# Patient Record
Sex: MALE | Race: WHITE | Employment: FULL TIME | URBAN - METROPOLITAN AREA
[De-identification: names, ages, dates, MRNs, and addresses within clinical notes are randomized per-mention and may not be internally consistent; named-entity substitution may affect disease eponyms.]

---

## 2017-03-10 ENCOUNTER — HOSPITAL ENCOUNTER (OUTPATIENT)
Age: 52
Setting detail: SPECIMEN
Discharge: HOME | End: 2017-03-10
Payer: COMMERCIAL

## 2017-03-10 LAB
ALBUMIN SERPL-MCNC: 4.5 G/DL (ref 3.5–5.2)
ALBUMIN/GLOBULIN RATIO: 2 (ref 1–2.5)
ALP BLD-CCNC: 33 U/L (ref 40–129)
ALT SERPL-CCNC: 15 U/L (ref 5–41)
ANION GAP SERPL CALCULATED.3IONS-SCNC: 17 MMOL/L (ref 9–17)
AST SERPL-CCNC: 18 U/L
BILIRUB SERPL-MCNC: 0.23 MG/DL (ref 0.3–1.2)
BILIRUBIN DIRECT: 0.09 MG/DL
BILIRUBIN, INDIRECT: 0.14 MG/DL (ref 0–1)
BUN BLDV-MCNC: 16 MG/DL (ref 6–20)
CALCIUM SERPL-MCNC: 9.7 MG/DL (ref 8.6–10.4)
CHLORIDE BLD-SCNC: 103 MMOL/L (ref 98–107)
CHOLESTEROL/HDL RATIO: 5.8
CHOLESTEROL: 210 MG/DL
CO2: 22 MMOL/L (ref 20–31)
CREAT SERPL-MCNC: 0.61 MG/DL (ref 0.7–1.2)
GFR AFRICAN AMERICAN: >60 ML/MIN
GFR NON-AFRICAN AMERICAN: >60 ML/MIN
GFR SERPL CREATININE-BSD FRML MDRD: ABNORMAL ML/MIN/{1.73_M2}
GFR SERPL CREATININE-BSD FRML MDRD: ABNORMAL ML/MIN/{1.73_M2}
GLOBULIN: ABNORMAL G/DL (ref 1.5–3.8)
GLUCOSE BLD-MCNC: 99 MG/DL (ref 70–99)
HCT VFR BLD CALC: 42 % (ref 41–53)
HDLC SERPL-MCNC: 36 MG/DL
HEMOGLOBIN: 14.1 G/DL (ref 13.5–17.5)
IRON: 73 UG/DL (ref 59–158)
LDL CHOLESTEROL: 140 MG/DL (ref 0–130)
MAGNESIUM: 1.9 MG/DL (ref 1.6–2.6)
MCH RBC QN AUTO: 29.2 PG (ref 26–34)
MCHC RBC AUTO-ENTMCNC: 33.6 G/DL (ref 31–37)
MCV RBC AUTO: 86.9 FL (ref 80–100)
PDW BLD-RTO: 13.2 % (ref 12.5–15.4)
PLATELET # BLD: 258 K/UL (ref 140–450)
PMV BLD AUTO: 7.8 FL (ref 6–12)
POTASSIUM SERPL-SCNC: 4.3 MMOL/L (ref 3.7–5.3)
RBC # BLD: 4.83 M/UL (ref 4.5–5.9)
SODIUM BLD-SCNC: 142 MMOL/L (ref 135–144)
TOTAL PROTEIN: 6.8 G/DL (ref 6.4–8.3)
TRIGL SERPL-MCNC: 169 MG/DL
URIC ACID: 4.7 MG/DL (ref 3.4–7)
VITAMIN B-12: 305 PG/ML (ref 211–946)
VLDLC SERPL CALC-MCNC: ABNORMAL MG/DL (ref 1–30)
WBC # BLD: 5.9 K/UL (ref 3.5–11)

## 2017-03-12 LAB
ESTIMATED AVERAGE GLUCOSE: 108 MG/DL
HBA1C MFR BLD: 5.4 % (ref 4–6)

## 2017-06-02 ENCOUNTER — HOSPITAL ENCOUNTER (OUTPATIENT)
Age: 52
Setting detail: SPECIMEN
Discharge: HOME | End: 2017-06-02
Payer: COMMERCIAL

## 2017-06-02 LAB
ALBUMIN SERPL-MCNC: 4.5 G/DL (ref 3.5–5.2)
ALBUMIN/GLOBULIN RATIO: 1.8 (ref 1–2.5)
ALP BLD-CCNC: 43 U/L (ref 40–129)
ALT SERPL-CCNC: 23 U/L (ref 5–41)
ANION GAP SERPL CALCULATED.3IONS-SCNC: 16 MMOL/L (ref 9–17)
AST SERPL-CCNC: 19 U/L
BILIRUB SERPL-MCNC: 0.55 MG/DL (ref 0.3–1.2)
BILIRUBIN DIRECT: 0.13 MG/DL
BILIRUBIN, INDIRECT: 0.42 MG/DL (ref 0–1)
BUN BLDV-MCNC: 13 MG/DL (ref 6–20)
CALCIUM SERPL-MCNC: 10 MG/DL (ref 8.6–10.4)
CHLORIDE BLD-SCNC: 101 MMOL/L (ref 98–107)
CHOLESTEROL/HDL RATIO: 5.2
CHOLESTEROL: 202 MG/DL
CO2: 24 MMOL/L (ref 20–31)
CREAT SERPL-MCNC: 0.5 MG/DL (ref 0.7–1.2)
ESTIMATED AVERAGE GLUCOSE: 103 MG/DL
FOLATE: >20 NG/ML
GFR AFRICAN AMERICAN: >60 ML/MIN
GFR NON-AFRICAN AMERICAN: >60 ML/MIN
GFR SERPL CREATININE-BSD FRML MDRD: ABNORMAL ML/MIN/{1.73_M2}
GFR SERPL CREATININE-BSD FRML MDRD: ABNORMAL ML/MIN/{1.73_M2}
GLOBULIN: NORMAL G/DL (ref 1.5–3.8)
GLUCOSE BLD-MCNC: 104 MG/DL (ref 70–99)
HBA1C MFR BLD: 5.2 % (ref 4–6)
HCT VFR BLD CALC: 46.3 % (ref 41–53)
HDLC SERPL-MCNC: 39 MG/DL
HEMOGLOBIN: 15.6 G/DL (ref 13.5–17.5)
IRON: 121 UG/DL (ref 59–158)
LDL CHOLESTEROL: 116 MG/DL (ref 0–130)
MCH RBC QN AUTO: 29.2 PG (ref 26–34)
MCHC RBC AUTO-ENTMCNC: 33.6 G/DL (ref 31–37)
MCV RBC AUTO: 86.9 FL (ref 80–100)
PDW BLD-RTO: 14 % (ref 12.5–15.4)
PLATELET # BLD: 233 K/UL (ref 140–450)
PMV BLD AUTO: 8.3 FL (ref 6–12)
POTASSIUM SERPL-SCNC: 4.8 MMOL/L (ref 3.7–5.3)
PROSTATE SPECIFIC ANTIGEN: 0.87 UG/L
RBC # BLD: 5.32 M/UL (ref 4.5–5.9)
SODIUM BLD-SCNC: 141 MMOL/L (ref 135–144)
TOTAL PROTEIN: 7 G/DL (ref 6.4–8.3)
TRIGL SERPL-MCNC: 236 MG/DL
URIC ACID: 5.2 MG/DL (ref 3.4–7)
VITAMIN B-12: 381 PG/ML (ref 211–946)
VLDLC SERPL CALC-MCNC: ABNORMAL MG/DL (ref 1–30)
WBC # BLD: 7 K/UL (ref 3.5–11)

## 2017-06-05 LAB — VITAMIN B1 WHOLE BLOOD: 171 NMOL/L (ref 70–180)

## 2017-06-07 ENCOUNTER — HOSPITAL ENCOUNTER (OUTPATIENT)
Age: 52
Setting detail: OUTPATIENT SURGERY
Discharge: HOME | End: 2017-06-07
Attending: UROLOGY | Admitting: UROLOGY
Payer: COMMERCIAL

## 2017-06-07 VITALS
BODY MASS INDEX: 35.7 KG/M2 | DIASTOLIC BLOOD PRESSURE: 86 MMHG | TEMPERATURE: 98.1 F | SYSTOLIC BLOOD PRESSURE: 148 MMHG | HEIGHT: 69 IN | WEIGHT: 241 LBS | RESPIRATION RATE: 14 BRPM | HEART RATE: 64 BPM | OXYGEN SATURATION: 98 %

## 2017-06-07 PROBLEM — L72.3 SCROTAL SEBACEOUS CYST: Status: ACTIVE | Noted: 2017-06-07

## 2017-06-07 PROCEDURE — 7100000011 HC PHASE II RECOVERY - ADDTL 15 MIN: Performed by: UROLOGY

## 2017-06-07 PROCEDURE — 2500000003 HC RX 250 WO HCPCS: Performed by: UROLOGY

## 2017-06-07 PROCEDURE — 3600000012 HC SURGERY LEVEL 2 ADDTL 15MIN: Performed by: UROLOGY

## 2017-06-07 PROCEDURE — 7100000010 HC PHASE II RECOVERY - FIRST 15 MIN: Performed by: UROLOGY

## 2017-06-07 PROCEDURE — 88305 TISSUE EXAM BY PATHOLOGIST: CPT

## 2017-06-07 PROCEDURE — 6370000000 HC RX 637 (ALT 250 FOR IP)

## 2017-06-07 PROCEDURE — 3600000002 HC SURGERY LEVEL 2 BASE: Performed by: UROLOGY

## 2017-06-07 RX ORDER — SODIUM CHLORIDE, SODIUM LACTATE, POTASSIUM CHLORIDE, CALCIUM CHLORIDE 600; 310; 30; 20 MG/100ML; MG/100ML; MG/100ML; MG/100ML
INJECTION, SOLUTION INTRAVENOUS CONTINUOUS
Status: DISCONTINUED | OUTPATIENT
Start: 2017-06-08 | End: 2017-06-07

## 2017-06-07 RX ORDER — SODIUM CHLORIDE 9 MG/ML
INJECTION, SOLUTION INTRAVENOUS CONTINUOUS
Status: DISCONTINUED | OUTPATIENT
Start: 2017-06-08 | End: 2017-06-07

## 2017-06-07 RX ORDER — DIAPER,BRIEF,INFANT-TODD,DISP
EACH MISCELLANEOUS PRN
Status: DISCONTINUED | OUTPATIENT
Start: 2017-06-07 | End: 2017-06-07 | Stop reason: HOSPADM

## 2017-06-07 RX ORDER — SODIUM CHLORIDE 0.9 % (FLUSH) 0.9 %
10 SYRINGE (ML) INJECTION PRN
Status: DISCONTINUED | OUTPATIENT
Start: 2017-06-07 | End: 2017-06-07

## 2017-06-07 RX ORDER — SODIUM CHLORIDE 0.9 % (FLUSH) 0.9 %
10 SYRINGE (ML) INJECTION EVERY 12 HOURS SCHEDULED
Status: DISCONTINUED | OUTPATIENT
Start: 2017-06-07 | End: 2017-06-07

## 2017-06-07 RX ORDER — BUPIVACAINE HYDROCHLORIDE 5 MG/ML
INJECTION, SOLUTION EPIDURAL; INTRACAUDAL PRN
Status: DISCONTINUED | OUTPATIENT
Start: 2017-06-07 | End: 2017-06-07 | Stop reason: HOSPADM

## 2017-06-07 RX ORDER — LIDOCAINE HYDROCHLORIDE 10 MG/ML
1 INJECTION, SOLUTION EPIDURAL; INFILTRATION; INTRACAUDAL; PERINEURAL
Status: DISCONTINUED | OUTPATIENT
Start: 2017-06-07 | End: 2017-06-07

## 2017-06-07 ASSESSMENT — PAIN - FUNCTIONAL ASSESSMENT: PAIN_FUNCTIONAL_ASSESSMENT: 0-10

## 2017-06-08 LAB — SURGICAL PATHOLOGY REPORT: NORMAL

## 2017-08-25 ENCOUNTER — HOSPITAL ENCOUNTER (OUTPATIENT)
Age: 52
Setting detail: SPECIMEN
Discharge: HOME | End: 2017-08-25
Payer: COMMERCIAL

## 2017-08-25 LAB
ALBUMIN SERPL-MCNC: 4.5 G/DL (ref 3.5–5.2)
ALBUMIN/GLOBULIN RATIO: 1.8 (ref 1–2.5)
ALP BLD-CCNC: 46 U/L (ref 40–129)
ALT SERPL-CCNC: 41 U/L (ref 5–41)
ANION GAP SERPL CALCULATED.3IONS-SCNC: 14 MMOL/L (ref 9–17)
AST SERPL-CCNC: 43 U/L
BILIRUB SERPL-MCNC: 0.49 MG/DL (ref 0.3–1.2)
BILIRUBIN DIRECT: 0.14 MG/DL
BILIRUBIN, INDIRECT: 0.35 MG/DL (ref 0–1)
BUN BLDV-MCNC: 12 MG/DL (ref 6–20)
CHLORIDE BLD-SCNC: 105 MMOL/L (ref 98–107)
CHOLESTEROL/HDL RATIO: 5
CHOLESTEROL: 214 MG/DL
CO2: 23 MMOL/L (ref 20–31)
CREAT SERPL-MCNC: 0.42 MG/DL (ref 0.7–1.2)
ESTIMATED AVERAGE GLUCOSE: 97 MG/DL
FOLATE: >20 NG/ML
GFR AFRICAN AMERICAN: >60 ML/MIN
GFR NON-AFRICAN AMERICAN: >60 ML/MIN
GFR SERPL CREATININE-BSD FRML MDRD: ABNORMAL ML/MIN/{1.73_M2}
GFR SERPL CREATININE-BSD FRML MDRD: ABNORMAL ML/MIN/{1.73_M2}
GLOBULIN: ABNORMAL G/DL (ref 1.5–3.8)
GLUCOSE BLD-MCNC: 106 MG/DL (ref 70–99)
HBA1C MFR BLD: 5 % (ref 4–6)
HCT VFR BLD CALC: 42 % (ref 41–53)
HDLC SERPL-MCNC: 43 MG/DL
HEMOGLOBIN: 14.4 G/DL (ref 13.5–17.5)
LDL CHOLESTEROL: 96 MG/DL (ref 0–130)
MCH RBC QN AUTO: 31.1 PG (ref 26–34)
MCHC RBC AUTO-ENTMCNC: 34.2 G/DL (ref 31–37)
MCV RBC AUTO: 90.9 FL (ref 80–100)
PDW BLD-RTO: 15.9 % (ref 12.5–15.4)
PLATELET # BLD: 223 K/UL (ref 140–450)
PMV BLD AUTO: 7.8 FL (ref 6–12)
POTASSIUM SERPL-SCNC: 4.3 MMOL/L (ref 3.7–5.3)
RBC # BLD: 4.62 M/UL (ref 4.5–5.9)
SODIUM BLD-SCNC: 142 MMOL/L (ref 135–144)
TOTAL PROTEIN: 7 G/DL (ref 6.4–8.3)
TRIGL SERPL-MCNC: 374 MG/DL
URIC ACID: 4.7 MG/DL (ref 3.4–7)
VLDLC SERPL CALC-MCNC: ABNORMAL MG/DL (ref 1–30)
WBC # BLD: 6.9 K/UL (ref 3.5–11)

## 2017-08-29 LAB — VITAMIN B1 WHOLE BLOOD: 163 NMOL/L (ref 70–180)

## 2017-11-17 ENCOUNTER — HOSPITAL ENCOUNTER (OUTPATIENT)
Age: 52
Discharge: HOME | End: 2017-11-17
Payer: COMMERCIAL

## 2017-11-17 ENCOUNTER — HOSPITAL ENCOUNTER (OUTPATIENT)
Dept: GENERAL RADIOLOGY | Age: 52
Discharge: HOME | End: 2017-11-17
Payer: COMMERCIAL

## 2017-11-17 DIAGNOSIS — M25.561 RIGHT KNEE PAIN, UNSPECIFIED CHRONICITY: ICD-10-CM

## 2017-11-17 PROCEDURE — 73562 X-RAY EXAM OF KNEE 3: CPT

## 2017-12-01 ENCOUNTER — HOSPITAL ENCOUNTER (OUTPATIENT)
Age: 52
Setting detail: SPECIMEN
Discharge: HOME | End: 2017-12-01
Payer: COMMERCIAL

## 2017-12-01 LAB
ALBUMIN SERPL-MCNC: 4 G/DL (ref 3.5–5.2)
ALBUMIN/GLOBULIN RATIO: 1.5 (ref 1–2.5)
ALP BLD-CCNC: 54 U/L (ref 40–129)
ALT SERPL-CCNC: 61 U/L (ref 5–41)
ANION GAP SERPL CALCULATED.3IONS-SCNC: 15 MMOL/L (ref 9–17)
AST SERPL-CCNC: 83 U/L
BILIRUB SERPL-MCNC: 0.53 MG/DL (ref 0.3–1.2)
BILIRUBIN DIRECT: 0.18 MG/DL
BILIRUBIN, INDIRECT: 0.35 MG/DL (ref 0–1)
BUN BLDV-MCNC: 11 MG/DL (ref 6–20)
CHLORIDE BLD-SCNC: 102 MMOL/L (ref 98–107)
CHOLESTEROL/HDL RATIO: 6.8
CHOLESTEROL: 252 MG/DL
CO2: 22 MMOL/L (ref 20–31)
CREAT SERPL-MCNC: 0.38 MG/DL (ref 0.7–1.2)
ESTIMATED AVERAGE GLUCOSE: 100 MG/DL
GFR AFRICAN AMERICAN: >60 ML/MIN
GFR NON-AFRICAN AMERICAN: >60 ML/MIN
GFR SERPL CREATININE-BSD FRML MDRD: ABNORMAL ML/MIN/{1.73_M2}
GFR SERPL CREATININE-BSD FRML MDRD: ABNORMAL ML/MIN/{1.73_M2}
GLOBULIN: ABNORMAL G/DL (ref 1.5–3.8)
GLUCOSE BLD-MCNC: 112 MG/DL (ref 70–99)
HBA1C MFR BLD: 5.1 % (ref 4–6)
HCT VFR BLD CALC: 41.6 % (ref 40.7–50.3)
HDLC SERPL-MCNC: 37 MG/DL
HEMOGLOBIN: 14.1 G/DL (ref 13–17)
LDL CHOLESTEROL DIRECT: 109 MG/DL
LDL CHOLESTEROL: ABNORMAL MG/DL (ref 0–130)
MCH RBC QN AUTO: 31.4 PG (ref 25.2–33.5)
MCHC RBC AUTO-ENTMCNC: 33.9 G/DL (ref 28.4–34.8)
MCV RBC AUTO: 92.7 FL (ref 82.6–102.9)
PDW BLD-RTO: 12.8 % (ref 11.8–14.4)
PLATELET # BLD: 191 K/UL (ref 138–453)
PMV BLD AUTO: 10.1 FL (ref 8.1–13.5)
POTASSIUM SERPL-SCNC: 4 MMOL/L (ref 3.7–5.3)
RBC # BLD: 4.49 M/UL (ref 4.21–5.77)
SODIUM BLD-SCNC: 139 MMOL/L (ref 135–144)
TOTAL PROTEIN: 6.6 G/DL (ref 6.4–8.3)
TRIGL SERPL-MCNC: 759 MG/DL
URIC ACID: 5.2 MG/DL (ref 3.4–7)
VLDLC SERPL CALC-MCNC: ABNORMAL MG/DL (ref 1–30)
WBC # BLD: 5.6 K/UL (ref 3.5–11.3)

## 2017-12-05 LAB — VITAMIN B1 WHOLE BLOOD: 162 NMOL/L (ref 70–180)

## 2018-02-09 ENCOUNTER — HOSPITAL ENCOUNTER (OUTPATIENT)
Age: 53
Discharge: HOME | End: 2018-02-09
Payer: COMMERCIAL

## 2018-02-09 LAB
ALBUMIN SERPL-MCNC: 4.7 G/DL (ref 3.5–5.2)
ALBUMIN/GLOBULIN RATIO: 2.6 (ref 1–2.5)
ALP BLD-CCNC: 30 U/L (ref 40–129)
ALT SERPL-CCNC: 23 U/L (ref 5–41)
ANION GAP SERPL CALCULATED.3IONS-SCNC: 13 MMOL/L (ref 9–17)
AST SERPL-CCNC: 23 U/L
BILIRUB SERPL-MCNC: 0.41 MG/DL (ref 0.3–1.2)
BILIRUBIN DIRECT: 0.13 MG/DL
BILIRUBIN, INDIRECT: 0.28 MG/DL (ref 0–1)
BUN BLDV-MCNC: 17 MG/DL (ref 6–20)
CHLORIDE BLD-SCNC: 102 MMOL/L (ref 98–107)
CHOLESTEROL, FASTING: 209 MG/DL
CHOLESTEROL/HDL RATIO: 5.8
CO2: 26 MMOL/L (ref 20–31)
CREAT SERPL-MCNC: 0.59 MG/DL (ref 0.7–1.2)
ESTIMATED AVERAGE GLUCOSE: 94 MG/DL
FOLATE: >20 NG/ML
GFR AFRICAN AMERICAN: >60 ML/MIN
GFR NON-AFRICAN AMERICAN: >60 ML/MIN
GFR SERPL CREATININE-BSD FRML MDRD: ABNORMAL ML/MIN/{1.73_M2}
GFR SERPL CREATININE-BSD FRML MDRD: ABNORMAL ML/MIN/{1.73_M2}
GLOBULIN: ABNORMAL G/DL (ref 1.5–3.8)
GLUCOSE BLD-MCNC: 110 MG/DL (ref 70–99)
HBA1C MFR BLD: 4.9 % (ref 4–6)
HCT VFR BLD CALC: 44.9 % (ref 40.7–50.3)
HDLC SERPL-MCNC: 36 MG/DL
HEMOGLOBIN: 15.1 G/DL (ref 13–17)
LDL CHOLESTEROL: 125 MG/DL (ref 0–130)
MCH RBC QN AUTO: 31.1 PG (ref 25.2–33.5)
MCHC RBC AUTO-ENTMCNC: 33.6 G/DL (ref 28.4–34.8)
MCV RBC AUTO: 92.4 FL (ref 82.6–102.9)
NRBC AUTOMATED: 0 PER 100 WBC
PDW BLD-RTO: 11.6 % (ref 11.8–14.4)
PLATELET # BLD: 245 K/UL (ref 138–453)
PMV BLD AUTO: 9.7 FL (ref 8.1–13.5)
POTASSIUM SERPL-SCNC: 4.5 MMOL/L (ref 3.7–5.3)
RBC # BLD: 4.86 M/UL (ref 4.21–5.77)
SODIUM BLD-SCNC: 141 MMOL/L (ref 135–144)
TOTAL PROTEIN: 6.5 G/DL (ref 6.4–8.3)
TRIGLYCERIDE, FASTING: 238 MG/DL
URIC ACID: 5 MG/DL (ref 3.4–7)
VLDLC SERPL CALC-MCNC: ABNORMAL MG/DL (ref 1–30)
WBC # BLD: 6.8 K/UL (ref 3.5–11.3)

## 2018-02-09 PROCEDURE — 80061 LIPID PANEL: CPT

## 2018-02-09 PROCEDURE — 83036 HEMOGLOBIN GLYCOSYLATED A1C: CPT

## 2018-02-09 PROCEDURE — 82746 ASSAY OF FOLIC ACID SERUM: CPT

## 2018-02-09 PROCEDURE — 82947 ASSAY GLUCOSE BLOOD QUANT: CPT

## 2018-02-09 PROCEDURE — 82565 ASSAY OF CREATININE: CPT

## 2018-02-09 PROCEDURE — 36415 COLL VENOUS BLD VENIPUNCTURE: CPT

## 2018-02-09 PROCEDURE — 84425 ASSAY OF VITAMIN B-1: CPT

## 2018-02-09 PROCEDURE — 80076 HEPATIC FUNCTION PANEL: CPT

## 2018-02-09 PROCEDURE — 84520 ASSAY OF UREA NITROGEN: CPT

## 2018-02-09 PROCEDURE — 80051 ELECTROLYTE PANEL: CPT

## 2018-02-09 PROCEDURE — 84550 ASSAY OF BLOOD/URIC ACID: CPT

## 2018-02-09 PROCEDURE — 85027 COMPLETE CBC AUTOMATED: CPT

## 2018-02-14 LAB — VITAMIN B1 WHOLE BLOOD: 159 NMOL/L (ref 70–180)

## 2018-04-23 ENCOUNTER — HOSPITAL ENCOUNTER (OUTPATIENT)
Dept: CT IMAGING | Age: 53
Discharge: HOME | End: 2018-04-25
Payer: COMMERCIAL

## 2018-04-23 DIAGNOSIS — M79.672 LEFT FOOT PAIN: ICD-10-CM

## 2018-04-23 PROCEDURE — 73700 CT LOWER EXTREMITY W/O DYE: CPT

## 2018-05-17 ENCOUNTER — HOSPITAL ENCOUNTER (OUTPATIENT)
Age: 53
Setting detail: SPECIMEN
Discharge: HOME | End: 2018-05-17
Payer: COMMERCIAL

## 2018-05-17 LAB
ALBUMIN SERPL-MCNC: 4.2 G/DL (ref 3.5–5.2)
ALBUMIN/GLOBULIN RATIO: 1.7 (ref 1–2.5)
ALP BLD-CCNC: 30 U/L (ref 40–129)
ALT SERPL-CCNC: 21 U/L (ref 5–41)
ANION GAP SERPL CALCULATED.3IONS-SCNC: 13 MMOL/L (ref 9–17)
AST SERPL-CCNC: 20 U/L
BILIRUB SERPL-MCNC: 0.32 MG/DL (ref 0.3–1.2)
BILIRUBIN DIRECT: 0.11 MG/DL
BILIRUBIN, INDIRECT: 0.21 MG/DL (ref 0–1)
BUN BLDV-MCNC: 17 MG/DL (ref 6–20)
CHLORIDE BLD-SCNC: 102 MMOL/L (ref 98–107)
CHOLESTEROL/HDL RATIO: 5
CHOLESTEROL: 176 MG/DL
CO2: 25 MMOL/L (ref 20–31)
CREAT SERPL-MCNC: 0.63 MG/DL (ref 0.7–1.2)
ESTIMATED AVERAGE GLUCOSE: 105 MG/DL
FOLATE: >20 NG/ML
GFR AFRICAN AMERICAN: >60 ML/MIN
GFR NON-AFRICAN AMERICAN: >60 ML/MIN
GFR SERPL CREATININE-BSD FRML MDRD: ABNORMAL ML/MIN/{1.73_M2}
GFR SERPL CREATININE-BSD FRML MDRD: ABNORMAL ML/MIN/{1.73_M2}
GLOBULIN: ABNORMAL G/DL (ref 1.5–3.8)
GLUCOSE BLD-MCNC: 103 MG/DL (ref 70–99)
HBA1C MFR BLD: 5.3 % (ref 4–6)
HCT VFR BLD CALC: 44.2 % (ref 40.7–50.3)
HDLC SERPL-MCNC: 35 MG/DL
HEMOGLOBIN: 14.7 G/DL (ref 13–17)
IRON: 70 UG/DL (ref 59–158)
LDL CHOLESTEROL: 73 MG/DL (ref 0–130)
MCH RBC QN AUTO: 29.3 PG (ref 25.2–33.5)
MCHC RBC AUTO-ENTMCNC: 33.3 G/DL (ref 28.4–34.8)
MCV RBC AUTO: 88.2 FL (ref 82.6–102.9)
NRBC AUTOMATED: 0 PER 100 WBC
PDW BLD-RTO: 12.6 % (ref 11.8–14.4)
PLATELET # BLD: 271 K/UL (ref 138–453)
PMV BLD AUTO: 9.3 FL (ref 8.1–13.5)
POTASSIUM SERPL-SCNC: 4.1 MMOL/L (ref 3.7–5.3)
RBC # BLD: 5.01 M/UL (ref 4.21–5.77)
SODIUM BLD-SCNC: 140 MMOL/L (ref 135–144)
TOTAL PROTEIN: 6.7 G/DL (ref 6.4–8.3)
TRIGL SERPL-MCNC: 341 MG/DL
URIC ACID: 5.7 MG/DL (ref 3.4–7)
VITAMIN B-12: 361 PG/ML (ref 232–1245)
VLDLC SERPL CALC-MCNC: ABNORMAL MG/DL (ref 1–30)
WBC # BLD: 7.2 K/UL (ref 3.5–11.3)

## 2018-05-21 LAB — VITAMIN B1 WHOLE BLOOD: 134 NMOL/L (ref 70–180)

## 2018-08-24 ENCOUNTER — HOSPITAL ENCOUNTER (OUTPATIENT)
Age: 53
Setting detail: SPECIMEN
Discharge: HOME | End: 2018-08-24
Payer: COMMERCIAL

## 2018-08-24 LAB
ALBUMIN SERPL-MCNC: 4.5 G/DL (ref 3.5–5.2)
ALBUMIN/GLOBULIN RATIO: 1.7 (ref 1–2.5)
ALP BLD-CCNC: 36 U/L (ref 40–129)
ALT SERPL-CCNC: 25 U/L (ref 5–41)
ANION GAP SERPL CALCULATED.3IONS-SCNC: 16 MMOL/L (ref 9–17)
AST SERPL-CCNC: 33 U/L
BILIRUB SERPL-MCNC: 0.41 MG/DL (ref 0.3–1.2)
BILIRUBIN DIRECT: 0.14 MG/DL
BILIRUBIN, INDIRECT: 0.27 MG/DL (ref 0–1)
BUN BLDV-MCNC: 15 MG/DL (ref 6–20)
CHLORIDE BLD-SCNC: 99 MMOL/L (ref 98–107)
CHOLESTEROL/HDL RATIO: 4.7
CHOLESTEROL: 215 MG/DL
CO2: 22 MMOL/L (ref 20–31)
CREAT SERPL-MCNC: 0.51 MG/DL (ref 0.7–1.2)
ESTIMATED AVERAGE GLUCOSE: 105 MG/DL
GFR AFRICAN AMERICAN: >60 ML/MIN
GFR NON-AFRICAN AMERICAN: >60 ML/MIN
GFR SERPL CREATININE-BSD FRML MDRD: ABNORMAL ML/MIN/{1.73_M2}
GFR SERPL CREATININE-BSD FRML MDRD: ABNORMAL ML/MIN/{1.73_M2}
GLOBULIN: ABNORMAL G/DL (ref 1.5–3.8)
GLUCOSE BLD-MCNC: 110 MG/DL (ref 70–99)
HBA1C MFR BLD: 5.3 % (ref 4–6)
HCT VFR BLD CALC: 44.5 % (ref 40.7–50.3)
HDLC SERPL-MCNC: 46 MG/DL
HEMOGLOBIN: 15.1 G/DL (ref 13–17)
IRON: 72 UG/DL (ref 59–158)
LDL CHOLESTEROL: 107 MG/DL (ref 0–130)
MCH RBC QN AUTO: 31.4 PG (ref 25.2–33.5)
MCHC RBC AUTO-ENTMCNC: 33.9 G/DL (ref 28.4–34.8)
MCV RBC AUTO: 92.5 FL (ref 82.6–102.9)
NRBC AUTOMATED: 0 PER 100 WBC
PDW BLD-RTO: 13.6 % (ref 11.8–14.4)
PLATELET # BLD: 235 K/UL (ref 138–453)
PMV BLD AUTO: 9.6 FL (ref 8.1–13.5)
POTASSIUM SERPL-SCNC: 4.2 MMOL/L (ref 3.7–5.3)
PROSTATE SPECIFIC ANTIGEN: 0.54 UG/L
PROSTATE SPECIFIC ANTIGEN: 0.54 UG/L
RBC # BLD: 4.81 M/UL (ref 4.21–5.77)
SODIUM BLD-SCNC: 137 MMOL/L (ref 135–144)
TOTAL PROTEIN: 7.1 G/DL (ref 6.4–8.3)
TRIGL SERPL-MCNC: 311 MG/DL
URIC ACID: 5 MG/DL (ref 3.4–7)
VITAMIN B-12: 412 PG/ML (ref 232–1245)
VLDLC SERPL CALC-MCNC: ABNORMAL MG/DL (ref 1–30)
WBC # BLD: 7.4 K/UL (ref 3.5–11.3)

## 2018-08-29 ENCOUNTER — HOSPITAL ENCOUNTER (OUTPATIENT)
Dept: GENERAL RADIOLOGY | Age: 53
Discharge: HOME | End: 2018-08-31
Payer: COMMERCIAL

## 2018-08-29 ENCOUNTER — HOSPITAL ENCOUNTER (OUTPATIENT)
Age: 53
Discharge: HOME | End: 2018-08-31
Payer: COMMERCIAL

## 2018-08-29 DIAGNOSIS — M25.561 ACUTE PAIN OF RIGHT KNEE: ICD-10-CM

## 2018-08-29 DIAGNOSIS — M79.671 RIGHT FOOT PAIN: ICD-10-CM

## 2018-08-29 LAB — VITAMIN B1 WHOLE BLOOD: 187 NMOL/L (ref 70–180)

## 2018-08-29 PROCEDURE — 73562 X-RAY EXAM OF KNEE 3: CPT

## 2018-08-29 PROCEDURE — 73630 X-RAY EXAM OF FOOT: CPT

## 2018-11-02 ENCOUNTER — HOSPITAL ENCOUNTER (OUTPATIENT)
Age: 53
Setting detail: SPECIMEN
Discharge: HOME | End: 2018-11-02
Payer: COMMERCIAL

## 2018-11-02 LAB
ALBUMIN SERPL-MCNC: 4.2 G/DL (ref 3.5–5.2)
ALBUMIN/GLOBULIN RATIO: 1.8 (ref 1–2.5)
ALP BLD-CCNC: 32 U/L (ref 40–129)
ALT SERPL-CCNC: 23 U/L (ref 5–41)
ANION GAP SERPL CALCULATED.3IONS-SCNC: 18 MMOL/L (ref 9–17)
AST SERPL-CCNC: 26 U/L
BILIRUB SERPL-MCNC: 0.43 MG/DL (ref 0.3–1.2)
BILIRUBIN DIRECT: 0.15 MG/DL
BILIRUBIN, INDIRECT: 0.28 MG/DL (ref 0–1)
BUN BLDV-MCNC: 13 MG/DL (ref 6–20)
CHLORIDE BLD-SCNC: 106 MMOL/L (ref 98–107)
CHOLESTEROL/HDL RATIO: 4.3
CHOLESTEROL: 194 MG/DL
CO2: 22 MMOL/L (ref 20–31)
CREAT SERPL-MCNC: 0.51 MG/DL (ref 0.7–1.2)
ESTIMATED AVERAGE GLUCOSE: 94 MG/DL
GFR AFRICAN AMERICAN: >60 ML/MIN
GFR NON-AFRICAN AMERICAN: >60 ML/MIN
GFR SERPL CREATININE-BSD FRML MDRD: ABNORMAL ML/MIN/{1.73_M2}
GFR SERPL CREATININE-BSD FRML MDRD: ABNORMAL ML/MIN/{1.73_M2}
GLOBULIN: ABNORMAL G/DL (ref 1.5–3.8)
GLUCOSE BLD-MCNC: 102 MG/DL (ref 70–99)
HBA1C MFR BLD: 4.9 % (ref 4–6)
HCT VFR BLD CALC: 40.1 % (ref 40.7–50.3)
HDLC SERPL-MCNC: 45 MG/DL
HEMOGLOBIN: 13.6 G/DL (ref 13–17)
IRON: 98 UG/DL (ref 59–158)
LDL CHOLESTEROL: 99 MG/DL (ref 0–130)
MCH RBC QN AUTO: 32.8 PG (ref 25.2–33.5)
MCHC RBC AUTO-ENTMCNC: 33.9 G/DL (ref 28.4–34.8)
MCV RBC AUTO: 96.6 FL (ref 82.6–102.9)
NRBC AUTOMATED: 0 PER 100 WBC
PDW BLD-RTO: 13 % (ref 11.8–14.4)
PLATELET # BLD: 229 K/UL (ref 138–453)
PMV BLD AUTO: 9.7 FL (ref 8.1–13.5)
POTASSIUM SERPL-SCNC: 4 MMOL/L (ref 3.7–5.3)
RBC # BLD: 4.15 M/UL (ref 4.21–5.77)
SODIUM BLD-SCNC: 146 MMOL/L (ref 135–144)
TOTAL PROTEIN: 6.6 G/DL (ref 6.4–8.3)
TRIGL SERPL-MCNC: 252 MG/DL
URIC ACID: 5.8 MG/DL (ref 3.4–7)
VITAMIN B-12: 270 PG/ML (ref 232–1245)
VLDLC SERPL CALC-MCNC: ABNORMAL MG/DL (ref 1–30)
WBC # BLD: 6.7 K/UL (ref 3.5–11.3)

## 2018-11-07 LAB — VITAMIN B1 WHOLE BLOOD: 136 NMOL/L (ref 70–180)

## 2018-11-13 ENCOUNTER — HOSPITAL ENCOUNTER (EMERGENCY)
Age: 53
Discharge: HOME | End: 2018-11-13
Attending: EMERGENCY MEDICINE
Payer: COMMERCIAL

## 2018-11-13 VITALS
OXYGEN SATURATION: 98 % | SYSTOLIC BLOOD PRESSURE: 166 MMHG | HEART RATE: 88 BPM | TEMPERATURE: 98.5 F | RESPIRATION RATE: 16 BRPM | DIASTOLIC BLOOD PRESSURE: 7 MMHG

## 2018-11-13 DIAGNOSIS — R26.81 UNSTEADY GAIT: Primary | ICD-10-CM

## 2018-11-13 LAB
ABSOLUTE EOS #: 0.05 K/UL (ref 0–0.44)
ABSOLUTE IMMATURE GRANULOCYTE: 0.05 K/UL (ref 0–0.3)
ABSOLUTE LYMPH #: 1.88 K/UL (ref 1.1–3.7)
ABSOLUTE MONO #: 0.74 K/UL (ref 0.1–1.2)
ACETAMINOPHEN LEVEL: <5 UG/ML (ref 10–30)
ALBUMIN SERPL-MCNC: 3.5 G/DL (ref 3.5–5.2)
ALBUMIN/GLOBULIN RATIO: 1.6 (ref 1–2.5)
ALP BLD-CCNC: 41 U/L (ref 40–129)
ALT SERPL-CCNC: 316 U/L (ref 5–41)
ANION GAP SERPL CALCULATED.3IONS-SCNC: 20 MMOL/L (ref 9–17)
AST SERPL-CCNC: 837 U/L
BASOPHILS # BLD: 1 % (ref 0–2)
BASOPHILS ABSOLUTE: 0.04 K/UL (ref 0–0.2)
BILIRUB SERPL-MCNC: 0.98 MG/DL (ref 0.3–1.2)
BUN BLDV-MCNC: 23 MG/DL (ref 6–20)
BUN/CREAT BLD: ABNORMAL (ref 9–20)
CALCIUM SERPL-MCNC: 8.1 MG/DL (ref 8.6–10.4)
CHLORIDE BLD-SCNC: 105 MMOL/L (ref 98–107)
CO2: 17 MMOL/L (ref 20–31)
CREAT SERPL-MCNC: 0.48 MG/DL (ref 0.7–1.2)
DIFFERENTIAL TYPE: ABNORMAL
EOSINOPHILS RELATIVE PERCENT: 1 % (ref 1–4)
ETHANOL PERCENT: 0.14 %
ETHANOL: 143 MG/DL
GFR AFRICAN AMERICAN: >60 ML/MIN
GFR NON-AFRICAN AMERICAN: >60 ML/MIN
GFR SERPL CREATININE-BSD FRML MDRD: ABNORMAL ML/MIN/{1.73_M2}
GFR SERPL CREATININE-BSD FRML MDRD: ABNORMAL ML/MIN/{1.73_M2}
GLUCOSE BLD-MCNC: 116 MG/DL (ref 70–99)
HCT VFR BLD CALC: 42.5 % (ref 40.7–50.3)
HEMOGLOBIN: 14.7 G/DL (ref 13–17)
IMMATURE GRANULOCYTES: 1 %
LYMPHOCYTES # BLD: 22 % (ref 24–43)
MCH RBC QN AUTO: 32.2 PG (ref 25.2–33.5)
MCHC RBC AUTO-ENTMCNC: 34.6 G/DL (ref 28.4–34.8)
MCV RBC AUTO: 93.2 FL (ref 82.6–102.9)
MONOCYTES # BLD: 9 % (ref 3–12)
NRBC AUTOMATED: 0 PER 100 WBC
PDW BLD-RTO: 12.9 % (ref 11.8–14.4)
PLATELET # BLD: 224 K/UL (ref 138–453)
PLATELET ESTIMATE: ABNORMAL
PMV BLD AUTO: 9.1 FL (ref 8.1–13.5)
POTASSIUM SERPL-SCNC: 3.9 MMOL/L (ref 3.7–5.3)
RBC # BLD: 4.56 M/UL (ref 4.21–5.77)
RBC # BLD: ABNORMAL 10*6/UL
SALICYLATE LEVEL: <1 MG/DL (ref 3–10)
SEG NEUTROPHILS: 66 % (ref 36–65)
SEGMENTED NEUTROPHILS ABSOLUTE COUNT: 5.99 K/UL (ref 1.5–8.1)
SODIUM BLD-SCNC: 142 MMOL/L (ref 135–144)
TOTAL PROTEIN: 5.7 G/DL (ref 6.4–8.3)
TOXIC TRICYCLIC SC,BLOOD: NEGATIVE
TSH SERPL DL<=0.05 MIU/L-ACNC: 0.79 MIU/L (ref 0.3–5)
WBC # BLD: 8.8 K/UL (ref 3.5–11.3)
WBC # BLD: ABNORMAL 10*3/UL

## 2018-11-13 PROCEDURE — 84443 ASSAY THYROID STIM HORMONE: CPT

## 2018-11-13 PROCEDURE — 85025 COMPLETE CBC W/AUTO DIFF WBC: CPT

## 2018-11-13 PROCEDURE — 80053 COMPREHEN METABOLIC PANEL: CPT

## 2018-11-13 PROCEDURE — 80307 DRUG TEST PRSMV CHEM ANLYZR: CPT

## 2018-11-13 PROCEDURE — G0480 DRUG TEST DEF 1-7 CLASSES: HCPCS

## 2018-11-13 PROCEDURE — G0383 LEV 4 HOSP TYPE B ED VISIT: HCPCS

## 2018-11-13 ASSESSMENT — ENCOUNTER SYMPTOMS
ALLERGIC/IMMUNOLOGIC NEGATIVE: 1
GASTROINTESTINAL NEGATIVE: 1
RESPIRATORY NEGATIVE: 1
EYES NEGATIVE: 1

## 2018-11-13 NOTE — ED PROVIDER NOTES
[facility]     Emergency Department     Faculty Note/ Attestation      Pt Name: Andre Harris                                       MRN: [id number]  Eveliogffortunato 1965  Date of evaluation: 11/13/2018    Patients PCP:    Rufina Perry PA-C      Attestation  I performed a history and physical examination of the patient and discussed management with the resident. I reviewed the residents note and agree with the documented findings and plan of care. Any areas of disagreement are noted on the chart. I was personally present for the key portions of any procedures. I have documented in the chart those procedures where I was not present during the key portions. I have reviewed the emergency nurses triage note. I agree with the chief complaint, past medical history, past surgical history, allergies, medications, social and family history as documented unless otherwise noted below. For Physician Assistant/ Nurse Practitioner cases/documentation I have personally evaluated this patient and have completed at least one if not all key elements of the E/M (history, physical exam, and MDM). Additional findings are as noted. Initial Screens:             Vitals:    Vitals:    11/13/18 0743   BP: (!) 166/7   Pulse: 88   Resp: 16   Temp: 98.5 °F (36.9 °C)   SpO2: 98%       CHIEF COMPLAINT       Chief Complaint   Patient presents with    Dizziness     for 2 days, feels \"wobbley\"              DIAGNOSTIC RESULTS             RADIOLOGY:   No orders to display         LABS:  Labs Reviewed - No data to display      EMERGENCY DEPARTMENT COURSE:     -------------------------  BP: (!) 166/7, Temp: 98.5 °F (36.9 °C), Pulse: 88, Resp: 16      Comments            Jenkins MD, F.A.C.E.P.   Attending Emergency Physician         Elva Rojas MD  11/13/18 0840

## 2018-12-01 ENCOUNTER — HOSPITAL ENCOUNTER (EMERGENCY)
Age: 53
Discharge: HOME | End: 2018-12-01
Attending: EMERGENCY MEDICINE
Payer: COMMERCIAL

## 2018-12-01 VITALS
BODY MASS INDEX: 37.03 KG/M2 | HEART RATE: 85 BPM | DIASTOLIC BLOOD PRESSURE: 113 MMHG | RESPIRATION RATE: 16 BRPM | WEIGHT: 250 LBS | TEMPERATURE: 98.1 F | OXYGEN SATURATION: 100 % | HEIGHT: 69 IN | SYSTOLIC BLOOD PRESSURE: 189 MMHG

## 2018-12-01 DIAGNOSIS — R74.8 ELEVATED LIVER ENZYMES: ICD-10-CM

## 2018-12-01 DIAGNOSIS — F10.20 ALCOHOLISM (HCC): ICD-10-CM

## 2018-12-01 DIAGNOSIS — R53.83 FATIGUE, UNSPECIFIED TYPE: Primary | ICD-10-CM

## 2018-12-01 LAB
-: ABNORMAL
ABSOLUTE EOS #: 0 K/UL (ref 0–0.4)
ABSOLUTE IMMATURE GRANULOCYTE: ABNORMAL K/UL (ref 0–0.3)
ABSOLUTE LYMPH #: 1.1 K/UL (ref 1–4.8)
ABSOLUTE MONO #: 0.4 K/UL (ref 0.1–1.2)
ACETAMINOPHEN LEVEL: <5 UG/ML (ref 10–30)
ALBUMIN SERPL-MCNC: 3.9 G/DL (ref 3.5–5.2)
ALBUMIN/GLOBULIN RATIO: 1.6 (ref 1–2.5)
ALP BLD-CCNC: 56 U/L (ref 40–129)
ALT SERPL-CCNC: 462 U/L (ref 5–41)
AMORPHOUS: ABNORMAL
AMPHETAMINE SCREEN URINE: NEGATIVE
AMYLASE: 22 U/L (ref 28–100)
ANION GAP SERPL CALCULATED.3IONS-SCNC: 17 MMOL/L (ref 9–17)
AST SERPL-CCNC: 1090 U/L
BACTERIA: ABNORMAL
BARBITURATE SCREEN URINE: NEGATIVE
BASOPHILS # BLD: 1 % (ref 0–2)
BASOPHILS ABSOLUTE: 0 K/UL (ref 0–0.2)
BENZODIAZEPINE SCREEN, URINE: NEGATIVE
BILIRUB SERPL-MCNC: 1.87 MG/DL (ref 0.3–1.2)
BILIRUBIN DIRECT: 1.15 MG/DL
BILIRUBIN URINE: ABNORMAL
BILIRUBIN, INDIRECT: 0.72 MG/DL (ref 0–1)
BUN BLDV-MCNC: 15 MG/DL (ref 6–20)
BUN/CREAT BLD: ABNORMAL (ref 9–20)
BUPRENORPHINE URINE: NORMAL
CALCIUM SERPL-MCNC: 8.6 MG/DL (ref 8.6–10.4)
CANNABINOID SCREEN URINE: NEGATIVE
CASTS UA: ABNORMAL /LPF
CHLORIDE BLD-SCNC: 102 MMOL/L (ref 98–107)
CO2: 17 MMOL/L (ref 20–31)
COCAINE METABOLITE, URINE: NEGATIVE
COLOR: ABNORMAL
COMMENT UA: ABNORMAL
CREAT SERPL-MCNC: 0.67 MG/DL (ref 0.7–1.2)
CRYSTALS, UA: ABNORMAL /HPF
DIFFERENTIAL TYPE: ABNORMAL
EOSINOPHILS RELATIVE PERCENT: 1 % (ref 1–4)
EPITHELIAL CELLS UA: ABNORMAL /HPF (ref 0–5)
ETHANOL PERCENT: <0.01 %
ETHANOL: <10 MG/DL
GFR AFRICAN AMERICAN: >60 ML/MIN
GFR NON-AFRICAN AMERICAN: >60 ML/MIN
GFR SERPL CREATININE-BSD FRML MDRD: ABNORMAL ML/MIN/{1.73_M2}
GFR SERPL CREATININE-BSD FRML MDRD: ABNORMAL ML/MIN/{1.73_M2}
GLOBULIN: ABNORMAL G/DL (ref 1.5–3.8)
GLUCOSE BLD-MCNC: 172 MG/DL (ref 70–99)
GLUCOSE URINE: NEGATIVE
HCT VFR BLD CALC: 45.3 % (ref 41–53)
HEMOGLOBIN: 15.4 G/DL (ref 13.5–17.5)
IMMATURE GRANULOCYTES: ABNORMAL %
KETONES, URINE: NEGATIVE
LEUKOCYTE ESTERASE, URINE: NEGATIVE
LIPASE: 37 U/L (ref 13–60)
LYMPHOCYTES # BLD: 16 % (ref 24–44)
MCH RBC QN AUTO: 32.7 PG (ref 26–34)
MCHC RBC AUTO-ENTMCNC: 34.1 G/DL (ref 31–37)
MCV RBC AUTO: 95.8 FL (ref 80–100)
MDMA URINE: NORMAL
METHADONE SCREEN, URINE: NEGATIVE
METHAMPHETAMINE, URINE: NORMAL
MONOCYTES # BLD: 6 % (ref 2–11)
MUCUS: ABNORMAL
NITRITE, URINE: NEGATIVE
NRBC AUTOMATED: ABNORMAL PER 100 WBC
OPIATES, URINE: NEGATIVE
OTHER OBSERVATIONS UA: ABNORMAL
OXYCODONE SCREEN URINE: NEGATIVE
PDW BLD-RTO: 13.1 % (ref 12.5–15.4)
PH UA: 5.5 (ref 5–8)
PHENCYCLIDINE, URINE: NEGATIVE
PLATELET # BLD: 237 K/UL (ref 140–450)
PLATELET ESTIMATE: ABNORMAL
PMV BLD AUTO: 7.2 FL (ref 6–12)
POTASSIUM SERPL-SCNC: 3.9 MMOL/L (ref 3.7–5.3)
PROPOXYPHENE, URINE: NORMAL
PROTEIN UA: NEGATIVE
RBC # BLD: 4.72 M/UL (ref 4.5–5.9)
RBC # BLD: ABNORMAL 10*6/UL
RBC UA: ABNORMAL /HPF (ref 0–2)
RENAL EPITHELIAL, UA: ABNORMAL /HPF
SALICYLATE LEVEL: <1 MG/DL (ref 3–10)
SEG NEUTROPHILS: 76 % (ref 36–66)
SEGMENTED NEUTROPHILS ABSOLUTE COUNT: 5.6 K/UL (ref 1.8–7.7)
SODIUM BLD-SCNC: 136 MMOL/L (ref 135–144)
SPECIFIC GRAVITY UA: 1.03 (ref 1–1.03)
TEST INFORMATION: NORMAL
TOTAL PROTEIN: 6.3 G/DL (ref 6.4–8.3)
TOXIC TRICYCLIC SC,BLOOD: NEGATIVE
TRICHOMONAS: ABNORMAL
TRICYCLIC ANTIDEPRESSANTS, UR: NORMAL
TURBIDITY: CLEAR
URINE HGB: NEGATIVE
UROBILINOGEN, URINE: NORMAL
WBC # BLD: 7.2 K/UL (ref 3.5–11)
WBC # BLD: ABNORMAL 10*3/UL
WBC UA: ABNORMAL /HPF (ref 0–5)
YEAST: ABNORMAL

## 2018-12-01 PROCEDURE — 80076 HEPATIC FUNCTION PANEL: CPT

## 2018-12-01 PROCEDURE — 83690 ASSAY OF LIPASE: CPT

## 2018-12-01 PROCEDURE — 99284 EMERGENCY DEPT VISIT MOD MDM: CPT

## 2018-12-01 PROCEDURE — G0480 DRUG TEST DEF 1-7 CLASSES: HCPCS

## 2018-12-01 PROCEDURE — 85025 COMPLETE CBC W/AUTO DIFF WBC: CPT

## 2018-12-01 PROCEDURE — 80048 BASIC METABOLIC PNL TOTAL CA: CPT

## 2018-12-01 PROCEDURE — 36415 COLL VENOUS BLD VENIPUNCTURE: CPT

## 2018-12-01 PROCEDURE — 80307 DRUG TEST PRSMV CHEM ANLYZR: CPT

## 2018-12-01 PROCEDURE — 82150 ASSAY OF AMYLASE: CPT

## 2018-12-01 PROCEDURE — 81001 URINALYSIS AUTO W/SCOPE: CPT

## 2018-12-01 RX ORDER — LORAZEPAM 1 MG/1
1 TABLET ORAL EVERY 6 HOURS PRN
COMMUNITY
End: 2019-07-27

## 2018-12-01 NOTE — ED NOTES
Patient to the ER with c/c of general malaise today when waking up. Patient is very non-specific of any type of symptoms. Patient presents to the ER with his elderly parents. Patient is a/o x 3, patent airway, speaking clearly in complete sentences. Patient denies any CP or dyspnea. Lungs are clear and equal bilaterally to auscultation, HT are S/R, A=R. Abdomen is soft, non-tender. No NVD in last 24 hours. Normal Bowel and Bladder habits. Patient has strong PMS x 4. No peripheral edema is appreciated. Patient was ambulatory into the ER today without distress. Patient has a hx of alcoholism and has not had a drink for a week. Denies any shaking or tremors.      Holly Castro RN  12/01/18 101

## 2018-12-05 ENCOUNTER — HOSPITAL ENCOUNTER (OUTPATIENT)
Dept: ULTRASOUND IMAGING | Age: 53
Discharge: HOME | End: 2018-12-07
Payer: COMMERCIAL

## 2018-12-05 ENCOUNTER — HOSPITAL ENCOUNTER (OUTPATIENT)
Age: 53
Discharge: HOME | End: 2018-12-05
Payer: COMMERCIAL

## 2018-12-05 DIAGNOSIS — R94.5 NONSPECIFIC ABNORMAL RESULTS OF LIVER FUNCTION STUDY: ICD-10-CM

## 2018-12-05 LAB
ALBUMIN SERPL-MCNC: 4.1 G/DL (ref 3.5–5.2)
ALBUMIN/GLOBULIN RATIO: 1.2 (ref 1–2.5)
ALP BLD-CCNC: 50 U/L (ref 40–129)
ALT SERPL-CCNC: 117 U/L (ref 5–41)
AST SERPL-CCNC: 64 U/L
BILIRUB SERPL-MCNC: 0.62 MG/DL (ref 0.3–1.2)
BILIRUBIN DIRECT: 0.29 MG/DL
BILIRUBIN, INDIRECT: 0.33 MG/DL (ref 0–1)
GLOBULIN: ABNORMAL G/DL (ref 1.5–3.8)
HBV SURFACE AB TITR SER: <3.5 MIU/ML
HEPATITIS C ANTIBODY: NONREACTIVE
HIV AG/AB: NONREACTIVE
TOTAL PROTEIN: 7.4 G/DL (ref 6.4–8.3)

## 2018-12-05 PROCEDURE — 86803 HEPATITIS C AB TEST: CPT

## 2018-12-05 PROCEDURE — 86317 IMMUNOASSAY INFECTIOUS AGENT: CPT

## 2018-12-05 PROCEDURE — 76705 ECHO EXAM OF ABDOMEN: CPT

## 2018-12-05 PROCEDURE — 87340 HEPATITIS B SURFACE AG IA: CPT

## 2018-12-05 PROCEDURE — 80076 HEPATIC FUNCTION PANEL: CPT

## 2018-12-05 PROCEDURE — 87389 HIV-1 AG W/HIV-1&-2 AB AG IA: CPT

## 2018-12-05 PROCEDURE — 36415 COLL VENOUS BLD VENIPUNCTURE: CPT

## 2018-12-07 LAB — HEPATITIS B SURFACE ANTIGEN: NONREACTIVE

## 2018-12-26 ENCOUNTER — HOSPITAL ENCOUNTER (OUTPATIENT)
Age: 53
Setting detail: SPECIMEN
Discharge: HOME | End: 2018-12-26
Payer: COMMERCIAL

## 2018-12-26 LAB
ALBUMIN SERPL-MCNC: 4.6 G/DL (ref 3.5–5.2)
ALBUMIN/GLOBULIN RATIO: 1.7 (ref 1–2.5)
ALP BLD-CCNC: 47 U/L (ref 40–129)
ALT SERPL-CCNC: 32 U/L (ref 5–41)
AST SERPL-CCNC: 25 U/L
BILIRUB SERPL-MCNC: 0.47 MG/DL (ref 0.3–1.2)
BILIRUBIN DIRECT: 0.13 MG/DL
BILIRUBIN, INDIRECT: 0.34 MG/DL (ref 0–1)
GLOBULIN: NORMAL G/DL (ref 1.5–3.8)
TOTAL PROTEIN: 7.3 G/DL (ref 6.4–8.3)

## 2019-02-08 ENCOUNTER — HOSPITAL ENCOUNTER (OUTPATIENT)
Age: 54
Setting detail: SPECIMEN
Discharge: HOME | End: 2019-02-08
Payer: COMMERCIAL

## 2019-02-08 LAB
ALBUMIN SERPL-MCNC: 4.5 G/DL (ref 3.5–5.2)
ALBUMIN/GLOBULIN RATIO: 1.8 (ref 1–2.5)
ALP BLD-CCNC: 46 U/L (ref 40–129)
ALT SERPL-CCNC: 22 U/L (ref 5–41)
ANION GAP SERPL CALCULATED.3IONS-SCNC: 11 MMOL/L (ref 9–17)
AST SERPL-CCNC: 18 U/L
BILIRUB SERPL-MCNC: 0.38 MG/DL (ref 0.3–1.2)
BILIRUBIN DIRECT: 0.11 MG/DL
BILIRUBIN, INDIRECT: 0.27 MG/DL (ref 0–1)
BUN BLDV-MCNC: 15 MG/DL (ref 6–20)
CHLORIDE BLD-SCNC: 103 MMOL/L (ref 98–107)
CHOLESTEROL/HDL RATIO: 5.4
CHOLESTEROL: 195 MG/DL
CO2: 26 MMOL/L (ref 20–31)
CREAT SERPL-MCNC: 0.48 MG/DL (ref 0.7–1.2)
ESTIMATED AVERAGE GLUCOSE: 100 MG/DL
GFR AFRICAN AMERICAN: >60 ML/MIN
GFR NON-AFRICAN AMERICAN: >60 ML/MIN
GFR SERPL CREATININE-BSD FRML MDRD: ABNORMAL ML/MIN/{1.73_M2}
GFR SERPL CREATININE-BSD FRML MDRD: ABNORMAL ML/MIN/{1.73_M2}
GLOBULIN: NORMAL G/DL (ref 1.5–3.8)
GLUCOSE BLD-MCNC: 102 MG/DL (ref 70–99)
HBA1C MFR BLD: 5.1 % (ref 4–6)
HCT VFR BLD CALC: 46.4 % (ref 40.7–50.3)
HDLC SERPL-MCNC: 36 MG/DL
HEMOGLOBIN: 15.5 G/DL (ref 13–17)
IRON: 77 UG/DL (ref 59–158)
LDL CHOLESTEROL: 123 MG/DL (ref 0–130)
MCH RBC QN AUTO: 30.4 PG (ref 25.2–33.5)
MCHC RBC AUTO-ENTMCNC: 33.4 G/DL (ref 28.4–34.8)
MCV RBC AUTO: 91 FL (ref 82.6–102.9)
NRBC AUTOMATED: 0 PER 100 WBC
PDW BLD-RTO: 12.1 % (ref 11.8–14.4)
PLATELET # BLD: 248 K/UL (ref 138–453)
PMV BLD AUTO: 9.7 FL (ref 8.1–13.5)
POTASSIUM SERPL-SCNC: 4.5 MMOL/L (ref 3.7–5.3)
RBC # BLD: 5.1 M/UL (ref 4.21–5.77)
SODIUM BLD-SCNC: 140 MMOL/L (ref 135–144)
TOTAL PROTEIN: 7 G/DL (ref 6.4–8.3)
TRIGL SERPL-MCNC: 180 MG/DL
URIC ACID: 6.5 MG/DL (ref 3.4–7)
VITAMIN B-12: 389 PG/ML (ref 232–1245)
VLDLC SERPL CALC-MCNC: ABNORMAL MG/DL (ref 1–30)
WBC # BLD: 6.1 K/UL (ref 3.5–11.3)

## 2019-02-13 LAB — VITAMIN B1 WHOLE BLOOD: 105 NMOL/L (ref 70–180)

## 2019-05-03 ENCOUNTER — HOSPITAL ENCOUNTER (OUTPATIENT)
Age: 54
Discharge: HOME | End: 2019-05-03
Payer: COMMERCIAL

## 2019-05-03 LAB
ALBUMIN SERPL-MCNC: 4.4 G/DL (ref 3.5–5.2)
ALBUMIN/GLOBULIN RATIO: 1.8 (ref 1–2.5)
ALP BLD-CCNC: 45 U/L (ref 40–129)
ALT SERPL-CCNC: 19 U/L (ref 5–41)
ANION GAP SERPL CALCULATED.3IONS-SCNC: 14 MMOL/L (ref 9–17)
AST SERPL-CCNC: 16 U/L
BILIRUB SERPL-MCNC: 0.36 MG/DL (ref 0.3–1.2)
BILIRUBIN DIRECT: 0.1 MG/DL
BILIRUBIN, INDIRECT: 0.26 MG/DL (ref 0–1)
BUN BLDV-MCNC: 15 MG/DL (ref 6–20)
CALCIUM SERPL-MCNC: 9.5 MG/DL (ref 8.6–10.4)
CHLORIDE BLD-SCNC: 108 MMOL/L (ref 98–107)
CHOLESTEROL, FASTING: 173 MG/DL
CHOLESTEROL/HDL RATIO: 5.1
CO2: 23 MMOL/L (ref 20–31)
CREAT SERPL-MCNC: 0.48 MG/DL (ref 0.7–1.2)
FOLATE: >20 NG/ML
GFR AFRICAN AMERICAN: >60 ML/MIN
GFR NON-AFRICAN AMERICAN: >60 ML/MIN
GFR SERPL CREATININE-BSD FRML MDRD: ABNORMAL ML/MIN/{1.73_M2}
GFR SERPL CREATININE-BSD FRML MDRD: ABNORMAL ML/MIN/{1.73_M2}
GLOBULIN: NORMAL G/DL (ref 1.5–3.8)
GLUCOSE BLD-MCNC: 102 MG/DL (ref 70–99)
HCT VFR BLD CALC: 46.5 % (ref 40.7–50.3)
HDLC SERPL-MCNC: 34 MG/DL
HEMOGLOBIN: 14.9 G/DL (ref 13–17)
IRON: 69 UG/DL (ref 59–158)
LDL CHOLESTEROL: 109 MG/DL (ref 0–130)
MCH RBC QN AUTO: 28 PG (ref 25.2–33.5)
MCHC RBC AUTO-ENTMCNC: 32 G/DL (ref 28.4–34.8)
MCV RBC AUTO: 87.4 FL (ref 82.6–102.9)
NRBC AUTOMATED: 0 PER 100 WBC
PDW BLD-RTO: 13.5 % (ref 11.8–14.4)
PLATELET # BLD: 222 K/UL (ref 138–453)
PMV BLD AUTO: 10.2 FL (ref 8.1–13.5)
POTASSIUM SERPL-SCNC: 4.9 MMOL/L (ref 3.7–5.3)
RBC # BLD: 5.32 M/UL (ref 4.21–5.77)
SODIUM BLD-SCNC: 145 MMOL/L (ref 135–144)
TOTAL PROTEIN: 6.8 G/DL (ref 6.4–8.3)
TRIGLYCERIDE, FASTING: 148 MG/DL
URIC ACID: 6.2 MG/DL (ref 3.4–7)
VITAMIN B-12: 330 PG/ML (ref 232–1245)
VLDLC SERPL CALC-MCNC: ABNORMAL MG/DL (ref 1–30)
WBC # BLD: 6.9 K/UL (ref 3.5–11.3)

## 2019-05-03 PROCEDURE — 80051 ELECTROLYTE PANEL: CPT

## 2019-05-03 PROCEDURE — 83036 HEMOGLOBIN GLYCOSYLATED A1C: CPT

## 2019-05-03 PROCEDURE — 84550 ASSAY OF BLOOD/URIC ACID: CPT

## 2019-05-03 PROCEDURE — 82746 ASSAY OF FOLIC ACID SERUM: CPT

## 2019-05-03 PROCEDURE — 83540 ASSAY OF IRON: CPT

## 2019-05-03 PROCEDURE — 82310 ASSAY OF CALCIUM: CPT

## 2019-05-03 PROCEDURE — 80061 LIPID PANEL: CPT

## 2019-05-03 PROCEDURE — 80076 HEPATIC FUNCTION PANEL: CPT

## 2019-05-03 PROCEDURE — 84425 ASSAY OF VITAMIN B-1: CPT

## 2019-05-03 PROCEDURE — 84520 ASSAY OF UREA NITROGEN: CPT

## 2019-05-03 PROCEDURE — 36415 COLL VENOUS BLD VENIPUNCTURE: CPT

## 2019-05-03 PROCEDURE — 85027 COMPLETE CBC AUTOMATED: CPT

## 2019-05-03 PROCEDURE — 82607 VITAMIN B-12: CPT

## 2019-05-03 PROCEDURE — 82947 ASSAY GLUCOSE BLOOD QUANT: CPT

## 2019-05-03 PROCEDURE — 82565 ASSAY OF CREATININE: CPT

## 2019-05-04 LAB
ESTIMATED AVERAGE GLUCOSE: 114 MG/DL
HBA1C MFR BLD: 5.6 % (ref 4–6)

## 2019-05-08 LAB — VITAMIN B1 WHOLE BLOOD: 126 NMOL/L (ref 70–180)

## 2019-07-27 ENCOUNTER — HOSPITAL ENCOUNTER (EMERGENCY)
Age: 54
Discharge: HOME | End: 2019-07-27
Attending: EMERGENCY MEDICINE
Payer: COMMERCIAL

## 2019-07-27 ENCOUNTER — APPOINTMENT (OUTPATIENT)
Dept: GENERAL RADIOLOGY | Age: 54
End: 2019-07-27
Payer: COMMERCIAL

## 2019-07-27 VITALS
TEMPERATURE: 97.6 F | DIASTOLIC BLOOD PRESSURE: 89 MMHG | RESPIRATION RATE: 16 BRPM | OXYGEN SATURATION: 98 % | HEIGHT: 69 IN | BODY MASS INDEX: 37.77 KG/M2 | HEART RATE: 63 BPM | SYSTOLIC BLOOD PRESSURE: 125 MMHG | WEIGHT: 255 LBS

## 2019-07-27 DIAGNOSIS — M70.62 TROCHANTERIC BURSITIS OF LEFT HIP: Primary | ICD-10-CM

## 2019-07-27 PROCEDURE — 6370000000 HC RX 637 (ALT 250 FOR IP): Performed by: EMERGENCY MEDICINE

## 2019-07-27 PROCEDURE — 6360000002 HC RX W HCPCS: Performed by: EMERGENCY MEDICINE

## 2019-07-27 PROCEDURE — 99283 EMERGENCY DEPT VISIT LOW MDM: CPT

## 2019-07-27 PROCEDURE — 73502 X-RAY EXAM HIP UNI 2-3 VIEWS: CPT

## 2019-07-27 PROCEDURE — 96372 THER/PROPH/DIAG INJ SC/IM: CPT

## 2019-07-27 RX ORDER — NAPROXEN 500 MG/1
500 TABLET ORAL 2 TIMES DAILY
Qty: 10 TABLET | Refills: 0 | Status: SHIPPED | OUTPATIENT
Start: 2019-07-27 | End: 2021-12-18 | Stop reason: ALTCHOICE

## 2019-07-27 RX ORDER — PREDNISONE 20 MG/1
40 TABLET ORAL ONCE
Status: COMPLETED | OUTPATIENT
Start: 2019-07-27 | End: 2019-07-27

## 2019-07-27 RX ORDER — KETOROLAC TROMETHAMINE 15 MG/ML
15 INJECTION, SOLUTION INTRAMUSCULAR; INTRAVENOUS ONCE
Status: COMPLETED | OUTPATIENT
Start: 2019-07-27 | End: 2019-07-27

## 2019-07-27 RX ORDER — PREDNISONE 50 MG/1
50 TABLET ORAL DAILY
Qty: 4 TABLET | Refills: 0 | Status: SHIPPED | OUTPATIENT
Start: 2019-07-27 | End: 2019-07-31

## 2019-07-27 RX ADMIN — PREDNISONE 40 MG: 20 TABLET ORAL at 06:48

## 2019-07-27 RX ADMIN — KETOROLAC TROMETHAMINE 15 MG: 15 INJECTION, SOLUTION INTRAMUSCULAR; INTRAVENOUS at 06:48

## 2019-07-27 RX ADMIN — KETOROLAC TROMETHAMINE 15 MG: 15 INJECTION, SOLUTION INTRAMUSCULAR; INTRAVENOUS at 06:47

## 2019-07-27 ASSESSMENT — PAIN DESCRIPTION - ORIENTATION: ORIENTATION: LEFT

## 2019-07-27 ASSESSMENT — PAIN DESCRIPTION - LOCATION
LOCATION: HIP
LOCATION: HIP

## 2019-07-27 ASSESSMENT — ENCOUNTER SYMPTOMS: COLOR CHANGE: 0

## 2019-07-27 ASSESSMENT — PAIN SCALES - GENERAL
PAINLEVEL_OUTOF10: 8
PAINLEVEL_OUTOF10: 8
PAINLEVEL_OUTOF10: 7

## 2019-07-27 ASSESSMENT — PAIN DESCRIPTION - DESCRIPTORS: DESCRIPTORS: ACHING

## 2019-07-27 ASSESSMENT — PAIN DESCRIPTION - PAIN TYPE: TYPE: ACUTE PAIN

## 2019-08-23 ENCOUNTER — HOSPITAL ENCOUNTER (OUTPATIENT)
Age: 54
Setting detail: SPECIMEN
Discharge: HOME | End: 2019-08-23
Payer: COMMERCIAL

## 2019-08-23 LAB
ALBUMIN SERPL-MCNC: 4.4 G/DL (ref 3.5–5.2)
ALBUMIN/GLOBULIN RATIO: 1.6 (ref 1–2.5)
ALP BLD-CCNC: 45 U/L (ref 40–129)
ALT SERPL-CCNC: 21 U/L (ref 5–41)
ANION GAP SERPL CALCULATED.3IONS-SCNC: 15 MMOL/L (ref 9–17)
AST SERPL-CCNC: 21 U/L
BILIRUB SERPL-MCNC: 0.46 MG/DL (ref 0.3–1.2)
BILIRUBIN DIRECT: 0.09 MG/DL
BILIRUBIN, INDIRECT: 0.37 MG/DL (ref 0–1)
BUN BLDV-MCNC: 14 MG/DL (ref 6–20)
CALCIUM SERPL-MCNC: 9.4 MG/DL (ref 8.6–10.4)
CHLORIDE BLD-SCNC: 105 MMOL/L (ref 98–107)
CHOLESTEROL/HDL RATIO: 5.7
CHOLESTEROL: 211 MG/DL
CO2: 22 MMOL/L (ref 20–31)
CREAT SERPL-MCNC: 0.51 MG/DL (ref 0.7–1.2)
ESTIMATED AVERAGE GLUCOSE: 108 MG/DL
GFR AFRICAN AMERICAN: >60 ML/MIN
GFR NON-AFRICAN AMERICAN: >60 ML/MIN
GFR SERPL CREATININE-BSD FRML MDRD: ABNORMAL ML/MIN/{1.73_M2}
GFR SERPL CREATININE-BSD FRML MDRD: ABNORMAL ML/MIN/{1.73_M2}
GLOBULIN: NORMAL G/DL (ref 1.5–3.8)
GLUCOSE BLD-MCNC: 99 MG/DL (ref 70–99)
HBA1C MFR BLD: 5.4 % (ref 4–6)
HCT VFR BLD CALC: 46.4 % (ref 40.7–50.3)
HDLC SERPL-MCNC: 37 MG/DL
HEMOGLOBIN: 14.9 G/DL (ref 13–17)
IRON: 99 UG/DL (ref 59–158)
LDL CHOLESTEROL: 128 MG/DL (ref 0–130)
MCH RBC QN AUTO: 29 PG (ref 25.2–33.5)
MCHC RBC AUTO-ENTMCNC: 32.1 G/DL (ref 28.4–34.8)
MCV RBC AUTO: 90.4 FL (ref 82.6–102.9)
NRBC AUTOMATED: 0 PER 100 WBC
PDW BLD-RTO: 13.5 % (ref 11.8–14.4)
PLATELET # BLD: 248 K/UL (ref 138–453)
PMV BLD AUTO: 9.7 FL (ref 8.1–13.5)
POTASSIUM SERPL-SCNC: 4.5 MMOL/L (ref 3.7–5.3)
PROSTATE SPECIFIC ANTIGEN: 0.66 UG/L
PROSTATE SPECIFIC ANTIGEN: 0.66 UG/L
RBC # BLD: 5.13 M/UL (ref 4.21–5.77)
SODIUM BLD-SCNC: 142 MMOL/L (ref 135–144)
TOTAL PROTEIN: 7.2 G/DL (ref 6.4–8.3)
TRIGL SERPL-MCNC: 229 MG/DL
URIC ACID: 6.3 MG/DL (ref 3.4–7)
VITAMIN B-12: 383 PG/ML (ref 232–1245)
VLDLC SERPL CALC-MCNC: ABNORMAL MG/DL (ref 1–30)
WBC # BLD: 6.6 K/UL (ref 3.5–11.3)

## 2019-08-23 PROCEDURE — 84425 ASSAY OF VITAMIN B-1: CPT

## 2019-08-23 PROCEDURE — 80061 LIPID PANEL: CPT

## 2019-08-23 PROCEDURE — 84520 ASSAY OF UREA NITROGEN: CPT

## 2019-08-23 PROCEDURE — 36415 COLL VENOUS BLD VENIPUNCTURE: CPT

## 2019-08-23 PROCEDURE — G0103 PSA SCREENING: HCPCS

## 2019-08-23 PROCEDURE — 85027 COMPLETE CBC AUTOMATED: CPT

## 2019-08-23 PROCEDURE — 83036 HEMOGLOBIN GLYCOSYLATED A1C: CPT

## 2019-08-23 PROCEDURE — 82310 ASSAY OF CALCIUM: CPT

## 2019-08-23 PROCEDURE — 80076 HEPATIC FUNCTION PANEL: CPT

## 2019-08-23 PROCEDURE — 82607 VITAMIN B-12: CPT

## 2019-08-23 PROCEDURE — 84550 ASSAY OF BLOOD/URIC ACID: CPT

## 2019-08-23 PROCEDURE — 80051 ELECTROLYTE PANEL: CPT

## 2019-08-23 PROCEDURE — 83540 ASSAY OF IRON: CPT

## 2019-08-23 PROCEDURE — 82565 ASSAY OF CREATININE: CPT

## 2019-08-23 PROCEDURE — 82947 ASSAY GLUCOSE BLOOD QUANT: CPT

## 2019-08-27 LAB — VITAMIN B1 WHOLE BLOOD: 143 NMOL/L (ref 70–180)

## 2019-09-22 ENCOUNTER — HOSPITAL ENCOUNTER (OUTPATIENT)
Dept: GENERAL RADIOLOGY | Age: 54
Discharge: HOME | End: 2019-09-24
Payer: COMMERCIAL

## 2019-09-22 ENCOUNTER — HOSPITAL ENCOUNTER (OUTPATIENT)
Age: 54
Discharge: HOME | End: 2019-09-24
Payer: COMMERCIAL

## 2019-09-22 DIAGNOSIS — M25.561 RIGHT KNEE PAIN, UNSPECIFIED CHRONICITY: ICD-10-CM

## 2019-09-22 PROCEDURE — 73562 X-RAY EXAM OF KNEE 3: CPT

## 2019-10-07 DIAGNOSIS — M25.561 ACUTE PAIN OF RIGHT KNEE: Primary | ICD-10-CM

## 2019-10-09 ENCOUNTER — OFFICE VISIT (OUTPATIENT)
Dept: ORTHOPEDIC SURGERY | Age: 54
End: 2019-10-09
Payer: COMMERCIAL

## 2019-10-09 VITALS — WEIGHT: 255.07 LBS | HEIGHT: 69 IN | BODY MASS INDEX: 37.78 KG/M2

## 2019-10-09 DIAGNOSIS — M17.11 PRIMARY OSTEOARTHRITIS OF RIGHT KNEE: Primary | ICD-10-CM

## 2019-10-09 DIAGNOSIS — M95.8 OSTEOCHONDRAL DEFECT: ICD-10-CM

## 2019-10-09 PROCEDURE — 99243 OFF/OP CNSLTJ NEW/EST LOW 30: CPT | Performed by: ORTHOPAEDIC SURGERY

## 2019-10-09 ASSESSMENT — ENCOUNTER SYMPTOMS
DIARRHEA: 0
NAUSEA: 0
COUGH: 0
CONSTIPATION: 0

## 2019-11-15 ENCOUNTER — HOSPITAL ENCOUNTER (OUTPATIENT)
Age: 54
Discharge: HOME | End: 2019-11-15
Payer: COMMERCIAL

## 2019-11-15 LAB
ALBUMIN SERPL-MCNC: 4.4 G/DL (ref 3.5–5.2)
ALBUMIN/GLOBULIN RATIO: 1.6 (ref 1–2.5)
ALP BLD-CCNC: 45 U/L (ref 40–129)
ALT SERPL-CCNC: 19 U/L (ref 5–41)
ANION GAP SERPL CALCULATED.3IONS-SCNC: 14 MMOL/L (ref 9–17)
AST SERPL-CCNC: 16 U/L
BILIRUB SERPL-MCNC: 0.45 MG/DL (ref 0.3–1.2)
BILIRUBIN DIRECT: 0.11 MG/DL
BILIRUBIN, INDIRECT: 0.34 MG/DL (ref 0–1)
BUN BLDV-MCNC: 16 MG/DL (ref 6–20)
CHLORIDE BLD-SCNC: 107 MMOL/L (ref 98–107)
CHOLESTEROL/HDL RATIO: 5.8
CHOLESTEROL: 209 MG/DL
CO2: 23 MMOL/L (ref 20–31)
CREAT SERPL-MCNC: 0.47 MG/DL (ref 0.7–1.2)
ESTIMATED AVERAGE GLUCOSE: 114 MG/DL
GFR AFRICAN AMERICAN: >60 ML/MIN
GFR NON-AFRICAN AMERICAN: >60 ML/MIN
GFR SERPL CREATININE-BSD FRML MDRD: ABNORMAL ML/MIN/{1.73_M2}
GFR SERPL CREATININE-BSD FRML MDRD: ABNORMAL ML/MIN/{1.73_M2}
GLOBULIN: NORMAL G/DL (ref 1.5–3.8)
GLUCOSE BLD-MCNC: 99 MG/DL (ref 70–99)
HBA1C MFR BLD: 5.6 % (ref 4–6)
HCT VFR BLD CALC: 45.5 % (ref 40.7–50.3)
HDLC SERPL-MCNC: 36 MG/DL
HEMOGLOBIN: 15.2 G/DL (ref 13–17)
IRON: 93 UG/DL (ref 59–158)
LDL CHOLESTEROL: 130 MG/DL (ref 0–130)
MCH RBC QN AUTO: 29.8 PG (ref 25.2–33.5)
MCHC RBC AUTO-ENTMCNC: 33.4 G/DL (ref 28.4–34.8)
MCV RBC AUTO: 89.2 FL (ref 82.6–102.9)
NRBC AUTOMATED: 0 PER 100 WBC
PDW BLD-RTO: 13.2 % (ref 11.8–14.4)
PLATELET # BLD: 238 K/UL (ref 138–453)
PMV BLD AUTO: 9.8 FL (ref 8.1–13.5)
POTASSIUM SERPL-SCNC: 4.8 MMOL/L (ref 3.7–5.3)
RBC # BLD: 5.1 M/UL (ref 4.21–5.77)
SODIUM BLD-SCNC: 144 MMOL/L (ref 135–144)
TOTAL PROTEIN: 7.2 G/DL (ref 6.4–8.3)
TRIGL SERPL-MCNC: 213 MG/DL
URIC ACID: 6 MG/DL (ref 3.4–7)
VITAMIN B-12: 480 PG/ML (ref 232–1245)
VLDLC SERPL CALC-MCNC: ABNORMAL MG/DL (ref 1–30)
WBC # BLD: 7.2 K/UL (ref 3.5–11.3)

## 2019-11-15 PROCEDURE — 85027 COMPLETE CBC AUTOMATED: CPT

## 2019-11-15 PROCEDURE — 36415 COLL VENOUS BLD VENIPUNCTURE: CPT

## 2019-11-15 PROCEDURE — 82947 ASSAY GLUCOSE BLOOD QUANT: CPT

## 2019-11-15 PROCEDURE — 82607 VITAMIN B-12: CPT

## 2019-11-15 PROCEDURE — 84425 ASSAY OF VITAMIN B-1: CPT

## 2019-11-15 PROCEDURE — 83540 ASSAY OF IRON: CPT

## 2019-11-15 PROCEDURE — 84520 ASSAY OF UREA NITROGEN: CPT

## 2019-11-15 PROCEDURE — 80061 LIPID PANEL: CPT

## 2019-11-15 PROCEDURE — 82565 ASSAY OF CREATININE: CPT

## 2019-11-15 PROCEDURE — 84550 ASSAY OF BLOOD/URIC ACID: CPT

## 2019-11-15 PROCEDURE — 83036 HEMOGLOBIN GLYCOSYLATED A1C: CPT

## 2019-11-15 PROCEDURE — 80051 ELECTROLYTE PANEL: CPT

## 2019-11-15 PROCEDURE — 80076 HEPATIC FUNCTION PANEL: CPT

## 2019-11-18 LAB — VITAMIN B1 WHOLE BLOOD: 160 NMOL/L (ref 70–180)

## 2020-02-21 ENCOUNTER — HOSPITAL ENCOUNTER (OUTPATIENT)
Age: 55
Setting detail: SPECIMEN
Discharge: HOME | End: 2020-02-21
Payer: COMMERCIAL

## 2020-02-21 LAB
ALBUMIN SERPL-MCNC: 4.2 G/DL (ref 3.5–5.2)
ALBUMIN/GLOBULIN RATIO: 1.6 (ref 1–2.5)
ALP BLD-CCNC: 47 U/L (ref 40–129)
ALT SERPL-CCNC: 22 U/L (ref 5–41)
ANION GAP SERPL CALCULATED.3IONS-SCNC: 13 MMOL/L (ref 9–17)
AST SERPL-CCNC: 18 U/L
BILIRUB SERPL-MCNC: 0.37 MG/DL (ref 0.3–1.2)
BILIRUBIN DIRECT: 0.1 MG/DL
BILIRUBIN, INDIRECT: 0.27 MG/DL (ref 0–1)
BUN BLDV-MCNC: 15 MG/DL (ref 6–20)
CHLORIDE BLD-SCNC: 108 MMOL/L (ref 98–107)
CHOLESTEROL/HDL RATIO: 5.7
CHOLESTEROL: 199 MG/DL
CO2: 21 MMOL/L (ref 20–31)
CREAT SERPL-MCNC: 0.47 MG/DL (ref 0.7–1.2)
ESTIMATED AVERAGE GLUCOSE: 111 MG/DL
GFR AFRICAN AMERICAN: >60 ML/MIN
GFR NON-AFRICAN AMERICAN: >60 ML/MIN
GFR SERPL CREATININE-BSD FRML MDRD: ABNORMAL ML/MIN/{1.73_M2}
GFR SERPL CREATININE-BSD FRML MDRD: ABNORMAL ML/MIN/{1.73_M2}
GLOBULIN: NORMAL G/DL (ref 1.5–3.8)
GLUCOSE BLD-MCNC: 94 MG/DL (ref 70–99)
HBA1C MFR BLD: 5.5 % (ref 4–6)
HCT VFR BLD CALC: 45.9 % (ref 40.7–50.3)
HDLC SERPL-MCNC: 35 MG/DL
HEMOGLOBIN: 15.2 G/DL (ref 13–17)
IRON: 85 UG/DL (ref 59–158)
LDL CHOLESTEROL: 119 MG/DL (ref 0–130)
MCH RBC QN AUTO: 29.9 PG (ref 25.2–33.5)
MCHC RBC AUTO-ENTMCNC: 33.1 G/DL (ref 28.4–34.8)
MCV RBC AUTO: 90.2 FL (ref 82.6–102.9)
NRBC AUTOMATED: 0 PER 100 WBC
PDW BLD-RTO: 13.2 % (ref 11.8–14.4)
PLATELET # BLD: 251 K/UL (ref 138–453)
PMV BLD AUTO: 9.5 FL (ref 8.1–13.5)
POTASSIUM SERPL-SCNC: 4.6 MMOL/L (ref 3.7–5.3)
RBC # BLD: 5.09 M/UL (ref 4.21–5.77)
SODIUM BLD-SCNC: 142 MMOL/L (ref 135–144)
TOTAL PROTEIN: 6.8 G/DL (ref 6.4–8.3)
TRIGL SERPL-MCNC: 227 MG/DL
URIC ACID: 6.4 MG/DL (ref 3.4–7)
VITAMIN B-12: 438 PG/ML (ref 232–1245)
VLDLC SERPL CALC-MCNC: ABNORMAL MG/DL (ref 1–30)
WBC # BLD: 7.6 K/UL (ref 3.5–11.3)

## 2020-02-26 LAB — VITAMIN B1 WHOLE BLOOD: 156 NMOL/L (ref 70–180)

## 2020-03-25 PROBLEM — E78.5 HYPERLIPIDEMIA: Status: RESOLVED | Noted: 2020-03-25 | Resolved: 2020-03-24

## 2020-05-15 ENCOUNTER — HOSPITAL ENCOUNTER (OUTPATIENT)
Age: 55
Discharge: HOME | End: 2020-05-15
Payer: COMMERCIAL

## 2020-05-15 LAB
ALBUMIN SERPL-MCNC: 4.2 G/DL (ref 3.5–5.2)
ALBUMIN/GLOBULIN RATIO: 1.6 (ref 1–2.5)
ALP BLD-CCNC: 49 U/L (ref 40–129)
ALT SERPL-CCNC: 26 U/L (ref 5–41)
ANION GAP SERPL CALCULATED.3IONS-SCNC: 15 MMOL/L (ref 9–17)
AST SERPL-CCNC: 20 U/L
BILIRUB SERPL-MCNC: 0.39 MG/DL (ref 0.3–1.2)
BILIRUBIN DIRECT: 0.11 MG/DL
BILIRUBIN, INDIRECT: 0.28 MG/DL (ref 0–1)
BUN BLDV-MCNC: 12 MG/DL (ref 6–20)
CHLORIDE BLD-SCNC: 105 MMOL/L (ref 98–107)
CHOLESTEROL, FASTING: 207 MG/DL
CHOLESTEROL/HDL RATIO: 5.9
CO2: 21 MMOL/L (ref 20–31)
CREAT SERPL-MCNC: 0.48 MG/DL (ref 0.7–1.2)
GFR AFRICAN AMERICAN: >60 ML/MIN
GFR NON-AFRICAN AMERICAN: >60 ML/MIN
GFR SERPL CREATININE-BSD FRML MDRD: ABNORMAL ML/MIN/{1.73_M2}
GFR SERPL CREATININE-BSD FRML MDRD: ABNORMAL ML/MIN/{1.73_M2}
GLOBULIN: NORMAL G/DL (ref 1.5–3.8)
GLUCOSE FASTING: 101 MG/DL (ref 70–99)
HCT VFR BLD CALC: 46.6 % (ref 40.7–50.3)
HDLC SERPL-MCNC: 35 MG/DL
HEMOGLOBIN: 15.8 G/DL (ref 13–17)
IRON: 77 UG/DL (ref 59–158)
LDL CHOLESTEROL: 135 MG/DL (ref 0–130)
MCH RBC QN AUTO: 30.3 PG (ref 25.2–33.5)
MCHC RBC AUTO-ENTMCNC: 33.9 G/DL (ref 28.4–34.8)
MCV RBC AUTO: 89.3 FL (ref 82.6–102.9)
NRBC AUTOMATED: 0 PER 100 WBC
PDW BLD-RTO: 12.9 % (ref 11.8–14.4)
PLATELET # BLD: 268 K/UL (ref 138–453)
PMV BLD AUTO: 9.7 FL (ref 8.1–13.5)
POTASSIUM SERPL-SCNC: 4.9 MMOL/L (ref 3.7–5.3)
RBC # BLD: 5.22 M/UL (ref 4.21–5.77)
SODIUM BLD-SCNC: 141 MMOL/L (ref 135–144)
TOTAL PROTEIN: 6.9 G/DL (ref 6.4–8.3)
TRIGLYCERIDE, FASTING: 183 MG/DL
URIC ACID: 5.7 MG/DL (ref 3.4–7)
VITAMIN B-12: 595 PG/ML (ref 232–1245)
VLDLC SERPL CALC-MCNC: ABNORMAL MG/DL (ref 1–30)
WBC # BLD: 8.2 K/UL (ref 3.5–11.3)

## 2020-05-15 PROCEDURE — 82607 VITAMIN B-12: CPT

## 2020-05-15 PROCEDURE — 82947 ASSAY GLUCOSE BLOOD QUANT: CPT

## 2020-05-15 PROCEDURE — 84520 ASSAY OF UREA NITROGEN: CPT

## 2020-05-15 PROCEDURE — 83036 HEMOGLOBIN GLYCOSYLATED A1C: CPT

## 2020-05-15 PROCEDURE — 80061 LIPID PANEL: CPT

## 2020-05-15 PROCEDURE — 80051 ELECTROLYTE PANEL: CPT

## 2020-05-15 PROCEDURE — 82565 ASSAY OF CREATININE: CPT

## 2020-05-15 PROCEDURE — 83540 ASSAY OF IRON: CPT

## 2020-05-15 PROCEDURE — 36415 COLL VENOUS BLD VENIPUNCTURE: CPT

## 2020-05-15 PROCEDURE — 85027 COMPLETE CBC AUTOMATED: CPT

## 2020-05-15 PROCEDURE — 80076 HEPATIC FUNCTION PANEL: CPT

## 2020-05-15 PROCEDURE — 84425 ASSAY OF VITAMIN B-1: CPT

## 2020-05-15 PROCEDURE — 84550 ASSAY OF BLOOD/URIC ACID: CPT

## 2020-05-17 LAB
ESTIMATED AVERAGE GLUCOSE: 105 MG/DL
HBA1C MFR BLD: 5.3 % (ref 4–6)

## 2020-05-18 LAB — VITAMIN B1 WHOLE BLOOD: 164 NMOL/L (ref 70–180)

## 2020-09-04 ENCOUNTER — HOSPITAL ENCOUNTER (OUTPATIENT)
Age: 55
Discharge: HOME | End: 2020-09-04
Payer: COMMERCIAL

## 2020-09-04 LAB
ABSOLUTE EOS #: 0.13 K/UL (ref 0–0.44)
ABSOLUTE IMMATURE GRANULOCYTE: 0.03 K/UL (ref 0–0.3)
ABSOLUTE LYMPH #: 2.48 K/UL (ref 1.1–3.7)
ABSOLUTE MONO #: 0.39 K/UL (ref 0.1–1.2)
ALBUMIN SERPL-MCNC: 4.3 G/DL (ref 3.5–5.2)
ALBUMIN/GLOBULIN RATIO: 1.8 (ref 1–2.5)
ALP BLD-CCNC: 48 U/L (ref 40–129)
ALT SERPL-CCNC: 19 U/L (ref 5–41)
ANION GAP SERPL CALCULATED.3IONS-SCNC: 11 MMOL/L (ref 9–17)
AST SERPL-CCNC: 15 U/L
BASOPHILS # BLD: 0 % (ref 0–2)
BASOPHILS ABSOLUTE: <0.03 K/UL (ref 0–0.2)
BILIRUB SERPL-MCNC: 0.37 MG/DL (ref 0.3–1.2)
BILIRUBIN DIRECT: 0.1 MG/DL
BILIRUBIN, INDIRECT: 0.27 MG/DL (ref 0–1)
BUN BLDV-MCNC: 15 MG/DL (ref 6–20)
CHLORIDE BLD-SCNC: 105 MMOL/L (ref 98–107)
CHOLESTEROL, FASTING: 206 MG/DL
CHOLESTEROL/HDL RATIO: 6.2
CO2: 21 MMOL/L (ref 20–31)
CREAT SERPL-MCNC: 0.52 MG/DL (ref 0.7–1.2)
DIFFERENTIAL TYPE: NORMAL
EOSINOPHILS RELATIVE PERCENT: 2 % (ref 1–4)
ESTIMATED AVERAGE GLUCOSE: 117 MG/DL
GFR AFRICAN AMERICAN: >60 ML/MIN
GFR NON-AFRICAN AMERICAN: >60 ML/MIN
GFR SERPL CREATININE-BSD FRML MDRD: ABNORMAL ML/MIN/{1.73_M2}
GFR SERPL CREATININE-BSD FRML MDRD: ABNORMAL ML/MIN/{1.73_M2}
GLOBULIN: NORMAL G/DL (ref 1.5–3.8)
GLUCOSE BLD-MCNC: 109 MG/DL (ref 70–99)
HBA1C MFR BLD: 5.7 % (ref 4–6)
HCT VFR BLD CALC: 44.5 % (ref 40.7–50.3)
HDLC SERPL-MCNC: 33 MG/DL
HEMOGLOBIN: 15 G/DL (ref 13–17)
IMMATURE GRANULOCYTES: 0 %
IRON: 80 UG/DL (ref 59–158)
LDL CHOLESTEROL: 126 MG/DL (ref 0–130)
LYMPHOCYTES # BLD: 33 % (ref 24–43)
MCH RBC QN AUTO: 29.4 PG (ref 25.2–33.5)
MCHC RBC AUTO-ENTMCNC: 33.7 G/DL (ref 28.4–34.8)
MCV RBC AUTO: 87.3 FL (ref 82.6–102.9)
MONOCYTES # BLD: 5 % (ref 3–12)
NRBC AUTOMATED: 0 PER 100 WBC
PDW BLD-RTO: 12.9 % (ref 11.8–14.4)
PLATELET # BLD: 263 K/UL (ref 138–453)
PLATELET ESTIMATE: NORMAL
PMV BLD AUTO: 9.9 FL (ref 8.1–13.5)
POTASSIUM SERPL-SCNC: 4.6 MMOL/L (ref 3.7–5.3)
RBC # BLD: 5.1 M/UL (ref 4.21–5.77)
RBC # BLD: NORMAL 10*6/UL
SEG NEUTROPHILS: 60 % (ref 36–65)
SEGMENTED NEUTROPHILS ABSOLUTE COUNT: 4.48 K/UL (ref 1.5–8.1)
SODIUM BLD-SCNC: 137 MMOL/L (ref 135–144)
TOTAL PROTEIN: 6.7 G/DL (ref 6.4–8.3)
TRIGLYCERIDE, FASTING: 235 MG/DL
URIC ACID: 5.3 MG/DL (ref 3.4–7)
VITAMIN B-12: 574 PG/ML (ref 232–1245)
VLDLC SERPL CALC-MCNC: ABNORMAL MG/DL (ref 1–30)
WBC # BLD: 7.5 K/UL (ref 3.5–11.3)
WBC # BLD: NORMAL 10*3/UL

## 2020-09-04 PROCEDURE — 84520 ASSAY OF UREA NITROGEN: CPT

## 2020-09-04 PROCEDURE — 82947 ASSAY GLUCOSE BLOOD QUANT: CPT

## 2020-09-04 PROCEDURE — 82565 ASSAY OF CREATININE: CPT

## 2020-09-04 PROCEDURE — 83036 HEMOGLOBIN GLYCOSYLATED A1C: CPT

## 2020-09-04 PROCEDURE — 80061 LIPID PANEL: CPT

## 2020-09-04 PROCEDURE — 84550 ASSAY OF BLOOD/URIC ACID: CPT

## 2020-09-04 PROCEDURE — 85025 COMPLETE CBC W/AUTO DIFF WBC: CPT

## 2020-09-04 PROCEDURE — 84425 ASSAY OF VITAMIN B-1: CPT

## 2020-09-04 PROCEDURE — 80076 HEPATIC FUNCTION PANEL: CPT

## 2020-09-04 PROCEDURE — 80051 ELECTROLYTE PANEL: CPT

## 2020-09-04 PROCEDURE — 82607 VITAMIN B-12: CPT

## 2020-09-04 PROCEDURE — 83540 ASSAY OF IRON: CPT

## 2020-09-04 PROCEDURE — 36415 COLL VENOUS BLD VENIPUNCTURE: CPT

## 2020-09-09 LAB — VITAMIN B1 WHOLE BLOOD: 131 NMOL/L (ref 70–180)

## 2020-12-11 ENCOUNTER — HOSPITAL ENCOUNTER (OUTPATIENT)
Age: 55
Discharge: HOME | End: 2020-12-11
Payer: COMMERCIAL

## 2020-12-11 LAB
ALBUMIN SERPL-MCNC: 4.2 G/DL (ref 3.5–5.2)
ALBUMIN/GLOBULIN RATIO: 1.6 (ref 1–2.5)
ALP BLD-CCNC: 48 U/L (ref 40–129)
ALT SERPL-CCNC: 23 U/L (ref 5–41)
ANION GAP SERPL CALCULATED.3IONS-SCNC: 14 MMOL/L (ref 9–17)
AST SERPL-CCNC: 19 U/L
BILIRUB SERPL-MCNC: 0.33 MG/DL (ref 0.3–1.2)
BILIRUBIN DIRECT: 0.1 MG/DL
BILIRUBIN, INDIRECT: 0.23 MG/DL (ref 0–1)
BUN BLDV-MCNC: 13 MG/DL (ref 6–20)
CALCIUM SERPL-MCNC: 9.3 MG/DL (ref 8.6–10.4)
CHLORIDE BLD-SCNC: 105 MMOL/L (ref 98–107)
CHOLESTEROL, FASTING: 216 MG/DL
CHOLESTEROL/HDL RATIO: 5.7
CO2: 21 MMOL/L (ref 20–31)
CREAT SERPL-MCNC: 0.55 MG/DL (ref 0.7–1.2)
GFR AFRICAN AMERICAN: >60 ML/MIN
GFR NON-AFRICAN AMERICAN: >60 ML/MIN
GFR SERPL CREATININE-BSD FRML MDRD: ABNORMAL ML/MIN/{1.73_M2}
GFR SERPL CREATININE-BSD FRML MDRD: ABNORMAL ML/MIN/{1.73_M2}
GLOBULIN: NORMAL G/DL (ref 1.5–3.8)
GLUCOSE BLD-MCNC: 96 MG/DL (ref 70–99)
HCT VFR BLD CALC: 44.7 % (ref 40.7–50.3)
HDLC SERPL-MCNC: 38 MG/DL
HEMOGLOBIN: 14.9 G/DL (ref 13–17)
IRON: 67 UG/DL (ref 59–158)
LDL CHOLESTEROL: 136 MG/DL (ref 0–130)
MCH RBC QN AUTO: 29.2 PG (ref 25.2–33.5)
MCHC RBC AUTO-ENTMCNC: 33.3 G/DL (ref 28.4–34.8)
MCV RBC AUTO: 87.5 FL (ref 82.6–102.9)
NRBC AUTOMATED: 0 PER 100 WBC
PDW BLD-RTO: 12.8 % (ref 11.8–14.4)
PLATELET # BLD: 265 K/UL (ref 138–453)
PMV BLD AUTO: 10 FL (ref 8.1–13.5)
POTASSIUM SERPL-SCNC: 4.7 MMOL/L (ref 3.7–5.3)
PROSTATE SPECIFIC ANTIGEN: 0.67 UG/L
RBC # BLD: 5.11 M/UL (ref 4.21–5.77)
SODIUM BLD-SCNC: 140 MMOL/L (ref 135–144)
TOTAL PROTEIN: 6.8 G/DL (ref 6.4–8.3)
TRIGLYCERIDE, FASTING: 212 MG/DL
URIC ACID: 5.4 MG/DL (ref 3.4–7)
VITAMIN B-12: 619 PG/ML (ref 232–1245)
VITAMIN D 25-HYDROXY: 32.4 NG/ML (ref 30–100)
VLDLC SERPL CALC-MCNC: ABNORMAL MG/DL (ref 1–30)
WBC # BLD: 8.4 K/UL (ref 3.5–11.3)

## 2020-12-11 PROCEDURE — 84550 ASSAY OF BLOOD/URIC ACID: CPT

## 2020-12-11 PROCEDURE — 83036 HEMOGLOBIN GLYCOSYLATED A1C: CPT

## 2020-12-11 PROCEDURE — 84425 ASSAY OF VITAMIN B-1: CPT

## 2020-12-11 PROCEDURE — 82607 VITAMIN B-12: CPT

## 2020-12-11 PROCEDURE — 84520 ASSAY OF UREA NITROGEN: CPT

## 2020-12-11 PROCEDURE — 80076 HEPATIC FUNCTION PANEL: CPT

## 2020-12-11 PROCEDURE — 82565 ASSAY OF CREATININE: CPT

## 2020-12-11 PROCEDURE — G0103 PSA SCREENING: HCPCS

## 2020-12-11 PROCEDURE — 80051 ELECTROLYTE PANEL: CPT

## 2020-12-11 PROCEDURE — 85027 COMPLETE CBC AUTOMATED: CPT

## 2020-12-11 PROCEDURE — 82310 ASSAY OF CALCIUM: CPT

## 2020-12-11 PROCEDURE — 36415 COLL VENOUS BLD VENIPUNCTURE: CPT

## 2020-12-11 PROCEDURE — 83540 ASSAY OF IRON: CPT

## 2020-12-11 PROCEDURE — 80061 LIPID PANEL: CPT

## 2020-12-11 PROCEDURE — 82947 ASSAY GLUCOSE BLOOD QUANT: CPT

## 2020-12-11 PROCEDURE — 82306 VITAMIN D 25 HYDROXY: CPT

## 2020-12-13 LAB
ESTIMATED AVERAGE GLUCOSE: 114 MG/DL
HBA1C MFR BLD: 5.6 % (ref 4–6)

## 2020-12-16 LAB — VITAMIN B1 WHOLE BLOOD: 186 NMOL/L (ref 70–180)

## 2021-03-19 ENCOUNTER — HOSPITAL ENCOUNTER (OUTPATIENT)
Age: 56
Setting detail: SPECIMEN
Discharge: HOME | End: 2021-03-19
Payer: COMMERCIAL

## 2021-03-19 LAB
ALBUMIN SERPL-MCNC: 4.5 G/DL (ref 3.5–5.2)
ALBUMIN/GLOBULIN RATIO: 1.7 (ref 1–2.5)
ALP BLD-CCNC: 45 U/L (ref 40–129)
ALT SERPL-CCNC: 20 U/L (ref 5–41)
ANION GAP SERPL CALCULATED.3IONS-SCNC: 14 MMOL/L (ref 9–17)
AST SERPL-CCNC: 17 U/L
BILIRUB SERPL-MCNC: 0.41 MG/DL (ref 0.3–1.2)
BUN BLDV-MCNC: 12 MG/DL (ref 6–20)
BUN/CREAT BLD: ABNORMAL (ref 9–20)
CALCIUM SERPL-MCNC: 9.6 MG/DL (ref 8.6–10.4)
CHLORIDE BLD-SCNC: 105 MMOL/L (ref 98–107)
CHOLESTEROL/HDL RATIO: 5.8
CHOLESTEROL: 208 MG/DL
CO2: 24 MMOL/L (ref 20–31)
CREAT SERPL-MCNC: 0.54 MG/DL (ref 0.7–1.2)
ESTIMATED AVERAGE GLUCOSE: 111 MG/DL
GFR AFRICAN AMERICAN: >60 ML/MIN
GFR NON-AFRICAN AMERICAN: >60 ML/MIN
GFR SERPL CREATININE-BSD FRML MDRD: ABNORMAL ML/MIN/{1.73_M2}
GFR SERPL CREATININE-BSD FRML MDRD: ABNORMAL ML/MIN/{1.73_M2}
GLUCOSE BLD-MCNC: 96 MG/DL (ref 70–99)
HBA1C MFR BLD: 5.5 % (ref 4–6)
HCT VFR BLD CALC: 44.7 % (ref 40.7–50.3)
HDLC SERPL-MCNC: 36 MG/DL
HEMOGLOBIN: 15 G/DL (ref 13–17)
IRON: 76 UG/DL (ref 59–158)
LDL CHOLESTEROL: 126 MG/DL (ref 0–130)
MCH RBC QN AUTO: 29.5 PG (ref 25.2–33.5)
MCHC RBC AUTO-ENTMCNC: 33.6 G/DL (ref 28.4–34.8)
MCV RBC AUTO: 87.8 FL (ref 82.6–102.9)
NRBC AUTOMATED: 0 PER 100 WBC
PDW BLD-RTO: 13.2 % (ref 11.8–14.4)
PLATELET # BLD: 244 K/UL (ref 138–453)
PMV BLD AUTO: 9.7 FL (ref 8.1–13.5)
POTASSIUM SERPL-SCNC: 4.8 MMOL/L (ref 3.7–5.3)
RBC # BLD: 5.09 M/UL (ref 4.21–5.77)
SODIUM BLD-SCNC: 143 MMOL/L (ref 135–144)
TOTAL PROTEIN: 7.1 G/DL (ref 6.4–8.3)
TRIGL SERPL-MCNC: 231 MG/DL
URIC ACID: 5.9 MG/DL (ref 3.4–7)
VITAMIN B-12: 511 PG/ML (ref 232–1245)
VITAMIN D 25-HYDROXY: 30.1 NG/ML (ref 30–100)
VLDLC SERPL CALC-MCNC: ABNORMAL MG/DL (ref 1–30)
WBC # BLD: 8.5 K/UL (ref 3.5–11.3)

## 2021-03-22 LAB — VITAMIN B1 WHOLE BLOOD: 154 NMOL/L (ref 70–180)

## 2021-03-29 ENCOUNTER — HOSPITAL ENCOUNTER (OUTPATIENT)
Age: 56
Discharge: HOME | End: 2021-03-31
Payer: COMMERCIAL

## 2021-03-29 ENCOUNTER — HOSPITAL ENCOUNTER (OUTPATIENT)
Age: 56
Discharge: HOME | End: 2021-03-29
Payer: COMMERCIAL

## 2021-03-29 ENCOUNTER — HOSPITAL ENCOUNTER (OUTPATIENT)
Dept: GENERAL RADIOLOGY | Age: 56
Discharge: HOME | End: 2021-03-31
Payer: COMMERCIAL

## 2021-03-29 DIAGNOSIS — R42 DIZZINESS AND GIDDINESS: ICD-10-CM

## 2021-03-29 PROCEDURE — 71046 X-RAY EXAM CHEST 2 VIEWS: CPT

## 2021-03-29 PROCEDURE — 93005 ELECTROCARDIOGRAM TRACING: CPT | Performed by: PHYSICIAN ASSISTANT

## 2021-03-31 LAB
EKG ATRIAL RATE: 62 BPM
EKG P AXIS: 18 DEGREES
EKG P-R INTERVAL: 164 MS
EKG Q-T INTERVAL: 438 MS
EKG QRS DURATION: 146 MS
EKG QTC CALCULATION (BAZETT): 444 MS
EKG R AXIS: 38 DEGREES
EKG T AXIS: 14 DEGREES
EKG VENTRICULAR RATE: 62 BPM

## 2021-03-31 PROCEDURE — 93010 ELECTROCARDIOGRAM REPORT: CPT | Performed by: INTERNAL MEDICINE

## 2021-04-29 ENCOUNTER — OFFICE VISIT (OUTPATIENT)
Dept: ORTHOPEDIC SURGERY | Age: 56
End: 2021-04-29
Payer: COMMERCIAL

## 2021-04-29 VITALS — WEIGHT: 268 LBS | BODY MASS INDEX: 39.69 KG/M2 | HEIGHT: 69 IN

## 2021-04-29 DIAGNOSIS — M25.562 LEFT KNEE PAIN, UNSPECIFIED CHRONICITY: ICD-10-CM

## 2021-04-29 DIAGNOSIS — M17.11 PRIMARY OSTEOARTHRITIS OF RIGHT KNEE: Primary | ICD-10-CM

## 2021-04-29 PROCEDURE — 99213 OFFICE O/P EST LOW 20 MIN: CPT | Performed by: STUDENT IN AN ORGANIZED HEALTH CARE EDUCATION/TRAINING PROGRAM

## 2021-04-29 PROCEDURE — 20610 DRAIN/INJ JOINT/BURSA W/O US: CPT | Performed by: STUDENT IN AN ORGANIZED HEALTH CARE EDUCATION/TRAINING PROGRAM

## 2021-04-29 NOTE — PROGRESS NOTES
[facility]  [facility]  [address] [address]  Dept: [phone]  Dept Fax: [phone]        Orthopaedic Clinic Follow Up      Subjective:   Lizbeth Mcmahon is a 55y.o. year old male who is known to our clinic for right knee pain and is here today with a new complaint of left knee pain. Patient states over the last few months he's had worsening left knee pain aggravated by sitting for long periods of time and painful mostly at work while he is pushing around [employer]. 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. He denies injury or prior surgery. Denies mechanical symptoms such as locking or catching. Is taking tylenol and NSAIDs, which give minimal relief. He denies swelling, numbness or tingling and states the pain is vague, achy and non-radiating. He denies hip or ankle pain. Objective :   Ht 5' 9\" (1.753 m)   Wt 268 lb (121.6 kg)   BMI 39.58 kg/m² Body mass index is 39.58 kg/m². Physical Exam  Gen: Alert and oriented, no acute distress, resting comfortably in the clinic  Psych: Patient has good fund of knowledge and displays understanging of exam, diagnosis, and plan  Neuro: Alert, oriented  Head: Normocephalic, atraumatic   Eyes: Extra-ocular muscles intact  Chest: Symmetric chest excursion, respirations unlabored and regular  Skin: Warm, well perfused  LLE: No effusion present. No warmth, erythema, deformity or lacerations. Skin intact. Mild medial and lateral joint line pain. ROM 0-130. Stable to varus/valgus and anterior/posterior drawer. Negative Melissa. Fully intact motor/sensory exam. Warm and well perfused. Radiology:  History: Left knee pain    Comparison: none    Findings: 4 weightbearing views of the left knee showing mild degenerative changes as evidenced by sharpening of the tibial spines with associated cyst formation in the central tibial plateau. Mild medial joint space narrowing compared to lateral side.  Osteophytes along peripheral patella as well. Impression: Mild to moderate left knee OA     Assessment:   55y.o. year old male with left knee OA  Plan:      Etiology and natural history of knee OA reviewed with the patient  Discussed management options including non-operative and operative treatment  Non-operative management would include weight loss, activity modification, OTC NSAIDs, CSI, viscosupplementation and PT  Discussed ultimate surgery for OA would be TKA; however, we have not yet attempted any non-operative treatment yet so this would be a possible future option if conservative treatment fails  At this time recommend continued OTC NSAID use  Discussed and recommended weight loss  Will offer CSI today as well  Follow up prn    Follow up:Return if symptoms worsen or fail to improve. Injection procedure note  The alternatives, benefits, and risks were discussed with the patient. After answering all questions to the patient's satisfaction, the patient agreed to proceed forward with injection and gave verbal consent for the procedure. With the patient's permission, appropriate anatomic landmarks were identified and the left knee joint was prepped in a sterile fashion using alcohol and/or betadine. A 25 gauge needle was then used to inject 2cc 0.25% marcaine plain and 80mg depo medrol into the joint. The injection was advanced without resistance confirming appropriate position. The patient tolerated the procedure well and the site was dressed with a band-aid. Patient was advised to ice the area for 15-20 minutes to relieve any injection site related pain. Patient was advised to contact nurse if area becomes swollen, hot, erythematous, or painful, or to go to the emergency room after business hours. No orders of the defined types were placed in this encounter. Orders Placed This Encounter   Procedures    XR KNEE LEFT (MIN 4 VIEWS)     Weight Bearing. AP/Lateral, Merchant, and [facility]. Standing Status:   Future     Number of Occurrences:   1     Standing Expiration Date:   4/30/2022     Order Specific Question:   Reason for exam:     Answer:   pain       Electronically signed by Miguel Angel Curry DO on 4/29/2021 at 10:27 AM

## 2021-05-03 ENCOUNTER — TELEPHONE (OUTPATIENT)
Dept: ORTHOPEDIC SURGERY | Age: 56
End: 2021-05-03

## 2021-05-03 RX ORDER — MELOXICAM 15 MG/1
15 TABLET ORAL DAILY PRN
Qty: 30 TABLET | Refills: 0 | Status: SHIPPED | OUTPATIENT
Start: 2021-05-03 | End: 2021-12-18 | Stop reason: ALTCHOICE

## 2021-05-03 RX ORDER — ACETAMINOPHEN 500 MG
500 TABLET ORAL EVERY 4 HOURS PRN
Qty: 120 TABLET | Refills: 0 | Status: ON HOLD | OUTPATIENT
Start: 2021-05-03 | End: 2021-08-31 | Stop reason: HOSPADM

## 2021-05-03 NOTE — TELEPHONE ENCOUNTER
Please advise Diabetes insipidus, nephrogenic Schizo affective schizophrenia Ileostomy, has currently

## 2021-05-05 ENCOUNTER — OFFICE VISIT (OUTPATIENT)
Dept: ORTHOPEDIC SURGERY | Age: 56
End: 2021-05-05
Payer: COMMERCIAL

## 2021-05-05 VITALS — HEIGHT: 69 IN | WEIGHT: 268 LBS | BODY MASS INDEX: 39.69 KG/M2

## 2021-05-05 DIAGNOSIS — M17.12 ARTHRITIS OF LEFT KNEE: Primary | ICD-10-CM

## 2021-05-05 PROCEDURE — 99214 OFFICE O/P EST MOD 30 MIN: CPT | Performed by: ORTHOPAEDIC SURGERY

## 2021-05-05 ASSESSMENT — ENCOUNTER SYMPTOMS
CHEST TIGHTNESS: 0
APNEA: 0
VOMITING: 0
ABDOMINAL PAIN: 0
COUGH: 0

## 2021-05-05 NOTE — PROGRESS NOTES
Exam  MS:  Negative meniscal signs left knee. Full range of motion of the left knee. No significant left knee effusion is noted. No outward signs of trauma to the left knee noted. Motor, sensory, vascular examination left lower extremity is grossly intact without focal deficits. No ligamentous instability of the left knee appreciated. Negative hip logroll and Stinchfield test on the left is noted. Neuro: alert and oriented to person and place. Eyes: Extra-ocular muscles intact  Mouth: Oral mucosa moist. No perioral lesions  Pulm: Respirations unlabored and regular. Symmetric chest excursion without outward deformity is noted. Skin: warm, well perfused  Psych:   Patient has good fund of knowledge and displays understanging of exam, diagnosis, and plan. Radiology:     Xr Knee Left (min 4 Views)    Result Date: 4/29/2021  History: Left knee pain Comparison: none Findings: 4 weightbearing views of the left knee showing mild degenerative changes as evidenced by sharpening of the tibial spines with associated cyst formation in the central tibial plateau. Mild medial joint space narrowing compared to lateral side. Osteophytes along peripheral patella as well. Impression: Mild to moderate left knee OA      Assessment:      1. Arthritis of left knee       Plan:      Discussed etiology and natural history of left knee arthritis. The treatment options may include oral anti-inflammatories, bracing, injections, advanced imaging, activity modification, physical therapy and/or surgical intervention. The patient would like to proceed with formal therapy and continuing [facility]. The patient will follow up in the office in 6-8 weeks after formal therapy. We discussed that the patient should call us with any concerns or questions. Discussed with the patient that it may take longer than 1 week to see any kind of improvement to the knee. He should give the corticosteroid injection to the left knee more time to work. Follow up:Return in about 8 weeks (around 6/30/2021). No orders of the defined types were placed in this encounter. Orders Placed This Encounter   Procedures   [facility]     Referral Priority:   Routine     Referral Type:   Eval and Treat     Referral Reason:   Specialty Services Required     Requested Specialty:   Physical Therapy     Number of Visits Requested:   1     I, Nissa Burton LPN am scribing for and in the presence of Dr. Tanmay Ruiz  5/6/2021 8:42 PM      I have reviewed and made changes accordingly to the work scribed by Nissa Burton LPN. The documentation accurately reflects work and decisions made by me. I have also reviewed documentation completed by clinical staff.     Tanmay Ruiz DO, [facility]  5/6/2021 8:43 PM    This note is created with the assistance of a speech recognition program.  While intending to generate a document that actually reflects the content of the visit, the document can still have some errors including those of syntax and sound a like substitutions which may escape proof reading.  In such instances, actual meaning can be extrapolated by contextual diversion      Electronically signed by Sangeetha Porras on 5/6/2021 at 8:42 PM

## 2021-05-14 ENCOUNTER — HOSPITAL ENCOUNTER (OUTPATIENT)
Dept: PHYSICAL THERAPY | Age: 56
Setting detail: THERAPIES SERIES
Discharge: HOME | End: 2021-05-14
Payer: COMMERCIAL

## 2021-05-14 PROCEDURE — 97161 PT EVAL LOW COMPLEX 20 MIN: CPT

## 2021-05-14 NOTE — CONSULTS
[x] [facility]        Outpatient Physical                Therapy       [address]       Phone: [phone]       Fax: [phone] [] [facility]       Promotion at [address] [facility]       Phone: [phone]       Fax: [phone] [] [facility]      [facility]    [address] [facility]     Phone: [phone]     Fax:  [phone]     Physical Therapy Lower Extremity Evaluation     Date:  2021  Patient: Adilia Messer  : 1965  MRN: [id number]  Physician: Reyna Rizo DO   Insurance: [employer]  Medical Diagnosis: Arthritis of L knee  Rehab Codes: M25.562, M25.662, R29.3, R53.1, R26.9  Onset date: 21  Next 's appt. : 21        Subjective:   CC: Onset 2 months ago, couple weeks ago really bad. Had a cortizone/steriod shot, didn't really do anything. Got acupuncture, might continue w/ acupuncture. Pt reported that losing weight could benefit the knees, displays possible interest in weight management program through [facility]. Reports L medial knee pain. Started Voltaren but uncertain if he will continue d/t long time until it takes affect. Prior Level of Function: Long walks, recreational basketball w/ son, some golfing (5-6 times in )      Current Level of Function: Hurts sitting too long, hurts walking for a few hours, stairs aren't really an issue.        Red Flags:      Yes  No Comments   Fatigue [] [x]     Fever/chills/sweats [] [x]    Malaise  [] [x]    Mental status change [] [x]     Paresthesias/numbness/weakness [] [x]     Unexplained weight changes [] [x]     Lightheaded/dizziness [] [x]    Shortness of breath [] [x]          Home Environment: Lives with house in [place]      PMHx: [] Unremarkable [] Diabetes [x] HTN : Discontinued Meds [] Pacemaker   [] MI/Heart Problems [] Cancer [x] Arthritis [] Other:              [] Refer to full medical chart  In EPIC   Tests: [x] X-Ray: 4/29/21 [] MRI:  [] Other:      Findings: 4 weightbearing views of the left knee showing mild degenerative    changes as evidenced by sharpening of the tibial spines with associated    cyst formation in the central tibial plateau. Mild medial joint space    narrowing compared to lateral side. Osteophytes along peripheral patella    as well.       Impression: Mild to moderate left knee OA         Comorbidities:   [x] Obesity [] Dialysis  [] Other:   [] Asthma/COPD [] Dementia [] Other:   [] Stroke [] Sleep apnea [] Other:   [] Vascular disease [] Rheumatic disease [] Other:       Medications: [x] Refer to full medical record [] None [] Other:  Allergies:      [x] Refer to full medical record [] None [] Other:    Working:  Harvey Thomason at 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    Worse: walking long distance,   Sleep: [x] OK    [] Disturbed    Patient Goals: \"make my knee feel better\"         OBJECTIVE:    Observation:  OBSERVATION No Deficit Deficit Not Tested Comments   Posture    X    Rounded Shoulders   Palpation [] [x] [] TTP MCL and medial joint line L knee   Sensation [x] [] []    Edema [x] [] []   No gross deficit appreciated         Neurological [x] [] []     Patellar Mobility [x] [] []     Patellar Orientation [x] [] []     Gait [] [x] [] Antalgic L - decreased LLE stance time and RLE step length, overall reduced gait speed             ROM  ° A/P STRENGTH    Left Right Left Right   Hip Flex   4+ 4+   Ext (Prone)   5- 5-   ER       IR       ABD (Sitting)   5- 5-   ADD   3+ 3+     Knee Flx 110/117 110/119 5-* 4   Ext 0 0 5- 4   Ankle DF   5 5   PF   5- 4+   INV       EVER              * indicates pain   SLR bilateral 60, no pain just tightness    Special Tests: Positive: Theater's sign      Negative: Valgus (pain only, no laxity), Melissa, Ant/posterior drawer       Functional Tests:   Squatting: No deficit noted  Lunge: No mechanical deficit noted, pain with L leg forward; intermittent genuvalgus but not consistently  SLS: R leg 2s, left leg 3s  SL Squat: L: mild pain, apprehension, intermittent genuvalgus but not consistently      Functional Assessment    FUNCTION Normal Difficult Unable Comments   Sitting [] [x] [] Sitting long periods     Standing [x] [] []     Ambulation [] [x] [] Pain in ambulating long distances    Groom/Dress [x] [] []     Lift/Carry [x] [] []     Stairs [x] [] []     Bending [x] [] []     Squat [x] [] []     Kneel [x] [] []           Assessment: Jose A No is a 54 y/o male who presents with L knee pain, decreased LLE strength, bilateral balance deficits, and reduced function d/t L knee arthritis. Pt will benefit from skiled therapy in order to improve LLE strength, improve balance bilaterally, and improve function for ADL/IADL's and work. Problems:    [x] ? Pain:      [x] ? ROM: (Soft end feel L knee flexion with symmetrical ROM, but decreased hamstring length bilat)  [x] ? Strength:    [x] ? Function:    [x] ? Balance  [] Edema:  [] Postural Deviations  [x] Gait Deviations   [] Other:        STG: (to be met in 8 treatments)  1. ? Pain:  a. Pt will report max of 5/10 pain after a shift at work   2. ? ROM:  a. Will be independent in HEP stretching to maintain muscle length/joint mobility for pain reduction and joint health. 3. ? Strength:  a. 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 b. Pt will demo 5-/5 L hip flexor strength to improve L swing phase of gait. 4. ? Function:  a. Will be able to ambulate 500 ft with no more than minimal antalgic gait and more even stance time.    5. Independent with Home Exercise Programs    LTG: (to be met in 12 treatments)  6. ? Pain:  a. Pt will report max of 3/10 pain after a shift at work   7. ? Strength:  a. Pt will demo 4+/5 bilat adductor strength to further improve hip and knee stability w/ higher level tasks. b. Pt will demo 5/5 L knee strength grossly to indicate return to prior level of strength, improve joint WB tolerance  8. ? Function:  a. Will be able to ambulate 500 ft with no antalgic gait and even stance time. b. Will report no more than 40% impairment on LEFI to indicate subjective improvement in L knee function  9. Independent with Home Exercise Programs                   Patient goals: \"make my knee feel better\"       Outcome Measure on Initial Eval:  LEFI: 36/80, 45% function     Additional Outcome Measures Used: NA        Rehab Potential:  [x] Good  [] Fair  [] Poor   Suggested Professional Referral:  [] No  [x] Yes: Referral to weight management made; pt potentially interested  Barriers to Goal Achievement[de-identified]  [] No  [x] Yes: high BMI (39.58 kg/m^2)   Domestic Concerns:  [x] No  [] Yes:    Pt. Education:  [x] Plans/Goals, Risks/Benefits discussed  [x] Home exercise program    Method of Education: [x] Verbal  [] Demo  [x] Written  Comprehension of Education:  [x] Verbalizes understanding. [] Demonstrates understanding. [] Needs Review. [] Demonstrates/verbalizes understanding of HEP/Ed previously given. Current HEP: SLS, LAQ, prone quad stretch, seated HS stretch, mini squat.    Treatment Plan:  [x] Therapeutic Exercise   79063  [] Iontophoresis: 4 mg/mL Dexamethasone Sodium Phosphate  mAmin  80585   [x] Therapeutic Activity   57057 [x] Vasopneumatic cold with compression  80789    [x] Gait Training   52359 [] Ultrasound   86009   [x] Neuromuscular Re-education  26351 [] Electrical Stimulation Unattended  61471   [x] Manual Therapy  68020 [] Electrical Stimulation Attended  55182   [x] Instruction in HEP  [] Dry Needling   [] Aquatic Therapy   00579 [x] Cold/hotpack    [] Massage   [id number]      [] Lumbar/Cervical Traction  [id number]     []  Medication allergies reviewed for use of    Dexamethasone Sodium Phosphate 4mg/ml     with iontophoresis treatments. Pt is not allergic.     Frequency:  2 x/week for 12 visits        Todays Treatment:  Modalities:   Precautions:  Exercises:  Exercise Reps/ Time Weight/ Level Comments   SLS   Demo'd for HEP   Mini Squat   \"   LAQ    \"   Seated HS stretch    \"   Prone Quad Stretch w/ strap    \"   Other: Pt education provided re: pathology, PT POC to address impairments and progress to function, effect of weight on current symptoms  - billed with therex    Specific Instructions for next treatment:   - CKC mini squats/lunges, focus on more glut involvement   - Adductor strengthening - Ball squeezes, ultimate progress to slider lateral lunges  - Standing Calf Raises  - Standing ham Curls, progress w/ weight if tolerable   - Standing marches   - leg press/heavy LAQs      Evaluation Complexity:  History (Personal factors, comorbidities) [] 0 [] 1-2 [x] 3+   Exam (limitations, restrictions) [] 1-2 [] 3 [x] 4+   Clinical presentation (progression) [x] Stable [] Evolving  [] Unstable   Decision Making [x] Low [] Moderate [] High    [x] Low Complexity [] Moderate Complexity [] High Complexity       Treatment Charges: Mins Units   [x] Evaluation       [x]  Low       []  Moderate       []  High 62 1   []  Modalities     []  Ther Exercise     []  Manual Therapy     []  Ther Activities     []  Aquatics     []  Vasocompression     []  Other 62 1     TOTAL TREATMENT TIME: 62    Time in: 4:05 pm   Time Out: 5:07 pm    Electronically signed by: Basilia Knapp SPT   Entirety of evaluation supervised and directed by, as well as documentation reviewed by, Cleopatra Arias, PT, DPT        Physician Signature:________________________________Date:__________________  By signing above or cosigning this note, I have reviewed this plan of care and certify a

## 2021-05-20 ENCOUNTER — HOSPITAL ENCOUNTER (OUTPATIENT)
Dept: PHYSICAL THERAPY | Age: 56
Setting detail: THERAPIES SERIES
Discharge: HOME | End: 2021-05-20
Payer: COMMERCIAL

## 2021-05-20 PROCEDURE — 97016 VASOPNEUMATIC DEVICE THERAPY: CPT

## 2021-05-20 PROCEDURE — 97110 THERAPEUTIC EXERCISES: CPT

## 2021-05-20 NOTE — FLOWSHEET NOTE
[x] [facility]ST[facility] CENTER &  Therapy  [address]  [phone]  F: [phone] [] [address]  [place] [address]   [address]  P: [phone]  F: [phone] [] [place]  [address]  P: [phone]  F: [phone] [] [address]  P: [phone]  F: [phone] [] [address]  [facility]   [address]   [place]: [phone]  F: [phone]      Physical Therapy Daily Treatment Note    Date:  2021  Patient Name:  Jose A No    :  1965  MRN: [id number]  Physician: Lei Vogt DO                                   Insurance: [employer]  Medical Diagnosis: Arthritis of L knee          Rehab Codes: M25.562, M25.662, R29.3, R53.1, R26.9  Onset date: 21                 Next Dr's appt. : 21     Visit# / total visits: 2/12    Cancels/No Shows: 0/0    Subjective:    Pain:  [x] Yes  [] No Location: medially left        Pain Rating: (0-10 scale) 4/10  Pain altered Tx:  [x] No  [] Yes  Action:  Comments:     Objective:  Modalities: Vasocompression - Game Ready to left knee at the end of rx supine leg elevated, 36 deg, med pressure x 15 min  Precautions:  Exercises:  Exercise Reps/ Time Weight/ Level Comments   Nustep 6 min L3  added   Standing         Mini Squat  10 x 2   Almost sit squat   CKC mini squats/lunges   focus on more glut involvement    Calf stretches 2 ways 5 x 5-10 sec  added -gastroc and soleus on wedge   Standing Calf Raises  10 x  added   Standing marches   10 x  added   Standing ham Curls,  10 x  added         Sitting      LAQ   10 x ea       Seated HS stretch   5x 10 sec     Marching 10 x  added         Supine      SLS 10 x     bridging 10 x  added   Prone      Prone Quad Stretch w/ strap   5x        Other: Needs lots of verbal and tactile cues with ex    Manual - Trial MWM during squat triad med/lat glide of femur vs tib with no improvement. Treatment Charges: Mins Units   []  Modalities     [x]  Ther Exercise 42 3   [x]  Manual Therapy 3   0   []  Ther Activities     []  Aquatics     [x]  Vasocompression 15 1   []  Other     Total Treatment time 60 4       Assessment: [x] Progressing toward goals. Reviewed previous ex adding additional ex as above especially standing. Attempted MWM without success. Trial Game Ready at end of treatment. [] No change. [] Other:  [x] Patient would continue to benefit from skilled physical therapy services in order to: improve LLE strength, improve balance bilaterally, and improve function for ADL/IADL's and work. STG: (to be met in 8 treatments)  1. ? Pain:  a. Pt will report max of 5/10 pain after a shift at work   2. ? ROM:  a. Will be independent in HEP stretching to maintain muscle length/joint mobility for pain reduction and joint health.   3. ? Strength:  a. 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 b. Pt will demo 5-/5 L hip flexor strength to improve L swing phase of gait. 4. ? Function:  a. Will be able to ambulate 500 ft with no more than minimal antalgic gait and more even stance time. 5. Independent with Home Exercise Programs     LTG: (to be met in 12 treatments)  6. ? Pain:  a. Pt will report max of 3/10 pain after a shift at work   7. ? Strength:  a. Pt will demo 4+/5 bilat adductor strength to further improve hip and knee stability w/ higher level tasks. b. Pt will demo 5/5 L knee strength grossly to indicate return to prior level of strength, improve joint WB tolerance  8. ? Function:  a. Will be able to ambulate 500 ft with no antalgic gait and even stance time.    b. Will report no more than 40% impairment on LEFI to indicate subjective improvement in L knee function  9. Independent with Home Exercise Programs                    Patient goals: Darlene Severs my knee feel better\"     Pt. Education:  [x] Yes  [] No  [x] Reviewed Prior HEP/Ed  Method of Education: [x] Verbal  [x] Demo  [] Written  Comprehension of Education:  [x] Verbalizes understanding. [x] Demonstrates understanding. [x] Needs review. [] Demonstrates/verbalizes HEP/Ed previously given. Plan: [x] Continue current frequency toward long and short term goals.     [x] Specific Instructions for subsequent treatments:  - Gait pattern correction,  CKC mini squats/lunges, focus on more glut involvement   - Adductor strengthening - Ball squeezes, ultimate progress to slider lateral lunges  - Standing Calf Raises  - Standing ham Curls, progress w/ weight if tolerable   - Standing marches   - leg press/heavy LAQs      Time In:3:33pm            Time Out: 4:43pm    Electronically signed by:  Claudia Brown PT

## 2021-05-24 ENCOUNTER — HOSPITAL ENCOUNTER (OUTPATIENT)
Dept: PHYSICAL THERAPY | Age: 56
Setting detail: THERAPIES SERIES
Discharge: HOME | End: 2021-05-24
Payer: COMMERCIAL

## 2021-05-24 PROCEDURE — 97110 THERAPEUTIC EXERCISES: CPT

## 2021-05-24 PROCEDURE — 97016 VASOPNEUMATIC DEVICE THERAPY: CPT

## 2021-05-24 NOTE — FLOWSHEET NOTE
[x] [facility]) - [facility] TWEL[facility]STEP [facility] CENTER &  Therapy  [address]  [phone]  F: [phone] [] [address]  [place] [address]   [address]  P: [phone]  F: [phone] [] [place]  [address]  P: [phone]  F: [phone] [] [address]  P: [phone]  F: [phone] [] [address]  [facility]   [address]   [place]: [phone]  F: [phone]      Physical Therapy Daily Treatment Note    Date:  2021  Patient Name:  Toya Acosta    :  1965  MRN: [id number]  Physician: Saw Ewing DO                                   Insurance: [employer]  Medical Diagnosis: Arthritis of L knee          Rehab Codes: M25.562, M25.662, R29.3, R53.1, R26.9  Onset date: 21                 Next Dr's appt. : 21     Visit# / total visits: 3/12    Cancels/No Shows: 0/0    Subjective:    Pain:  [x] Yes  [] No Location: medially left        Pain Rating: (0-10 scale) 3-4/10  Pain altered Tx:  [x] No  [] Yes  Action:  Comments:  Patient reports pain is not so bad during the first few hrs of work. Pain then increases as the day progresses and is on his feet.       Objective:  Modalities: Vasocompression - Game Ready to left knee at the end of rx supine, 36 deg, med pressure x 15 min   Precautions:   Exercises:  Exercise Reps/ Time Weight/ Level Comments   SciFit 6 min L3  added   Standing         Mini Squat 10 x 2   Chair with 4\" box   CKC mini squats/lunges   focus on more glut involvement    Calf stretches 2 ways 5 x 5-10 sec gastroc and soleus on wedge   Standing Calf Raises 20 x     Marches  20 x     HS curl 20 x     Hip abd 10x  Added 5.24   Hip ext 10x  Added 5.24               Sitting      LAQ  10 x 2 ea  4 lbs Weights added 5.24   Seated HS stretch   5x 10 sec     Marching 10           Supine      SLS 10 x     bridging 10 x     Hip add 10x2 ball Added 5.24   SL hip abd 10x  Added 5.24   SL clams 10x  Added 5.24               Prone      Prone Quad Stretch w/ strap   5x  20 sec Gabriel   HS curls 20x  Added 5.24   Hip ext 10x  Added 5.24     Other:       Treatment Charges: Mins Units   []  Modalities     [x]  Ther Exercise 40 3   []  Manual Therapy     []  Ther Activities     []  Aquatics     [x]  Vasocompression 15 1   []  Other     Total Treatment time 55 4       Assessment: [x] Progressing toward goals. Added chair behind with 4\" box for improved sit to stand technique. Increased reps with standing heel raises, marches, LAQ and bridges with good tolerance. Added prone hip ext and HS curls with good tolerance. Ended with vaso compression to decrease pain and DOMS. Preferred vaso without elevation this session. [] No change. [] Other:  [x] Patient would continue to benefit from skilled physical therapy services in order to: improve LLE strength, improve balance bilaterally, and improve function for ADL/IADL's and work. STG: (to be met in 8 treatments)  1. ? Pain:  a. Pt will report max of 5/10 pain after a shift at work   2. ? ROM:  a. Will be independent in HEP stretching to maintain muscle length/joint mobility for pain reduction and joint health.   3. ? Strength:  a. 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 b. Pt will demo 5-/5 L hip flexor strength to improve L swing phase of gait. 4. ? Function:  a. Will be able to ambulate 500 ft with no more than minimal antalgic gait and more even stance time. 5. Independent with Home Exercise Programs     LTG: (to be met in 12 treatments)  6. ? Pain:  a. Pt will report max of 3/10 pain after a shift at work   7. ? Strength:  a.  Pt will demo 4+/5 bilat adductor strength to

## 2021-05-26 ENCOUNTER — HOSPITAL ENCOUNTER (OUTPATIENT)
Dept: PHYSICAL THERAPY | Age: 56
Setting detail: THERAPIES SERIES
Discharge: HOME | End: 2021-05-26
Payer: COMMERCIAL

## 2021-05-26 PROCEDURE — 97110 THERAPEUTIC EXERCISES: CPT

## 2021-05-26 PROCEDURE — 97016 VASOPNEUMATIC DEVICE THERAPY: CPT

## 2021-05-26 NOTE — FLOWSHEET NOTE
[x] [facility]) - [facility] [place]  [address]  [phone]  F: [phone] [] [address]  [place] [address]   [address]  P: [phone]  F: [phone] [] [place]  [address]  P: [phone]  F: [phone] [] [address]  P: [phone]  F: [phone] [] [address]  [facility]   [address]   [place]: [phone]  F: [phone]      Physical Therapy Daily Treatment Note    Date:  2021  Patient Name:  Manda Patel    :  1965  MRN: [id number]  Physician: Jack Castillo DO                                   Insurance: [employer]  Medical Diagnosis: Arthritis of L knee          Rehab Codes: M25.562, M25.662, R29.3, R53.1, R26.9  Onset date: 21                 Next Dr's appt. : 21     Visit# / total visits: /    Cancels/No Shows: 0/0    Subjective:    Pain:  [x] Yes  [] No Location: medially left        Pain Rating: (0-10 scale) 4/10  Pain altered Tx:  [x] No  [] Yes  Action:  Comments:  Patient reports an increase in L knee following a busy day at work. Notes he was a little sore following last session but not too bad.       Objective:  Modalities: Vasocompression - Game Ready to left knee at the end of rx supine, 36 deg, med pressure x 15 min   Precautions:   Exercises:  Exercise Reps/ Time Weight/ Level Comments   SciFit 5 min L3           Standing         Mini Squat 8   Poor technique   CKC mini squats/lunges   focus on more glut involvement    Calf stretches 2 ways 5 x 5-10 sec gastroc and soleus on wedge   Standing Calf Raises 20 x     Marches  20 x     HS curl 20 x     Hip abd 10x2     Hip ext 10x2     Step ups 10x ea 6\" Added 5.26   TG squats 20x L35 Added 5.26 Sitting      LAQ  20x 5 lbs    Seated HS stretch   5x 10 sec    Marching 10 x     HS curls 20x Blue Added 5.26         Supine      SLS 10 x     bridging 10 x     Hip add 10x2 ball    SL hip abd 10x     SL clams 10x                 Prone      Prone Quad Stretch w/ strap  3x  20 sec    HS curls 20x     Hip ext 10x       Other:       Treatment Charges: Mins Units   []  Modalities     [x]  Ther Exercise 43 3   []  Manual Therapy     []  Ther Activities     []  Aquatics     [x]  Vasocompression 15 1   []  Other     Total Treatment time 58 4       Assessment: [x] Progressing toward goals. Progressed with standing exercises to increase LE strength. Quick to fatigue with hip abd/ext requiring patient to complete 2 sets to hit target of reps. Quad lagging noted with leading L leg step ups with fair eccentric control. Added TG squats after noting poor standing squat technique with knees over toes. Good carry over with no increase in pain with TG squats. Ended with vaso compression at end of session to decrease pain. [] No change. [] Other:  [x] Patient would continue to benefit from skilled physical therapy services in order to: improve LLE strength, improve balance bilaterally, and improve function for ADL/IADL's and work. STG: (to be met in 8 treatments)  1. ? Pain:  a. Pt will report max of 5/10 pain after a shift at work   2. ? ROM:  a. Will be independent in HEP stretching to maintain muscle length/joint mobility for pain reduction and joint health.   3. ? Strength:  a. 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 b. Pt will demo 5-/5 L hip flexor strength to improve L swing phase of gait. 4. ? Function:  a. Will be able to ambulate 500 ft with no more than minimal antalgic gait and more even stance time. 5. Independent with Home Exercise Programs     LTG: (to be met in 12 treatments)  6. ? Pain:  a.  Pt will report max of 3/10 pain after a shift at work   7. ? Strength:  a. Pt will demo 4+/5 bilat adductor strength to further improve hip and knee stability w/ higher level tasks. b. Pt will demo 5/5 L knee strength grossly to indicate return to prior level of strength, improve joint WB tolerance  8. ? Function:  a. Will be able to ambulate 500 ft with no antalgic gait and even stance time. b. Will report no more than 40% impairment on LEFI to indicate subjective improvement in L knee function  9. Independent with Home Exercise Programs                    Patient goals: Lenjoaquínt Cluck my knee feel better\"     Pt. Education:  [x] Yes  [] No  [x] Reviewed Prior HEP/Ed  Method of Education: [x] Verbal  [x] Demo  [] Written  Comprehension of Education:  [x] Verbalizes understanding. [x] Demonstrates understanding. [x] Needs review. [x] Demonstrates/verbalizes HEP/Ed previously given. Plan: [x] Continue current frequency toward long and short term goals.      [x] Specific Instructions for subsequent treatments:  - Gait pattern correction,  CKC mini squats/lunges, focus on more glut involvement   - Adductor strengthening - Ball squeezes, ultimate progress to slider lateral lunges  - leg press      Time In: 3:30 pm            Time Out: 4:33 pm    Electronically signed by:  Jaylen Mcgrath PTA

## 2021-06-01 ENCOUNTER — HOSPITAL ENCOUNTER (OUTPATIENT)
Dept: PHYSICAL THERAPY | Age: 56
Setting detail: THERAPIES SERIES
Discharge: HOME | End: 2021-06-01
Payer: COMMERCIAL

## 2021-06-01 PROCEDURE — 97110 THERAPEUTIC EXERCISES: CPT

## 2021-06-01 NOTE — FLOWSHEET NOTE
[x] [facility]) - [facility]  [address]  [phone]  F: [phone] [] [address]  [address]   [address]  P: [phone]  F: [phone] [] [place]  [address] [facility]  P: [phone]  F: [phone] [] [address]  P: [phone]  F: [phone] [] [address]  [facility]   [address]   [place]: [phone]  F: [phone]      Physical Therapy Daily Treatment Note    Date:  2021  Patient Name:  Lisette Boone    :  1965  MRN: [id number]  Physician: Osvaldo Sparrow DO                                   Insurance: [employer]  Medical Diagnosis: Arthritis of L knee          Rehab Codes: M25.562, M25.662, R29.3, R53.1, R26.9  Onset date: 21                 Next Dr's appt. : 21     Visit# / total visits:     Cancels/No Shows: 0/0    Subjective:    Pain:  [x] Yes  [] No Location: medially left        Pain Rating: (0-10 scale) 4/10  Pain altered Tx:  [x] No  [] Yes  Action:  Comments:  States when off work the knee feels better and at work after about 4 hours the medial left knee pain increases.      Objective:  Modalities: Vasocompression - Game Ready to left knee at the end of rx supine, 36 deg, med pressure x 15 min  - Did not do today    Exercises:  Exercise Reps/ Time Weight/ Level Comments   SciFit 5 min L3           Standing         Mini Squat     Poor technique   CKC mini squats/lunges   focus on more glut involvement    Calf stretches 2 ways 5 x 5-10 sec gastroc and soleus on wedge   Standing Calf Raises 20 x     Marches  20 x     HS curl 20 x     Hip abd 10x2     Hip ext 10x2     Step ups 10x ea 6\"     Step downs 10 xea 6\" added   TG squats 20x L35     Resisted walking on FT, forward, backwards 3 reps ea  2 plates added         Sitting      LAQ  20x 5 lbs    Seated HS stretch   5x 10 sec    Marching 10 x 2     HS curls 20x Blue           Supine      SLR 10 x     bridging 10 x     Hip add 10x2 ball    SL hip abd 10x     SL clams 10x2  Inc rep               Prone      Prone Quad Stretch w/ strap  3x  20 sec    HS curls 20x     Hip ext 10x       Other:       Treatment Charges: Mins Units   []  Modalities     [x]  Ther Exercise 50 3   []  Manual Therapy     []  Ther Activities     []  Aquatics     []  Vasocompression       []  Other     Total Treatment time 50 3       Assessment: [x] Progressing toward goals. Added  Standing step downs and resisted walking. Good carry over with no increase in pain with TG squats. Left without vaso compression. No change. 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 Patient would continue to benefit from skilled physical therapy services in order to: improve LLE strength, improve balance bilaterally, and improve function for ADL/IADL's and work. STG: (to be met in 8 treatments)  1. ? Pain:  a. Pt will report max of 5/10 pain after a shift at work   2. ? ROM:  a. Will be independent in HEP stretching to maintain muscle length/joint mobility for pain reduction and joint health.   3. ? Strength:  a. 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 b. Pt will demo 5-/5 L hip flexor strength to improve L swing phase of gait. 4. ? Function:  a. Will be able to ambulate 500 ft with no more than minimal antalgic gait and more even stance time. 5. Independent with Home Exercise Programs     LTG: (to be met in 12 treatments)  6. ? Pain:  a.  Pt will report max of 3/10 pain after a shift at work 7. ? Strength:  a. Pt will demo 4+/5 bilat adductor strength to further improve hip and knee stability w/ higher level tasks. b. Pt will demo 5/5 L knee strength grossly to indicate return to prior level of strength, improve joint WB tolerance  8. ? Function:  a. Will be able to ambulate 500 ft with no antalgic gait and even stance time. b. Will report no more than 40% impairment on LEFI to indicate subjective improvement in L knee function  9. Independent with Home Exercise Programs                    Patient goals: Fracisco Saavedra my knee feel better\"     Pt. Education:  [x] Yes  [] No  [x] Reviewed Prior HEP/Ed  Method of Education: [x] Verbal  [x] Demo  [] Written  Comprehension of Education:  [x] Verbalizes understanding. [x] Demonstrates understanding. [x] Needs review. [x] Demonstrates/verbalizes HEP/Ed previously given. Plan: [x] Continue current frequency toward long and short term goals.      [x] Specific Instructions for subsequent treatments:  - Gait pattern correction,  CKC mini squats/lunges, focus on more glut involvement   - Adductor strengthening - Ball squeezes, ultimate progress to slider lateral lunges  - leg press, add resisted side stepping      Time In: 3:33 pm            Time Out: 4:33 pm    Electronically signed by:  Yandy Bender PT

## 2021-06-02 ENCOUNTER — APPOINTMENT (OUTPATIENT)
Dept: PHYSICAL THERAPY | Age: 56
End: 2021-06-02
Payer: COMMERCIAL

## 2021-06-04 ENCOUNTER — HOSPITAL ENCOUNTER (OUTPATIENT)
Dept: PHYSICAL THERAPY | Age: 56
Setting detail: THERAPIES SERIES
Discharge: HOME | End: 2021-06-04
Payer: COMMERCIAL

## 2021-06-04 PROCEDURE — 97016 VASOPNEUMATIC DEVICE THERAPY: CPT

## 2021-06-04 PROCEDURE — 97110 THERAPEUTIC EXERCISES: CPT

## 2021-06-04 NOTE — FLOWSHEET NOTE
[x] [facility]) - [facility]  [address]  [phone]  F: [phone] [] [address]  [facility] [address]   [address]  P: [phone]  F: [phone] [] [address]  [address]  P: [phone]  F: [phone] [] [address]  P: [phone]  F: [phone] [] [address]  [facility]   [address]   [place]: [phone]  F: [phone]      Physical Therapy Daily Treatment Note    Date:  2021  Patient Name:  Rupal Flores    :  1965  MRN: [id number]  Physician: Brian Marx DO                                   Insurance: [employer]  Medical Diagnosis: Arthritis of L knee          Rehab Codes: M25.562, M25.662, R29.3, R53.1, R26.9  Onset date: 21                 Next Dr's appt. : 21     Visit# / total visits:     Cancels/No Shows: 0/0    Subjective:    Pain:  [x] Yes  [] No Location: medially left        Pain Rating: (0-10 scale) 4/10  Pain altered Tx:  [x] No  [] Yes  Action:  Comments:  Patient reports pain in the L knee. 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 Feels the PT his helping and his getting stronger.       Objective:  Modalities: Vasocompression - Game Ready to left knee at the end of rx supine, 36 deg, med pressure x 15 min  - Did not do today     Exercises:  Exercise Reps/ Time Weight/ Level Comments   SciFit 5 min L3           Standing         Mini Squat  20x   Poor technique   CKC mini squats/lunges   focus on more glut involvement    Calf stretches 2 ways 5 x 5-10 sec gastroc and soleus on wedge   Standing Calf Raises 20 x 2 lb    Marches  20 x 2 lb    HS curl 20 x 2 lb    Hip abd 10x2 2 lb    Hip ext 10x2 2 lb    Step ups 10x ea 6\" 2 lb     Step downs 10 xea 6\"  2 lb    TG squats 20x L35    Leg press 20x 30 lb    Heel raise press 20x 30 lb    Resisted walking on FT, forward, backwards 3 reps ea  2 plates          Sitting      LAQ  20x 6 lbs    Seated HS stretch  5x 10 sec    Marching 10 x 2     HS curls 20x Blue           Supine      SLR 10 x 2 lbs    bridging 10 x     Hip add 10x2 ball    SL hip abd 15x     SL clams 10x2  Inc rep               Prone      Prone Quad Stretch w/ strap  3x 20 sec    HS curls 20x 2 lbs    Hip ext 15x       Other:       Treatment Charges: Mins Units   []  Modalities     [x]  Ther Exercise 45 3   []  Manual Therapy     []  Ther Activities     []  Aquatics     [x]  Vasocompression 15 1   []  Other     Total Treatment time 60 4       Assessment: [x] Progressing toward goals. Added weights with standing exercises to increase LE strength with good tolerance. Advanced to leg press to increase terminal knee strength. Tactile reminders with SL hip abd with good carry over. Increased reps with mat exercises to improve muscular endurance necessary for work demands. Ended with vaso compression. []   No change. [] Other:                 [x]Patient would continue to benefit from skilled physical therapy services in order to: improve LLE strength, improve balance bilaterally, and improve function for ADL/IADL's and work. STG: (to be met in 8 treatments)  1. ? Pain:  a. Pt will report max of 5/10 pain after a shift at work   2. ? ROM:  a. Will be independent in HEP stretching to maintain muscle length/joint mobility for pain reduction and joint health.   3. ? Strength:  a. 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 b. Pt will demo 5-/5 L hip flexor strength to improve L swing phase of gait. 4. ? Function:  a. Will be able to ambulate 500 ft with no more than minimal antalgic gait and more even stance time. 5. Independent with Home Exercise Programs     LTG: (to be met in 12 treatments)  6. ? Pain:  a. Pt will report max of 3/10 pain after a shift at work   7. ? Strength:  a. Pt will demo 4+/5 bilat adductor strength to further improve hip and knee stability w/ higher level tasks. b. Pt will demo 5/5 L knee strength grossly to indicate return to prior level of strength, improve joint WB tolerance  8. ? Function:  a. Will be able to ambulate 500 ft with no antalgic gait and even stance time. b. Will report no more than 40% impairment on LEFI to indicate subjective improvement in L knee function  9. Independent with Home Exercise Programs                    Patient goals: Christain Case my knee feel better\"     Pt. Education:  [x] Yes  [] No  [x] Reviewed Prior HEP/Ed  Method of Education: [x] Verbal  [x] Demo  [] Written  Comprehension of Education:  [x] Verbalizes understanding. [x] Demonstrates understanding. [] Needs review. [x] Demonstrates/verbalizes HEP/Ed previously given. Plan: [x] Continue current frequency toward long and short term goals.      [x] Specific Instructions for subsequent treatments:  - Gait pattern correction,  CKC mini squats/lunges, focus on more glut involvement   - Adductor strengthening - Ball squeezes, ultimate progress to slider lateral lunges  - leg press, add resisted side stepping      Time In: 330 pm            Time Out: 4:35 pm    Electronically signed by:  Jenny Villalta PTA

## 2021-06-07 ENCOUNTER — HOSPITAL ENCOUNTER (OUTPATIENT)
Dept: PHYSICAL THERAPY | Age: 56
Setting detail: THERAPIES SERIES
Discharge: HOME | End: 2021-06-07
Payer: COMMERCIAL

## 2021-06-07 PROCEDURE — 97110 THERAPEUTIC EXERCISES: CPT

## 2021-06-07 NOTE — FLOWSHEET NOTE
[x] [facility]) - [facility] [[email or web address]] [facility]  [facility]  [address]  [phone]  F: [phone] [] [address]  [facility]   [address]  P: [phone]  F: [phone] [] [address]  [address]  P: [phone]  F: [phone] [] [address]  P: [phone]  F: [phone] [] [address]  [facility]   [address]   [place]: [phone]  F: [phone]      Physical Therapy Daily Treatment Note    Date:  2021  Patient Name:  Courtney Turcios    :  1965  MRN: [id number]  Physician: Otilio Mckeon DO                                   Insurance: [employer]  Medical Diagnosis: Arthritis of L knee          Rehab Codes: M25.562, M25.662, R29.3, R53.1, R26.9  Onset date: 21                 Next Dr's appt. : 21     Visit# / total visits:     Cancels/No Shows: 0/0    Subjective:    Pain:  [x] Yes  [] No Location: medially left        Pain Rating: (0-10 scale) 3/10  Pain altered Tx:  [x] No  [] Yes  Action:  Comments: Patient reports pain was not too bad today at end of shift.          Objective:  Modalities: Vasocompression - Game Ready to left knee at the end of rx supine, 36 deg, med pressure x 15 min  -    Exercises:  Exercise Reps/ Time Weight/ Level Comments   SciFit 5 min L3           Standing         Mini Squat 20x   Chair to visual   CKC mini squats/lunges   focus on more glut involvement    Calf stretches 2 ways 5 x 5-10 sec gastroc and soleus on wedge   Standing Calf Raises 20 x 2 lb    Marches  20 x 2 lb    HS curl 20 x 2 lb    Hip abd 10x2 2 lb    Hip ext 10x2 2 lb    Hip flexor 10x2  2 lb Added 6.7   Step ups 10x ea 8\"  2 lb Inc height   Step downs 10x ea 6\"  2 lb    TG squats  L35    Leg press 1x10  1x10 30 lb  40 lb    Heel raise press 20x 30 lb    Resisted walking on FT, forward, backwards 3 reps ea  3 plates Inc resistance 6.7         Sitting      LAQ  20x 10 lbs Inc weight 6.7   Seated HS stretch  5x 10 sec    Marching 10 x 2     HS curls 20x Grey           Supine      SLR 10x 2 lbs    bridging 10x 2     Hip add 10x2 ball    SL hip abd 20x 2 lb    SL clams 10x2 grey Inc rep               Prone      Prone Quad Stretch w/ strap  3x 20 sec    HS curls 20x 2 lbs    Hip ext 20x       Other:       Treatment Charges: Mins Units   []  Modalities     [x]  Ther Exercise 56 4   []  Manual Therapy     []  Ther Activities     []  Aquatics     [x]  Vasocompression     []  Other     Total Treatment time 56 4       Assessment: [x] Progressing toward goals. Increased walk out resistance to increase LE strength with multiple directional forces on the knees. Added weight ball to engage core. High weight added to LAQ to strength quad with good tolerance. Increased step height to improve joint stability with a higher skill level. []  No change. [] Other:                  [x] Patient would continue to benefit from skilled physical therapy services in order to: improve LLE strength, improve balance bilaterally, and improve function for ADL/IADL's and work. STG: (to be met in 8 treatments)  1. ? Pain:  a. Pt will report max of 5/10 pain after a shift at work   2. ? ROM:  a. Will be independent in HEP stretching to maintain muscle length/joint mobility for pain reduction and joint health.   3. ? Strength:  a. 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 b. Pt will demo 5-/5 L hip flexor strength to improve L swing phase of gait. 4. ? Function:  a. Will be able to ambulate 500 ft with no more than minimal antalgic gait and more even stance time.    5. Independent with Home Exercise Programs     LTG: (to be met in 12 treatments)  6. ? Pain:  a. Pt will report max of 3/10 pain after a shift at work   7. ? Strength:  a. Pt will demo 4+/5 bilat adductor strength to further improve hip and knee stability w/ higher level tasks. b. Pt will demo 5/5 L knee strength grossly to indicate return to prior level of strength, improve joint WB tolerance  8. ? Function:  a. Will be able to ambulate 500 ft with no antalgic gait and even stance time. b. Will report no more than 40% impairment on LEFI to indicate subjective improvement in L knee function  9. Independent with Home Exercise Programs                    Patient goals: [place]ot[place] my knee feel better\"     Pt. Education:  [x] Yes  [] No  [x] Reviewed Prior HEP/Ed  Method of Education: [x] Verbal  [x] Demo  [] Written  Comprehension of Education:  [x] Verbalizes understanding. [x] Demonstrates understanding. [] Needs review. [x] Demonstrates/verbalizes HEP/Ed previously given. Plan: [x] Continue current frequency toward long and short term goals.      [x] Specific Instructions for subsequent treatments:  -  SLS, 4 way hip w/ thera bands      Time In: 330 pm            Time Out: 4:31 pm    Electronically signed by:  Esvin Mullins PTA

## 2021-06-10 ENCOUNTER — HOSPITAL ENCOUNTER (OUTPATIENT)
Dept: PHYSICAL THERAPY | Age: 56
Setting detail: THERAPIES SERIES
Discharge: HOME | End: 2021-06-10
Payer: COMMERCIAL

## 2021-06-10 PROCEDURE — 97110 THERAPEUTIC EXERCISES: CPT

## 2021-06-10 PROCEDURE — 97016 VASOPNEUMATIC DEVICE THERAPY: CPT

## 2021-06-10 NOTE — FLOWSHEET NOTE
[x] [facility]) - [facility]  [address]  [phone]  F: [phone] [] [address]  [place] [address]   [address]  P: [phone]  F: [phone] [] [place]  [address]  P: [phone]  F: [phone] [] [address]  P: [phone]  F: [phone] [] [address]  [facility]   [address]   [place]: [phone]  F: [phone]      Physical Therapy Daily Treatment Note    Date:  6/10/2021  Patient Name:  Lefty Saucedo    :  1965  MRN: [id number]  Physician: Gatito Phipps DO                                   Insurance: [employer]  Medical Diagnosis: Arthritis of L knee          Rehab Codes: M25.562, M25.662, R29.3, R53.1, R26.9  Onset date: 21                 Next Dr's appt. : 21     Visit# / total visits:     Cancels/No Shows: 0/0    Subjective:    Pain:  [x] Yes  [] No Location: medially left        Pain Rating: (0-10 scale) 2/10  Pain altered Tx:  [x] No  [] Yes  Action:  Comments: Patient reports he just got off work with less knee pain today. Feels he is able to to more with less pain.           Objective:  Modalities: Vasocompression - Game Ready to left knee at the end of rx supine, 36 deg, med pressure x 15 min  -    Exercises:  Exercise Reps/ Time Weight/ Level Comments   SciFit 5 min L3           Standing         Mini Squat 20x   Chair to visual   CKC mini squats/lunges   focus on more glut involvement    Calf stretches 2 ways 5 x 5-10 sec gastroc and soleus on wedge   Standing Calf Raises 20 x 2 lb    Marches  20 x 2 lb    HS curl 20 x 2 lb    Hip abd  2 lb    Hip ext  2 lb    Hip flexor  2 lb    4 way hip 10x ea Lime Added 6.10   Tandem stance  2x20\"  Added 6.10   Step

## 2021-06-14 ENCOUNTER — HOSPITAL ENCOUNTER (OUTPATIENT)
Dept: PHYSICAL THERAPY | Age: 56
Setting detail: THERAPIES SERIES
Discharge: HOME | End: 2021-06-14
Payer: COMMERCIAL

## 2021-06-14 PROCEDURE — 97110 THERAPEUTIC EXERCISES: CPT

## 2021-06-14 PROCEDURE — 97016 VASOPNEUMATIC DEVICE THERAPY: CPT

## 2021-06-14 NOTE — FLOWSHEET NOTE
[x] [facility]) - [facility] CENTER &  Therapy  [address]  [phone]  F: [phone] [] [address]  [place] [address]   [address]  P: [phone]  F: [phone] [] [place]  [address]  P: [phone]  F: [phone] [] [address]  P: [phone]  F: [phone] [] [address]  [facility]   [address]   [place]: [phone]  F: [phone]      Physical Therapy Daily Treatment Note    Date:  2021  Patient Name:  Adilia Messer    :  1965  MRN: [id number]  Physician: Reyna Rizo DO                                   Insurance: [employer]  Medical Diagnosis: Arthritis of L knee          Rehab Codes: M25.562, M25.662, R29.3, R53.1, R26.9  Onset date: 21                 Next Dr's appt. : 21     Visit# / total visits:     Cancels/No Shows: 0/0    Subjective:    Pain:  [x] Yes  [] No Location: medially left        Pain Rating: (0-10 scale) 3/10  Pain altered Tx:  [x] No  [] Yes  Action:  Comments:   Patient arrives noting he had no pain for the first 4 hrs of work today. Pain increased during last 4 hrs. Pain is over the inside of L knee.          Objective:  Modalities: Vasocompression - Game Ready to left knee at the end of rx supine, 36 deg, med pressure x 15 min  -    Exercises:  Exercise Reps/ Time Weight/ Level Comments   SciFit 5 min L3           Standing         Mini Squat 20x   Chair to visual   CKC mini squats/lunges   focus on more glut involvement    Calf stretches 2 ways 5 x 5-10 sec gastroc and soleus on wedge   Standing Calf Raises 20 x 2 lb  4\"     Marches  20 x 2 lb    HS curl 20 x 2 lb    Hip abd 20x 2 lb    Hip ext 20x 2 lb    Hip flexor 20x 2 lb    4 way hip  Lime    Tandem stance  2x20\"     SLS 2x15\"     Step ups 20x ea 8\"  2 lb    Step downs 20x ea 6\"  2 lb    TG squats  L35    Leg press 1x10  1x10 30 lb  40 lb    Heel raise press 20x 30 lb    Resisted walking on FT, forward, backwards 3 reps ea  3 plates Regressed 1.93 for control   Monster walks - lateral 2 laps blue Added 6.14               Sitting      LAQ  20x 10 lbs    Seated HS stretch  5x 10 sec    Marching 10 x 2     HS curls 20x Grey           Supine      SLR  2 lbs    bridging      Hip add  ball    SL hip abd  2 lb    SL clams  grey Inc rep               Prone      Prone Quad Stretch w/ strap   20 sec    HS curls  2 lbs    Hip ext        Other:       Treatment Charges: Mins Units   []  Modalities     [x]  Ther Exercise 45 3   []  Manual Therapy     []  Ther Activities     []  Aquatics     [x]  Vasocompression 15 1   []  Other     Total Treatment time 60 4       Assessment: [x] Progressing toward goals. Patient is demonstrating increase strength and muscular endurance. Added Lateral resistive walks with band to increase hip abd/add strength and control. Regressed resistance with walk outs to focus on control and technique with good carry over. SLS added to improve balance and joint stability necessary for ankle strategies. Mat exercises held to focus on functional skills. Ended with vaso compression. []  No change. [] Other:                  [x] Patient would continue to benefit from skilled physical therapy services in order to: improve LLE strength, improve balance bilaterally, and improve function for ADL/IADL's and work. STG: (to be met in 8 treatments)  1. ? Pain:  a. Pt will report max of 5/10 pain after a shift at work MET, max pain level 4/10  2. ? ROM:  a. Will be independent in HEP stretching to maintain muscle length/joint mobility for pain reduction and joint health.  MET  3. ? Strength:  a.  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 MET 4+/5  b. Pt will demo 5-/5 L hip flexor strength to improve L swing phase of gait. 4. ? Function: MET 5/5   a. Will be able to ambulate 500 ft with no more than minimal antalgic gait and more even stance time. MET  5. Independent with Home Exercise Programs MET     LTG: (to be met in 12 treatments)  6. ? Pain:  a. Pt will report max of 3/10 pain after a shift at work   7. ? Strength:  a. Pt will demo 4+/5 bilat adductor strength to further improve hip and knee stability w/ higher level tasks. b. Pt will demo 5/5 L knee strength grossly to indicate return to prior level of strength, improve joint WB tolerance  8. ? Function:  a. Will be able to ambulate 500 ft with no antalgic gait and even stance time. b. Will report no more than 40% impairment on LEFI to indicate subjective improvement in L knee function  9. Independent with Home Exercise Programs                 Patient goals: [place] my knee feel better\"     Pt. Education:  [x] Yes  [] No  [x] Reviewed Prior HEP/Ed  Method of Education: [x] Verbal  [x] Demo  [] Written  Comprehension of Education:  [x] Verbalizes understanding. [x] Demonstrates understanding. [] Needs review. [x] Demonstrates/verbalizes HEP/Ed previously given. Plan: [x] Continue current frequency toward long and short term goals.      [x] Specific Instructions for subsequent treatments:  - high knees, step downs      Time In: 330 pm            Time Out: 4:35 pm    Electronically signed by:  Jade Underwood PTA

## 2021-06-17 ENCOUNTER — HOSPITAL ENCOUNTER (OUTPATIENT)
Dept: PHYSICAL THERAPY | Age: 56
Setting detail: THERAPIES SERIES
Discharge: HOME | End: 2021-06-17
Payer: COMMERCIAL

## 2021-06-17 PROCEDURE — 97016 VASOPNEUMATIC DEVICE THERAPY: CPT

## 2021-06-17 PROCEDURE — 97110 THERAPEUTIC EXERCISES: CPT

## 2021-06-17 NOTE — FLOWSHEET NOTE
[x] [facility]) - [facility] CENTER &  Therapy  [address]  [phone]  F: [phone] [] [address]  [facility]   [address]  P: [phone]  F: [phone] [] [address]  [address]  P: [phone]  F: [phone] [] [address]  P: [phone]  F: [phone] [] [address]  [facility]   [address]   [place]: [phone]  F: [phone]      Physical Therapy Daily Treatment Note    Date:  2021  Patient Name:  Dena Castro    :  1965  MRN: [id number]  Physician: Warden Haris DO                                   Insurance: [employer]  Medical Diagnosis: Arthritis of L knee          Rehab Codes: M25.562, M25.662, R29.3, R53.1, R26.9  Onset date: 21                 Next Dr's appt. : 21     Visit# / total visits: 10/12    Cancels/No Shows: 0/0    Subjective:    Pain:  [x] Yes  [] No Location: medially left        Pain Rating: (0-10 scale) 2-3/10  Pain altered Tx:  [x] No  [] Yes  Action:  Comments:   Patient reports L knee pain is not too bad. Just finished 8 hr shift. Is interested talking to Dr Guille Edward about a brace for working hrs.          Objective:  Modalities: Vasocompression - Game Ready to left knee at the end of rx supine, 36 deg, med pressure x 15 min  -    Exercises:  Exercise Reps/ Time Weight/ Level Comments   SciFit 5 min L3           Standing         Mini Squat 20x   Chair to visual   CKC mini squats/lunges   focus on more glut involvement    Calf stretches 2 ways 5 x 5-10 sec gastroc and soleus on wedge   Standing Calf Raises 20 x 2 lb  4\"     Marches  20 x 2 lb    HS curl 20 x 2 lb    Hip abd 20x 2 lb    Hip ext 20x 2 lb    Hip flexor 20x 2 lb    4 way hip  Lime    Tandem stance  2x20\"     SLS 2x15\"     Step ups 20x ea 8\"  2 lb    Step downs 20x ea 6\"  2 lb    TG squats  L35    Leg press 1x10  1x10 30 lb  40 lb    Heel raise press 20x 30 lb    Resisted walking on FT, forward, backwards 3 reps ea 2 plates    Monster walks - lateral 2 laps blue Added 6.14               Sitting      LAQ  20x 10 lbs    Seated HS stretch  5x 10 sec    Marching 10 x 2     HS curls 20x Grey           Supine      SLR  2 lbs    bridging      Hip add  ball    SL hip abd  2 lb    SL clams  grey Inc rep               Prone      Prone Quad Stretch w/ strap   20 sec    HS curls  2 lbs    Hip ext        Other:       Treatment Charges: Mins Units   []  Modalities     [x]  Ther Exercise 42 3   []  Manual Therapy     []  Ther Activities     []  Aquatics     [x]  Vasocompression 15 1   []  Other     Total Treatment time 57 4       Assessment: [x] Progressing toward goals. Good recall of exercises. Patient is able to complete exercise log with good strength and improved joint stability; however, continues to express pain during work. Ended with vaso compression. []  No change. [] Other:                  [x] Patient would continue to benefit from skilled physical therapy services in order to: improve LLE strength, improve balance bilaterally, and improve function for ADL/IADL's and work. STG: (to be met in 8 treatments)  1. ? Pain:  a. Pt will report max of 5/10 pain after a shift at work MET, max pain level 4/10  2. ? ROM:  a. Will be independent in HEP stretching to maintain muscle length/joint mobility for pain reduction and joint health.  MET  3. ? Strength:  a. 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 MET 4+/5  b. Pt will demo 5-/5 L hip flexor strength to improve L swing phase of gait. 4. ? Function: MET 5/5   a.  Will be able to ambulate 500 ft with no more than minimal antalgic gait and more even stance time. MET  5. Independent with Home Exercise Programs MET     LTG: (to be met in 12 treatments)  6. ? Pain:  a. Pt will report max of 3/10 pain after a shift at work   7. ? Strength:  a. Pt will demo 4+/5 bilat adductor strength to further improve hip and knee stability w/ higher level tasks. b. Pt will demo 5/5 L knee strength grossly to indicate return to prior level of strength, improve joint WB tolerance  8. ? Function:  a. Will be able to ambulate 500 ft with no antalgic gait and even stance time. b. Will report no more than 40% impairment on LEFI to indicate subjective improvement in L knee function  9. Independent with Home Exercise Programs                 Patient goals: [place] my knee feel better\"     Pt. Education:  [x] Yes  [] No  [x] Reviewed Prior HEP/Ed  Method of Education: [x] Verbal  [x] Demo  [] Written  Comprehension of Education:  [x] Verbalizes understanding. [x] Demonstrates understanding. [] Needs review. [x] Demonstrates/verbalizes HEP/Ed previously given. Plan: [x] Continue current frequency toward long and short term goals.      [x] Specific Instructions for subsequent treatments:  - high knees, step downs      Time In: 330 pm            Time Out: 432 pm    Electronically signed by:  Geovanna Gutierres PTA

## 2021-06-21 ENCOUNTER — HOSPITAL ENCOUNTER (OUTPATIENT)
Dept: PHYSICAL THERAPY | Age: 56
Setting detail: THERAPIES SERIES
Discharge: HOME | End: 2021-06-21
Payer: COMMERCIAL

## 2021-06-21 PROCEDURE — 97016 VASOPNEUMATIC DEVICE THERAPY: CPT

## 2021-06-21 PROCEDURE — 97110 THERAPEUTIC EXERCISES: CPT

## 2021-06-21 NOTE — FLOWSHEET NOTE
[x] [facility]) - [facility] &  Therapy  [address]  [phone]  F: [phone] [] [address]  [place] [address]   [address]  P: [phone]  F: [phone] [] [place]  [address]  P: [phone]  F: [phone] [] [address]  P: [phone]  F: [phone] [] [address]  [facility]   [address]   [place]: [phone]  F: [phone]      Physical Therapy Daily Treatment Note    Date:  2021  Patient Name:  Alina Tate    :  1965  MRN: [id number]  Physician: Rian Patton DO                                   Insurance: [employer]  Medical Diagnosis: Arthritis of L knee          Rehab Codes: M25.562, M25.662, R29.3, R53.1, R26.9  Onset date: 21                 Next Dr's appt. : 21     Visit# / total visits:     Cancels/No Shows: 0/0    Subjective:    Pain:  [x] Yes  [] No Location: medially left        Pain Rating: (0-10 scale) 4-5/10  Pain altered Tx:  [x] No  [] Yes  Action:  Comments: Patient arrived with increase inner knee pain. Noted he drove to [place] and did lot of walking over the weekend. Pain is along the inside of the knee.              Objective:  Modalities: Vasocompression - Game Ready to left knee at the end of rx supine, 36 deg, med pressure x 15 min  -    Exercises:  Exercise Reps/ Time Weight/ Level Comments   SciFit 5 min L3           Standing         Mini Squat 20x   Chair to visual   CKC mini squats/lunges   focus on more glut involvement    Calf stretches 2 ways 5 x 5-10 sec gastroc and soleus on wedge   Standing Calf Raises 20 x 2 lb  4\"     Marches  20 x 2 lb    HS curl 20 x 2 lb    Hip abd 20x 2 lb    Hip ext 20x 2 lb    Hip flexor 20x 2 lb    4 way hip Lime    Tandem stance  2x20\"     SLS 3x30\"  Inc duration   Step ups  8\"  2 lb    Step downs  6\"  2 lb    Step up and overs/ back 10x 6\"  2 lb  Added 6.21   Lateral step ups both ways 10x 6\"  2 lb Added 6.21   Lunges 10x ea 2 lbs Fwd/ lateral   2 lbs added 6.21   TG squats  L35    Leg press 1x10  1x10 30 lb  40 lb    Heel raise press 20x 30 lb    Resisted walking on FT, forward, backwards 3 reps ea 2 plates    Monster walks - lateral 2 laps blue    Monster walkls 2 x 10 ft blue Added 6.21    Difficult technique   Re bounder 10x  Toe touch                     Sitting      LAQ  20x 10 lbs    Seated HS stretch  5x 10 sec    Marching 10 x 2     HS curls 20x Grey           Supine      SLR  2 lbs    bridging      Hip add  ball    SL hip abd  2 lb    SL clams  grey Inc rep               Prone      Prone Quad Stretch w/ strap   20 sec    HS curls  2 lbs    Hip ext        Other:       Treatment Charges: Mins Units   []  Modalities     [x]  Ther Exercise 45 3   []  Manual Therapy     []  Ther Activities     []  Aquatics     [x]  Vasocompression 15 1   []  Other     Total Treatment time 60 4       Assessment: [x] Progressing toward goals. Patient was able to complete reps of 20 today with improved muscular endurance. Added lunges with focus on technique with knee in alignment with foot. Progressed with re bounder using SLS with toe touch balance with fair tolerance. Patient is improving with strength but still has difficulty with SLS and pain level after increased activity. Ended with vaso compression to decrease knee pain with patient noting 4/10 at end of exercise log. []  No change. [x] Other: Patient has agreed to finish therapy with 1 visit remaining. Wants to discuss options with ortho on 6.30.21.                    [x] Patient would continue to benefit from skilled physical therapy services in order to: improve LLE strength, improve balance bilaterally, and improve function for ADL/IADL's and work.      STG: (to be met in 8 treatments)  1. ? Pain:  a. Pt will report max of 5/10 pain after a shift at work MET, max pain level 4/10  2. ? ROM:  a. Will be independent in HEP stretching to maintain muscle length/joint mobility for pain reduction and joint health.  MET  3. ? Strength:  a. 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 MET 4+/5  b. Pt will demo 5-/5 L hip flexor strength to improve L swing phase of gait. 4. ? Function: MET 5/5   a. Will be able to ambulate 500 ft with no more than minimal antalgic gait and more even stance time. MET  5. Independent with Home Exercise Programs MET     LTG: (to be met in 12 treatments)  6. ? Pain:  a. Pt will report max of 3/10 pain after a shift at work   7. ? Strength:  a. Pt will demo 4+/5 bilat adductor strength to further improve hip and knee stability w/ higher level tasks. b. Pt will demo 5/5 L knee strength grossly to indicate return to prior level of strength, improve joint WB tolerance  8. ? Function:  a. Will be able to ambulate 500 ft with no antalgic gait and even stance time. b. Will report no more than 40% impairment on LEFI to indicate subjective improvement in L knee function  9. Independent with Home Exercise Programs                 Patient goals: [place] my knee feel better\"     Pt. Education:  [x] Yes  [] No  [x] Reviewed Prior HEP/Ed  Method of Education: [x] Verbal  [x] Demo  [] Written  Comprehension of Education:  [x] Verbalizes understanding. [x] Demonstrates understanding. [] Needs review. [x] Demonstrates/verbalizes HEP/Ed previously given. Plan: [x] Continue current frequency toward long and short term goals.      [x] Specific Instructions for subsequent treatments:  - high knees, step downs      Time In: 328 pm            Time Out: 433 pm    Electronically signed by:  Lila Maddox PTA

## 2021-06-24 ENCOUNTER — HOSPITAL ENCOUNTER (OUTPATIENT)
Dept: PHYSICAL THERAPY | Age: 56
Setting detail: THERAPIES SERIES
Discharge: HOME | End: 2021-06-24
Payer: COMMERCIAL

## 2021-06-29 RX ORDER — METHYLPREDNISOLONE ACETATE 80 MG/ML
80 INJECTION, SUSPENSION INTRA-ARTICULAR; INTRALESIONAL; INTRAMUSCULAR; SOFT TISSUE ONCE
Status: COMPLETED | OUTPATIENT
Start: 2021-06-29 | End: 2021-06-29

## 2021-06-29 RX ORDER — BUPIVACAINE HYDROCHLORIDE 2.5 MG/ML
2 INJECTION, SOLUTION INFILTRATION; PERINEURAL ONCE
Status: COMPLETED | OUTPATIENT
Start: 2021-06-29 | End: 2021-06-29

## 2021-06-29 RX ADMIN — METHYLPREDNISOLONE ACETATE 80 MG: 80 INJECTION, SUSPENSION INTRA-ARTICULAR; INTRALESIONAL; INTRAMUSCULAR; SOFT TISSUE at 15:55

## 2021-06-29 RX ADMIN — BUPIVACAINE HYDROCHLORIDE 5 MG: 2.5 INJECTION, SOLUTION INFILTRATION; PERINEURAL at 15:55

## 2021-06-30 ENCOUNTER — OFFICE VISIT (OUTPATIENT)
Dept: ORTHOPEDIC SURGERY | Age: 56
End: 2021-06-30
Payer: COMMERCIAL

## 2021-06-30 VITALS — WEIGHT: 268 LBS | TEMPERATURE: 96 F | HEIGHT: 69 IN | BODY MASS INDEX: 39.69 KG/M2

## 2021-06-30 DIAGNOSIS — M23.92 INTERNAL DERANGEMENT OF KNEE, LEFT: Primary | ICD-10-CM

## 2021-06-30 DIAGNOSIS — M17.12 ARTHRITIS OF LEFT KNEE: ICD-10-CM

## 2021-06-30 PROCEDURE — 99214 OFFICE O/P EST MOD 30 MIN: CPT | Performed by: ORTHOPAEDIC SURGERY

## 2021-06-30 ASSESSMENT — ENCOUNTER SYMPTOMS
CHEST TIGHTNESS: 0
ABDOMINAL PAIN: 0
VOMITING: 0
APNEA: 0
COUGH: 0

## 2021-06-30 NOTE — PROGRESS NOTES
[address] [facility]  [address] [address] [facility] [address]  Dept: [phone]  Dept Fax: [phone]        Ambulatory Follow Up      Subjective:   Luis Miguel Iraheta is a 55y.o. year old male who presents to our office today for routine followup regarding his   1. Internal derangement of knee, left    2. Arthritis of left knee    . Chief Complaint   Patient presents with    Follow-up     left knee       HPI- Luis Miguel Iraheta  is a 55 y.o. male who presents today in follow for left knee arthritis. The patient was last seen on 5/5/2021 and underwent treatment in the form of mobic and formal therapy. The patient notes mild improvement with the previous treatment. Patient has avoided golfing and other twisting motions to keep the left knee from being painful. The patient has mild catching and locking of the left knee. Review of Systems   Constitutional: Negative for chills and fever. Respiratory: Negative for apnea, cough and chest tightness. Cardiovascular: Negative for chest pain and palpitations. Gastrointestinal: Negative for abdominal pain and vomiting. Genitourinary: Negative for difficulty urinating. Musculoskeletal: Positive for arthralgias (left knee). Negative for gait problem, joint swelling and myalgias. Neurological: Negative for dizziness, weakness and numbness. I have reviewed the CC, HPI, ROS, PMH, FHX, Social History, and if not present in this note, I have reviewed in the patient's chart. I agree with the documentation provided by other staff and have reviewed their documentation prior to providing my signature indicating agreement. Objective :   Temp 96 °F (35.6 °C)   Ht 5' 9\" (1.753 m)   Wt 268 lb (121.6 kg)   BMI 39.58 kg/m²  Body mass index is 39.58 kg/m². General: Luis Miguel Iraheta is a 55 y.o. male who is alert and oriented and sitting comfortably in our office.   Ortho Exam  MS:  Mild extensor lag left knee. Mildly positive Prashant's left knee. No gross instability of the left knee is appreciated. Motor, sensory, vascular examination of the left lower extremity is grossly intact without focal deficits. Neuro: alert and oriented to person and place. Eyes: Extra-ocular muscles intact  Mouth: Oral mucosa moist. No perioral lesions  Pulm: Respirations unlabored and regular. Symmetric chest excursion without outward deformity is noted. Skin: warm, well perfused  Psych:   Patient has good fund of knowledge and displays understanging of exam, diagnosis, and plan. Radiology:       No results found. Assessment:      1. Internal derangement of knee, left    2. Arthritis of left knee       Plan:      Discussed etiology and natural history of left knee arthritis and possible meniscal tear. The treatment options may include oral anti-inflammatories, bracing, injections, advanced imaging, activity modification, physical therapy and/or surgical intervention. The patient would like to proceed with MRI left knee without contrast to rule out tear. The patient will follow up in the office after MRI. We discussed that the patient should call us with any concerns or questions. Follow up:Return after MRI. No orders of the defined types were placed in this encounter. Orders Placed This Encounter   Procedures    MRI KNEE LEFT WO CONTRAST     Standing Status:   Future     Standing Expiration Date:   6/30/2022     Order Specific Question:   Reason for exam:     Answer:   rule out tear     I, Angela Love LPN am scribing for and in the presence of Dr. Rob Murry  7/2/2021 7:31 AM    I have reviewed and made changes accordingly to the work scribed by Angela Love LPN. The documentation accurately reflects work and decisions made by me. I have also reviewed documentation completed by clinical staff.     Rob Murry DO, [facility]  7/2/2021 7:31 AM    This note is created with the assistance of a speech recognition program.  While intending to generate a document that actually reflects the content of the visit, the document can still have some errors including those of syntax and sound a like substitutions which may escape proof reading.  In such instances, actual meaning can be extrapolated by contextual diversion      Electronically signed by Miracle Pereira on 7/2/2021 at 7:31 AM

## 2021-07-09 ENCOUNTER — HOSPITAL ENCOUNTER (OUTPATIENT)
Age: 56
Setting detail: SPECIMEN
Discharge: HOME | End: 2021-07-09
Payer: COMMERCIAL

## 2021-07-09 LAB
ALBUMIN SERPL-MCNC: 4.5 G/DL (ref 3.5–5.2)
ALBUMIN/GLOBULIN RATIO: 1.6 (ref 1–2.5)
ALP BLD-CCNC: 48 U/L (ref 40–129)
ALT SERPL-CCNC: 23 U/L (ref 5–41)
ANION GAP SERPL CALCULATED.3IONS-SCNC: 14 MMOL/L (ref 9–17)
AST SERPL-CCNC: 18 U/L
BILIRUB SERPL-MCNC: 0.43 MG/DL (ref 0.3–1.2)
BUN BLDV-MCNC: 13 MG/DL (ref 6–20)
BUN/CREAT BLD: ABNORMAL (ref 9–20)
CALCIUM SERPL-MCNC: 9.6 MG/DL (ref 8.6–10.4)
CHLORIDE BLD-SCNC: 104 MMOL/L (ref 98–107)
CHOLESTEROL/HDL RATIO: 6.1
CHOLESTEROL: 230 MG/DL
CO2: 23 MMOL/L (ref 20–31)
CREAT SERPL-MCNC: 0.59 MG/DL (ref 0.7–1.2)
ESTIMATED AVERAGE GLUCOSE: 117 MG/DL
GFR AFRICAN AMERICAN: >60 ML/MIN
GFR NON-AFRICAN AMERICAN: >60 ML/MIN
GFR SERPL CREATININE-BSD FRML MDRD: ABNORMAL ML/MIN/{1.73_M2}
GFR SERPL CREATININE-BSD FRML MDRD: ABNORMAL ML/MIN/{1.73_M2}
GLUCOSE BLD-MCNC: 98 MG/DL (ref 70–99)
HBA1C MFR BLD: 5.7 % (ref 4–6)
HCT VFR BLD CALC: 45.3 % (ref 40.7–50.3)
HDLC SERPL-MCNC: 38 MG/DL
HEMOGLOBIN: 14.8 G/DL (ref 13–17)
IRON: 83 UG/DL (ref 59–158)
LDL CHOLESTEROL: 148 MG/DL (ref 0–130)
MCH RBC QN AUTO: 29.2 PG (ref 25.2–33.5)
MCHC RBC AUTO-ENTMCNC: 32.7 G/DL (ref 28.4–34.8)
MCV RBC AUTO: 89.3 FL (ref 82.6–102.9)
NRBC AUTOMATED: 0 PER 100 WBC
PDW BLD-RTO: 12.9 % (ref 11.8–14.4)
PLATELET # BLD: 272 K/UL (ref 138–453)
PMV BLD AUTO: 9.8 FL (ref 8.1–13.5)
POTASSIUM SERPL-SCNC: 4.7 MMOL/L (ref 3.7–5.3)
PROSTATE SPECIFIC ANTIGEN: 0.7 UG/L
RBC # BLD: 5.07 M/UL (ref 4.21–5.77)
SODIUM BLD-SCNC: 141 MMOL/L (ref 135–144)
TOTAL PROTEIN: 7.3 G/DL (ref 6.4–8.3)
TRIGL SERPL-MCNC: 219 MG/DL
URIC ACID: 5.7 MG/DL (ref 3.4–7)
VITAMIN B-12: 569 PG/ML (ref 232–1245)
VITAMIN D 25-HYDROXY: 42.4 NG/ML (ref 30–100)
VLDLC SERPL CALC-MCNC: ABNORMAL MG/DL (ref 1–30)
WBC # BLD: 7.4 K/UL (ref 3.5–11.3)

## 2021-07-13 LAB — VITAMIN B1 WHOLE BLOOD: 147 NMOL/L (ref 70–180)

## 2021-07-16 ENCOUNTER — HOSPITAL ENCOUNTER (OUTPATIENT)
Dept: MRI IMAGING | Facility: CLINIC | Age: 56
Discharge: HOME | End: 2021-07-18
Payer: COMMERCIAL

## 2021-07-16 DIAGNOSIS — M17.12 ARTHRITIS OF LEFT KNEE: ICD-10-CM

## 2021-07-16 DIAGNOSIS — M23.92 INTERNAL DERANGEMENT OF KNEE, LEFT: ICD-10-CM

## 2021-07-16 PROCEDURE — 73721 MRI JNT OF LWR EXTRE W/O DYE: CPT

## 2021-07-16 NOTE — DISCHARGE SUMMARY
per our policy. [] Pt has completed prescribed number of treatment sessions. [] Other:         Electronically signed by Kallie Cespedes PT on 7/16/2021 at 12:39 PM      If you have any questions or concerns, please don't hesitate to call.   Thank you for your referral.

## 2021-08-16 ENCOUNTER — OFFICE VISIT (OUTPATIENT)
Dept: ORTHOPEDIC SURGERY | Age: 56
End: 2021-08-16
Payer: COMMERCIAL

## 2021-08-16 VITALS — WEIGHT: 272 LBS | BODY MASS INDEX: 40.29 KG/M2 | HEIGHT: 69 IN

## 2021-08-16 DIAGNOSIS — Z01.818 PRE-OP TESTING: ICD-10-CM

## 2021-08-16 DIAGNOSIS — M17.12 ARTHRITIS OF LEFT KNEE: Primary | ICD-10-CM

## 2021-08-16 DIAGNOSIS — S83.242D TEAR OF MEDIAL MENISCUS OF LEFT KNEE, CURRENT, UNSPECIFIED TEAR TYPE, SUBSEQUENT ENCOUNTER: ICD-10-CM

## 2021-08-16 PROCEDURE — 99214 OFFICE O/P EST MOD 30 MIN: CPT | Performed by: ORTHOPAEDIC SURGERY

## 2021-08-16 ASSESSMENT — ENCOUNTER SYMPTOMS
CONSTIPATION: 0
DIARRHEA: 0
COUGH: 0
NAUSEA: 0

## 2021-08-16 NOTE — PROGRESS NOTES
[facility]  [address] [facility] [address]  Dept: [phone]  Dept Fax: [phone]        Ambulatory Follow Up      Subjective:   Snehal Castellano is a 55y.o. year old male who presents to our office today for routine followup regarding his   1. Arthritis of left knee    2. Tear of medial meniscus of left knee, current, unspecified tear type, subsequent encounter    3. Pre-op testing    . Chief Complaint   Patient presents with    Follow-up     L Knee - MRI results        HPI Snehal Castellano  is a 55 y.o. male who presents today in follow for left knee arthritis. The patient was last seen on 6/30/2021 and was ordered an MRI. Patient is here for results. Review of Systems   Constitutional: Negative for chills and fever. Respiratory: Negative for cough. Gastrointestinal: Negative for constipation, diarrhea and nausea. Musculoskeletal: Positive for arthralgias (knee left). Negative for gait problem, joint swelling and myalgias. Neurological: Negative for dizziness, weakness and numbness. I have reviewed the CC, HPI, ROS, PMH, FHX, Social History, and if not present in this note, I have reviewed in the patient's chart. I agree with the documentation provided by other staff and have reviewed their documentation prior to providing my signature indicating agreement. Objective :   Ht 5' 9\" (1.753 m)   Wt 272 lb (123.4 kg)   BMI 40.17 kg/m²  Body mass index is 40.17 kg/m². General: Snehal Castellano is a 55 y.o. male who is alert and oriented and sitting comfortably in our office. Ortho Exam  MS: Evaluation of the Left knee shows no erythema, warmth, skin lesions, signs of infection. moderate Knee effusion is appreciated. Patient has full range of motion of the knee. Tenderness over the medial joint line is appreciated. Patient has a negative patellar grind sign and a negative patellar apprehension sign.   There is no instability with varus and valgus stress applied at 0 and 30° of flexion. A negative anterior drawer and Lachman's test is appreciated. There is increased pain with a medialMcMurray's test but no palpable click. There is no calf tenderness. There is a negative hip log-roll and Stinchfield test.  Motor, sensory, vascular examination to the Left lower extremity is intact without focal deficits. FULL ROM     Neuro: alert and oriented to person and place. Eyes: Extra-ocular muscles intact  Mouth: Oral mucosa moist. No perioral lesions  Pulm: Respirations unlabored and regular. Symmetric chest excursion without outward deformity is noted. Skin: warm, well perfused  Psych:   Patient has good fund of knowledge and displays understanging of exam, diagnosis, and plan. Radiology:     Impression   1. Multidirectional tearing in the periphery of the body and posterior horn   medial meniscus. 2. 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  Mild lateral compartment   chondromalacia.  Moderate medial compartment chondromalacia. 3. 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 4. Ganglion cyst formation arising from Baker's cyst measuring up to 1.4 x   1.0 cm.   5. Grade 1 MCL sprain           Assessment:      1. Arthritis of left knee    2. Tear of medial meniscus of left knee, current, unspecified tear type, subsequent encounter    3. Pre-op testing       Plan:      Discussed etiology and natural history of medial meniscus tear. The treatment options may include oral anti-inflammatories, bracing, injections, advanced imaging, activity modification, physical therapy and/or surgical intervention. The patient would like to proceed with surgical intervention. The patient will follow up 2 weeks post operatively. We discussed that the patient should call us with any concerns or questions. Follow up:Return for Two weeks post op.     No orders of the defined types were placed in this encounter. Orders Placed This Encounter   Procedures    XR CHEST (2 VW)     Standing Status:   Future     Standing Expiration Date:   9/5/2022     Order Specific Question:   Reason for exam:     Answer:   pre-op    Comprehensive Metabolic Panel     Standing Status:   Future     Standing Expiration Date:   12/14/2021    CBC     Standing Status:   Future     Standing Expiration Date:   8/17/2022    Urinalysis     Standing Status:   Future     Standing Expiration Date:   8/17/2022    EKG 12 Lead     Standing Status:   Future     Standing Expiration Date:   8/17/2022     Order Specific Question:   Reason for Exam?     Answer:   Keysha Welch LPN am scribing for and in the presence of Dr. Bernarda Fisher  8/16/2021 9:30 AM  I have reviewed and made changes accordingly to the work scribed by [employer]. The documentation accurately reflects work and decisions made by me. I have also reviewed documentation completed by clinical staff. Bernarda Fisher DO, [facility]  8/16/2021 9:30 AM    This note is created with the assistance of a speech recognition program.  While intending to generate a document that actually reflects the content of the visit, the document can still have some errors including those of syntax and sound a like substitutions which may escape proof reading.  In such instances, actual meaning can be extrapolated by contextual diversion    All details of the procedure, as well as risks, benefits and alternatives, including the option of non operative versus operative treatment were discussed.   The patient understands that risks of the surgery include but are not limited to: bleeding, malunion/nonunion, loss of fixation, loss of reduction, hardware failure, angular or rotational deformity, length discrepancy, limp, transfusion, skin blistering or breakdown, progressive post traumatic degenerative joint disease, possible need for further surgery, bone grafting, infection, nerve injury, paralysis, numbness, blood vessel injury, excessive scaring, wound complication or breakdown, failure of symptoms to improve or actual deterioration in condition, significant acute and/or chronic pain, possible need for amputation, permanent loss of motion, and permanent loss of function. As well as the general complications of anesthesia, which include but are not limited to: myocardial infarction and/or heart attack, stroke, multi organ system failure or even possible death, prolonged hospital stay, blood clots, pulmonary embolism, abnormal reaction to medication, visual and neurological disturbances, constipation, ischemic bowel, bowel obstruction, bowel perforation, ileus and mental status changes. No guarantees were made.     Electronically signed by tSephane Chase DO, FAOAO on 8/16/2021 at 9:30 AM

## 2021-08-17 RX ORDER — SODIUM CHLORIDE, SODIUM LACTATE, POTASSIUM CHLORIDE, CALCIUM CHLORIDE 600; 310; 30; 20 MG/100ML; MG/100ML; MG/100ML; MG/100ML
1000 INJECTION, SOLUTION INTRAVENOUS CONTINUOUS
Status: CANCELLED | OUTPATIENT
Start: 2021-08-17

## 2021-08-18 ENCOUNTER — HOSPITAL ENCOUNTER (OUTPATIENT)
Dept: GENERAL RADIOLOGY | Age: 56
Discharge: HOME | End: 2021-08-20
Payer: COMMERCIAL

## 2021-08-18 ENCOUNTER — HOSPITAL ENCOUNTER (OUTPATIENT)
Dept: PREADMISSION TESTING | Age: 56
Discharge: HOME | End: 2021-08-22
Payer: COMMERCIAL

## 2021-08-18 VITALS
HEART RATE: 67 BPM | SYSTOLIC BLOOD PRESSURE: 140 MMHG | WEIGHT: 272.08 LBS | HEIGHT: 69 IN | BODY MASS INDEX: 40.3 KG/M2 | OXYGEN SATURATION: 95 % | RESPIRATION RATE: 20 BRPM | DIASTOLIC BLOOD PRESSURE: 75 MMHG | TEMPERATURE: 97.7 F

## 2021-08-18 DIAGNOSIS — Z01.818 PRE-OP TESTING: ICD-10-CM

## 2021-08-18 LAB
ALBUMIN SERPL-MCNC: 4.7 G/DL (ref 3.5–5.2)
ALBUMIN/GLOBULIN RATIO: 2 (ref 1–2.5)
ALP BLD-CCNC: 62 U/L (ref 40–129)
ALT SERPL-CCNC: 24 U/L (ref 5–41)
ANION GAP SERPL CALCULATED.3IONS-SCNC: 17 MMOL/L (ref 9–17)
AST SERPL-CCNC: 18 U/L
BILIRUB SERPL-MCNC: 0.33 MG/DL (ref 0.3–1.2)
BILIRUBIN URINE: NEGATIVE
BUN BLDV-MCNC: 14 MG/DL (ref 6–20)
BUN/CREAT BLD: ABNORMAL (ref 9–20)
CALCIUM SERPL-MCNC: 9.2 MG/DL (ref 8.6–10.4)
CHLORIDE BLD-SCNC: 102 MMOL/L (ref 98–107)
CO2: 20 MMOL/L (ref 20–31)
COLOR: YELLOW
COMMENT UA: NORMAL
CREAT SERPL-MCNC: 0.53 MG/DL (ref 0.7–1.2)
GFR AFRICAN AMERICAN: >60 ML/MIN
GFR NON-AFRICAN AMERICAN: >60 ML/MIN
GFR SERPL CREATININE-BSD FRML MDRD: ABNORMAL ML/MIN/{1.73_M2}
GFR SERPL CREATININE-BSD FRML MDRD: ABNORMAL ML/MIN/{1.73_M2}
GLUCOSE BLD-MCNC: 96 MG/DL (ref 70–99)
GLUCOSE URINE: NEGATIVE
HCT VFR BLD CALC: 42.6 % (ref 40.7–50.3)
HEMOGLOBIN: 14.6 G/DL (ref 13–17)
KETONES, URINE: NEGATIVE
LEUKOCYTE ESTERASE, URINE: NEGATIVE
MCH RBC QN AUTO: 29.4 PG (ref 25.2–33.5)
MCHC RBC AUTO-ENTMCNC: 34.3 G/DL (ref 28.4–34.8)
MCV RBC AUTO: 85.9 FL (ref 82.6–102.9)
NITRITE, URINE: NEGATIVE
NRBC AUTOMATED: 0 PER 100 WBC
PDW BLD-RTO: 12.9 % (ref 11.8–14.4)
PH UA: 6 (ref 5–8)
PLATELET # BLD: 276 K/UL (ref 138–453)
PMV BLD AUTO: 9.1 FL (ref 8.1–13.5)
POTASSIUM SERPL-SCNC: 4.2 MMOL/L (ref 3.7–5.3)
PROTEIN UA: NEGATIVE
RBC # BLD: 4.96 M/UL (ref 4.21–5.77)
SODIUM BLD-SCNC: 139 MMOL/L (ref 135–144)
SPECIFIC GRAVITY UA: 1.01 (ref 1–1.03)
TOTAL PROTEIN: 7 G/DL (ref 6.4–8.3)
TURBIDITY: CLEAR
URINE HGB: NEGATIVE
UROBILINOGEN, URINE: NORMAL
WBC # BLD: 10.9 K/UL (ref 3.5–11.3)

## 2021-08-18 PROCEDURE — 80053 COMPREHEN METABOLIC PANEL: CPT

## 2021-08-18 PROCEDURE — 81003 URINALYSIS AUTO W/O SCOPE: CPT

## 2021-08-18 PROCEDURE — 85027 COMPLETE CBC AUTOMATED: CPT

## 2021-08-18 PROCEDURE — 36415 COLL VENOUS BLD VENIPUNCTURE: CPT

## 2021-08-18 PROCEDURE — 71046 X-RAY EXAM CHEST 2 VIEWS: CPT

## 2021-08-18 RX ORDER — CARVEDILOL 12.5 MG/1
1 TABLET ORAL 2 TIMES DAILY
COMMUNITY
Start: 2021-07-14

## 2021-08-18 RX ORDER — SILDENAFIL 50 MG/1
TABLET, FILM COATED ORAL
COMMUNITY
Start: 2021-05-14

## 2021-08-18 NOTE — PROGRESS NOTES
Anesthesia Focused Assessment    Has patient ever tested positive for COVID? No    STOP-BANG Sleep Apnea Questionnaire    SNORE loudly (heard through closed doors)? Yes  TIRED, fatigued, sleepy during daytime? No  OBSERVED stopping breathing during sleep? Yes  High blood PRESSURE being treated? Yes    BMI over 35? Yes  AGE over 48? Yes  NECK circumference over 16\"? No  GENDER (male)? Yes             Total 6  High risk 5-8  Intermediate risk 3-4  Low risk 0-2    Obstructive Sleep Apnea: snores, no cpap  If YES, machine used: no cpap     Type 1 DM:   no  T2DM:  no    Coronary Artery Disease:  Denies, has stress test scheduled. Hypertension:  yes    Active smoker:  Cigar smoker, quit. Drinks Alcohol:  Quit 2018    Dentition: lower front tooth repaired    Defib / AICD / Pacemaker: no      Renal Failure/dialysis:  no    Patient was evaluated in PAT & anesthesia guidelines were applied. NPO guidelines, medication instructions and scheduled arrival time were reviewed with patient. Hx of anesthesia complications:  no  Family hx of anesthesia complications:  no                                                                                                                     Anesthesia contacted:   no  Medical or cardiac clearance ordered: patient has stress test scheduled, echo and carotids per PCP. Will request copies for chart.     Ammy Weaver PA-C  8/18/21  3:42 PM

## 2021-08-18 NOTE — H&P
History and Physical    Pt Name: Keiko White  MRN: [id number]  YOB: 1965  Date of evaluation: 8/18/2021    SUBJECTIVE:   History of Chief Complaint:    Patient presents for PAT appointment, complains of left knee pain. He says that he has pain and some swelling. He has a physical job, is on his feet most of the day. He says that he has had wear and tear on the knee, no real injury that he can recall. He has been diagnosed with meniscus tear he states, scheduled for arthroscopy and meniscectomy. He has had right knee arthroscopy approximately five years ago now as well. Past Medical History    has a past medical history of Alcoholism ([facility] [address] Anxiety, Arthritis, Depression, GERD (gastroesophageal reflux disease), Gout, Hyperlipidemia, Hypertension, Irregular heart beat, Knee pain, right, Meniscus tear, Rosacea, Snores, and Wellness examination. Past Surgical History   has a past surgical history that includes Upper gastrointestinal endoscopy; Nerve Block (1/3/14); Nerve Block (1-17-14); Nerve Block (3/5/14); Colonoscopy (1/16/2016); Colonoscopy; Knee arthroscopy (Right, 10/26/2016); cyst removal (06/06/2017); incision and drainage (Left, 6/7/2017); and hernia repair. Medications  Prior to Admission medications    Medication Sig Start Date End Date Taking?  Authorizing Provider   acetaminophen (TYLENOL) 500 MG tablet Take 1 tablet by mouth every 4 hours as needed for Pain 5/3/21  Yes Branden More, DO   folic acid (FOLVITE) 1 MG tablet Take 1 tablet by mouth daily 6/2/16  Yes Stephanie Lyles PA-C   Multiple Vitamin (MULTIVITAMIN) tablet Take 1 tablet by mouth daily 5/29/16  Yes Laurie Bushy P Blood, DO   vitamin B-1 100 MG tablet Take 1 tablet by mouth daily 5/29/16  Yes Laurie Bushy P Blood, DO   pantoprazole (PROTONIX) 40 MG tablet Take 1 tablet by mouth daily 2/22/16  Yes Stephanie Lyles PA-C   allopurinol (ZYLOPRIM) 300 MG tablet Take 1 tablet by mouth daily 11/20/15  Yes Elina Pino PA-C traZODone (DESYREL) 100 MG tablet 2 po qhs 6/19/15  Yes Stephanie Lyles PA-C   diphenhydrAMINE (BENADRYL) 25 MG capsule Take 25 mg by mouth daily as needed for Itching. Yes Historical Provider, MD   sildenafil (VIAGRA) 50 MG tablet TAKE 1 TABLET BY MOUTH AT ONSET OF INTERCOURSE AS NEEDED 5/14/21   Historical Provider, MD   carvedilol (COREG) 12.5 MG tablet Take 1 tablet by mouth 2 times daily 7/14/21   Historical Provider, MD   meloxicam (MOBIC) 15 MG tablet Take 1 tablet by mouth daily as needed for Pain  Patient taking differently: Take 15 mg by mouth daily as needed for Pain Stop  1 week prior to surgery 5/3/21   Seth Chris DO   naproxen (NAPROSYN) 500 MG tablet Take 1 tablet by mouth 2 times daily  Patient taking differently: Take 500 mg by mouth 2 times daily Stop 1 week prior to surgery 7/27/19   Gabriela Scott DO   NONFORMULARY Indications: Valerian Root 530mg (new med) Stop 1 week prior    Historical Provider, MD     Allergies  is allergic to vicodin [hydrocodone-acetaminophen]. Family History  family history includes Arthritis in his mother; Cancer in his father; Diabetes in his brother. Social History   reports that he has quit smoking. His smoking use included cigars. He has quit using smokeless tobacco.  His smokeless tobacco use included chew. reports previous alcohol use of about 10.0 standard drinks of alcohol per week. reports previous drug use. Drug: Marijuana. Marital Status   Occupation [address] [place] [address]    OBJECTIVE:   VITALS:  height is 5' 9\" (1.753 m) and weight is 272 lb 1.3 oz (123.4 kg). His temporal temperature is 97.7 °F (36.5 °C). His blood pressure is 140/75 (abnormal) and his pulse is 67. His respiration is 20 and oxygen saturation is 95%. CONSTITUTIONAL:alert & cooperative, no acute distress. Very pleasant. SKIN:  Warm and dry, no rashes on exposed areas of skin. HEAD:  Normocephalic, atraumatic. EYES: EOMs intact. EARS:  Hearing grossly WNL. NOSE:  Nares patent. No rhinorrhea   MOUTH/THROAT:  benign  NECK:supple, no lymphadenopathy  LUNGS: Clear to auscultation bilaterally, no wheezes. CARDIOVASCULAR: Heart sounds are normal.  Regular rate and rhythm without murmur. ABDOMEN: soft, non tender, non distended. EXTREMITIES: no edema bilateral lower extremities. Testing:   EKG: in epic and paper chart  Labs pending: drawn 8/18/2021   Chest XRay:  08/18/21    IMPRESSIONS:   1. Left knee pain  2.  has a past medical history of Alcoholism ([address] Anxiety, Arthritis, Depression, GERD (gastroesophageal reflux disease), Gout, Hyperlipidemia, Hypertension, Irregular heart beat, Knee pain, right, Meniscus tear, Rosacea, Snores, and Wellness examination. PLANS:   1.  Left knee arthroscopy, meniscectomy    GOLDIE Flores PA-C  Electronically signed 8/18/2021 at 4:08 PM

## 2021-08-18 NOTE — H&P (VIEW-ONLY)
History and Physical    Pt Name: Tommy York  MRN: [id number]  YOB: 1965  Date of evaluation: 8/18/2021    SUBJECTIVE:   History of Chief Complaint:    Patient presents for PAT appointment, complains of left knee pain. He says that he has pain and some swelling. He has a physical job, is on his feet most of the day. He says that he has had wear and tear on the knee, no real injury that he can recall. He has been diagnosed with meniscus tear he states, scheduled for arthroscopy and meniscectomy. He has had right knee arthroscopy approximately five years ago now as well. Past Medical History    has a past medical history of Alcoholism ([facility] [address] Anxiety, Arthritis, Depression, GERD (gastroesophageal reflux disease), Gout, Hyperlipidemia, Hypertension, Irregular heart beat, Knee pain, right, Meniscus tear, Rosacea, Snores, and Wellness examination. Past Surgical History   has a past surgical history that includes Upper gastrointestinal endoscopy; Nerve Block (1/3/14); Nerve Block (1-17-14); Nerve Block (3/5/14); Colonoscopy (1/16/2016); Colonoscopy; Knee arthroscopy (Right, 10/26/2016); cyst removal (06/06/2017); incision and drainage (Left, 6/7/2017); and hernia repair. Medications  Prior to Admission medications    Medication Sig Start Date End Date Taking?  Authorizing Provider   acetaminophen (TYLENOL) 500 MG tablet Take 1 tablet by mouth every 4 hours as needed for Pain 5/3/21  Yes Brandi Sabillon, DO   folic acid (FOLVITE) 1 MG tablet Take 1 tablet by mouth daily 6/2/16  Yes Stephanie Lyles PA-C   Multiple Vitamin (MULTIVITAMIN) tablet Take 1 tablet by mouth daily 5/29/16  Yes Forrestine Ours P Blood, DO   vitamin B-1 100 MG tablet Take 1 tablet by mouth daily 5/29/16  Yes Forrestine Ours P Blood, DO   pantoprazole (PROTONIX) 40 MG tablet Take 1 tablet by mouth daily 2/22/16  Yes Stephanie Lyles PA-C   allopurinol (ZYLOPRIM) 300 MG tablet Take 1 tablet by mouth daily 11/20/15  Yes Deleanabella Gannon PA-C traZODone (DESYREL) 100 MG tablet 2 po qhs 6/19/15  Yes Stephanie Lyles PA-C   diphenhydrAMINE (BENADRYL) 25 MG capsule Take 25 mg by mouth daily as needed for Itching. Yes Historical Provider, MD   sildenafil (VIAGRA) 50 MG tablet TAKE 1 TABLET BY MOUTH AT ONSET OF INTERCOURSE AS NEEDED 5/14/21   Historical Provider, MD   carvedilol (COREG) 12.5 MG tablet Take 1 tablet by mouth 2 times daily 7/14/21   Historical Provider, MD   meloxicam (MOBIC) 15 MG tablet Take 1 tablet by mouth daily as needed for Pain  Patient taking differently: Take 15 mg by mouth daily as needed for Pain Stop  1 week prior to surgery 5/3/21   Michael Colunga DO   naproxen (NAPROSYN) 500 MG tablet Take 1 tablet by mouth 2 times daily  Patient taking differently: Take 500 mg by mouth 2 times daily Stop 1 week prior to surgery 7/27/19   Toya Persaud DO   NONFORMULARY Indications: Valerian Root 530mg (new med) Stop 1 week prior    Historical Provider, MD     Allergies  is allergic to vicodin [hydrocodone-acetaminophen]. Family History  family history includes Arthritis in his mother; Cancer in his father; Diabetes in his brother. Social History   reports that he has quit smoking. His smoking use included cigars. He has quit using smokeless tobacco.  His smokeless tobacco use included chew. reports previous alcohol use of about 10.0 standard drinks of alcohol per week. reports previous drug use. Drug: Marijuana. Marital Status   Occupation [address] [place] [address]    OBJECTIVE:   VITALS:  height is 5' 9\" (1.753 m) and weight is 272 lb 1.3 oz (123.4 kg). His temporal temperature is 97.7 °F (36.5 °C). His blood pressure is 140/75 (abnormal) and his pulse is 67. His respiration is 20 and oxygen saturation is 95%. CONSTITUTIONAL:alert & cooperative, no acute distress. Very pleasant. SKIN:  Warm and dry, no rashes on exposed areas of skin. HEAD:  Normocephalic, atraumatic. EYES: EOMs intact. EARS:  Hearing grossly WNL. NOSE:  Nares patent. No rhinorrhea   MOUTH/THROAT:  benign  NECK:supple, no lymphadenopathy  LUNGS: Clear to auscultation bilaterally, no wheezes. CARDIOVASCULAR: Heart sounds are normal.  Regular rate and rhythm without murmur. ABDOMEN: soft, non tender, non distended. EXTREMITIES: no edema bilateral lower extremities. Testing:   EKG: in epic and paper chart  Labs pending: drawn 8/18/2021   Chest XRay:  08/18/21    IMPRESSIONS:   1. Left knee pain  2.  has a past medical history of Alcoholism ([address] Anxiety, Arthritis, Depression, GERD (gastroesophageal reflux disease), Gout, Hyperlipidemia, Hypertension, Irregular heart beat, Knee pain, right, Meniscus tear, Rosacea, Snores, and Wellness examination. PLANS:   1.  Left knee arthroscopy, meniscectomy    GOLDIE Messina PA-C  Electronically signed 8/18/2021 at 4:08 PM

## 2021-08-24 DIAGNOSIS — S83.242D TEAR OF MEDIAL MENISCUS OF LEFT KNEE, CURRENT, UNSPECIFIED TEAR TYPE, SUBSEQUENT ENCOUNTER: Primary | ICD-10-CM

## 2021-08-26 ENCOUNTER — HOSPITAL ENCOUNTER (OUTPATIENT)
Dept: NON INVASIVE DIAGNOSTICS | Age: 56
Discharge: HOME | End: 2021-08-26
Payer: COMMERCIAL

## 2021-08-26 ENCOUNTER — HOSPITAL ENCOUNTER (OUTPATIENT)
Dept: VASCULAR LAB | Age: 56
Discharge: HOME | End: 2021-08-26
Payer: COMMERCIAL

## 2021-08-26 ENCOUNTER — HOSPITAL ENCOUNTER (OUTPATIENT)
Dept: NUCLEAR MEDICINE | Age: 56
Discharge: HOME | End: 2021-08-28
Payer: COMMERCIAL

## 2021-08-26 DIAGNOSIS — R42 DIZZINESSES: ICD-10-CM

## 2021-08-26 DIAGNOSIS — R94.31 ABNORMAL EKG: ICD-10-CM

## 2021-08-26 LAB
LV EF: 56 %
LV EF: 58 %
LVEF MODALITY: NORMAL
LVEF MODALITY: NORMAL

## 2021-08-26 PROCEDURE — A9500 TC99M SESTAMIBI: HCPCS | Performed by: PHYSICIAN ASSISTANT

## 2021-08-26 PROCEDURE — 93017 CV STRESS TEST TRACING ONLY: CPT

## 2021-08-26 PROCEDURE — 93306 TTE W/DOPPLER COMPLETE: CPT

## 2021-08-26 PROCEDURE — 2580000003 HC RX 258: Performed by: PHYSICIAN ASSISTANT

## 2021-08-26 PROCEDURE — 93880 EXTRACRANIAL BILAT STUDY: CPT

## 2021-08-26 PROCEDURE — 78452 HT MUSCLE IMAGE SPECT MULT: CPT

## 2021-08-26 PROCEDURE — 6360000002 HC RX W HCPCS: Performed by: PHYSICIAN ASSISTANT

## 2021-08-26 PROCEDURE — 3430000000 HC RX DIAGNOSTIC RADIOPHARMACEUTICAL: Performed by: PHYSICIAN ASSISTANT

## 2021-08-26 RX ORDER — ALBUTEROL SULFATE 90 UG/1
2 AEROSOL, METERED RESPIRATORY (INHALATION) PRN
Status: DISCONTINUED | OUTPATIENT
Start: 2021-08-26 | End: 2021-08-26

## 2021-08-26 RX ORDER — METOPROLOL TARTRATE 5 MG/5ML
5 INJECTION INTRAVENOUS EVERY 5 MIN PRN
Status: DISCONTINUED | OUTPATIENT
Start: 2021-08-26 | End: 2021-08-26

## 2021-08-26 RX ORDER — SODIUM CHLORIDE 0.9 % (FLUSH) 0.9 %
10 SYRINGE (ML) INJECTION PRN
Status: DISCONTINUED | OUTPATIENT
Start: 2021-08-26 | End: 2021-08-29 | Stop reason: HOSPADM

## 2021-08-26 RX ORDER — NITROGLYCERIN 0.4 MG/1
0.4 TABLET SUBLINGUAL EVERY 5 MIN PRN
Status: DISCONTINUED | OUTPATIENT
Start: 2021-08-26 | End: 2021-08-26

## 2021-08-26 RX ORDER — AMINOPHYLLINE DIHYDRATE 25 MG/ML
50 INJECTION, SOLUTION INTRAVENOUS PRN
Status: DISCONTINUED | OUTPATIENT
Start: 2021-08-26 | End: 2021-08-26

## 2021-08-26 RX ORDER — ATROPINE SULFATE 0.1 MG/ML
0.5 INJECTION INTRAVENOUS EVERY 5 MIN PRN
Status: DISCONTINUED | OUTPATIENT
Start: 2021-08-26 | End: 2021-08-26

## 2021-08-26 RX ORDER — SODIUM CHLORIDE 9 MG/ML
500 INJECTION, SOLUTION INTRAVENOUS CONTINUOUS PRN
Status: DISCONTINUED | OUTPATIENT
Start: 2021-08-26 | End: 2021-08-26

## 2021-08-26 RX ORDER — SODIUM CHLORIDE 0.9 % (FLUSH) 0.9 %
5-40 SYRINGE (ML) INJECTION PRN
Status: DISCONTINUED | OUTPATIENT
Start: 2021-08-26 | End: 2021-08-26

## 2021-08-26 RX ADMIN — TETRAKIS(2-METHOXYISOBUTYLISOCYANIDE)COPPER(I) TETRAFLUOROBORATE 15 MILLICURIE: 1 INJECTION, POWDER, LYOPHILIZED, FOR SOLUTION INTRAVENOUS at 08:10

## 2021-08-26 RX ADMIN — SODIUM CHLORIDE 250 ML: 9 INJECTION, SOLUTION INTRAVENOUS at 09:23

## 2021-08-26 RX ADMIN — SODIUM CHLORIDE, PRESERVATIVE FREE 10 ML: 5 INJECTION INTRAVENOUS at 10:32

## 2021-08-26 RX ADMIN — SODIUM CHLORIDE, PRESERVATIVE FREE 10 ML: 5 INJECTION INTRAVENOUS at 09:23

## 2021-08-26 RX ADMIN — TETRAKIS(2-METHOXYISOBUTYLISOCYANIDE)COPPER(I) TETRAFLUOROBORATE 39 MILLICURIE: 1 INJECTION, POWDER, LYOPHILIZED, FOR SOLUTION INTRAVENOUS at 10:32

## 2021-08-26 RX ADMIN — SODIUM CHLORIDE, PRESERVATIVE FREE 10 ML: 5 INJECTION INTRAVENOUS at 08:10

## 2021-08-26 RX ADMIN — REGADENOSON 0.4 MG: 0.08 INJECTION, SOLUTION INTRAVENOUS at 10:31

## 2021-08-26 NOTE — PROCEDURES
[address]                  [facility] [address]                              CARDIAC STRESS TEST    PATIENT NAME: Vinita Roy                   :        1965  MED REC NO:   [id number]                             ROOM:  ACCOUNT NO:   [de-identified]                           ADMIT DATE: 2021  PROVIDER:     Kike Irwin    DATE OF STUDY:  2021    ORDERING PROVIDER:  Christopher Lobo PA-C  PRIMARY CARE PROVIDER:  Christopher Lobo PA-C  INTERPRETING PHYSICIAN:  Kike Irwin MD    LEXISCAN STRESS STUDY:  Indication:  Pre-op clearance, ECG abnormalities    Medications:  Lexiscan, 0.4 mg  Resting heart rate:  64 bpm  Resting blood pressure:  156/91 mm/Hg  Infusion heart rate:  82 bpm  Infusion blood pressure:  161/72 mm/Hg  Resting EKG:  Abnormal (normal sinus rhythm with right bundle branch  block)  Stress heart response:  Normal response  Stress BP response:  Appropriate  Stress EKG(S):  No changes seen  Chest discomfort:  None  Ischemic EKG changes:  None    IMPRESSION:  Electrocardiographically negative Lexiscan stress study. Radio-isotope  results to follow from the department of Nuclear Medicine.         David Newman    D: 2021 14:22:48       T: 2021 14:23:49     LEEANNE/CHAVA  Job#: [id number]     Doc#: Unknown    CC:    ()

## 2021-08-26 NOTE — PROGRESS NOTES
Patient here for stress test. Treadmill with cardiolyte stress was ordered by Kelvin Hassan NP. Patient unable to walk on treadmill due to torn meniscus in left knee.  Office contacted and orders received to change test to chemical, Lexiscan stress test.

## 2021-08-31 ENCOUNTER — ANESTHESIA (OUTPATIENT)
Dept: OPERATING ROOM | Age: 56
End: 2021-08-31
Payer: COMMERCIAL

## 2021-08-31 ENCOUNTER — ANESTHESIA EVENT (OUTPATIENT)
Dept: OPERATING ROOM | Age: 56
End: 2021-08-31
Payer: COMMERCIAL

## 2021-08-31 ENCOUNTER — HOSPITAL ENCOUNTER (OUTPATIENT)
Age: 56
Setting detail: OUTPATIENT SURGERY
Discharge: HOME | End: 2021-08-31
Attending: ORTHOPAEDIC SURGERY | Admitting: ORTHOPAEDIC SURGERY
Payer: COMMERCIAL

## 2021-08-31 VITALS
OXYGEN SATURATION: 96 % | SYSTOLIC BLOOD PRESSURE: 143 MMHG | DIASTOLIC BLOOD PRESSURE: 98 MMHG | RESPIRATION RATE: 8 BRPM

## 2021-08-31 VITALS
BODY MASS INDEX: 40.29 KG/M2 | DIASTOLIC BLOOD PRESSURE: 83 MMHG | RESPIRATION RATE: 16 BRPM | OXYGEN SATURATION: 97 % | HEIGHT: 69 IN | HEART RATE: 58 BPM | TEMPERATURE: 98.1 F | WEIGHT: 272 LBS | SYSTOLIC BLOOD PRESSURE: 155 MMHG

## 2021-08-31 DIAGNOSIS — S83.242D ACUTE MEDIAL MENISCUS TEAR OF LEFT KNEE, SUBSEQUENT ENCOUNTER: Primary | ICD-10-CM

## 2021-08-31 PROCEDURE — 3700000001 HC ADD 15 MINUTES (ANESTHESIA): Performed by: ORTHOPAEDIC SURGERY

## 2021-08-31 PROCEDURE — 7100000041 HC SPAR PHASE II RECOVERY - ADDTL 15 MIN: Performed by: ORTHOPAEDIC SURGERY

## 2021-08-31 PROCEDURE — 6360000002 HC RX W HCPCS: Performed by: ORTHOPAEDIC SURGERY

## 2021-08-31 PROCEDURE — 6360000002 HC RX W HCPCS: Performed by: NURSE ANESTHETIST, CERTIFIED REGISTERED

## 2021-08-31 PROCEDURE — 2580000003 HC RX 258: Performed by: ANESTHESIOLOGY

## 2021-08-31 PROCEDURE — 2709999900 HC NON-CHARGEABLE SUPPLY: Performed by: ORTHOPAEDIC SURGERY

## 2021-08-31 PROCEDURE — 29881 ARTHRS KNE SRG MNISECTMY M/L: CPT | Performed by: ORTHOPAEDIC SURGERY

## 2021-08-31 PROCEDURE — 7100000001 HC PACU RECOVERY - ADDTL 15 MIN: Performed by: ORTHOPAEDIC SURGERY

## 2021-08-31 PROCEDURE — 6370000000 HC RX 637 (ALT 250 FOR IP): Performed by: ANESTHESIOLOGY

## 2021-08-31 PROCEDURE — 7100000040 HC SPAR PHASE II RECOVERY - FIRST 15 MIN: Performed by: ORTHOPAEDIC SURGERY

## 2021-08-31 PROCEDURE — 3600000004 HC SURGERY LEVEL 4 BASE: Performed by: ORTHOPAEDIC SURGERY

## 2021-08-31 PROCEDURE — 2500000003 HC RX 250 WO HCPCS: Performed by: ORTHOPAEDIC SURGERY

## 2021-08-31 PROCEDURE — 2500000003 HC RX 250 WO HCPCS: Performed by: NURSE ANESTHETIST, CERTIFIED REGISTERED

## 2021-08-31 PROCEDURE — 3700000000 HC ANESTHESIA ATTENDED CARE: Performed by: ORTHOPAEDIC SURGERY

## 2021-08-31 PROCEDURE — 6360000002 HC RX W HCPCS

## 2021-08-31 PROCEDURE — 2580000003 HC RX 258: Performed by: ORTHOPAEDIC SURGERY

## 2021-08-31 PROCEDURE — 6360000002 HC RX W HCPCS: Performed by: ANESTHESIOLOGY

## 2021-08-31 PROCEDURE — 7100000000 HC PACU RECOVERY - FIRST 15 MIN: Performed by: ORTHOPAEDIC SURGERY

## 2021-08-31 PROCEDURE — 3600000014 HC SURGERY LEVEL 4 ADDTL 15MIN: Performed by: ORTHOPAEDIC SURGERY

## 2021-08-31 RX ORDER — LIDOCAINE HYDROCHLORIDE 10 MG/ML
INJECTION, SOLUTION EPIDURAL; INFILTRATION; INTRACAUDAL; PERINEURAL PRN
Status: DISCONTINUED | OUTPATIENT
Start: 2021-08-31 | End: 2021-08-31 | Stop reason: SDUPTHER

## 2021-08-31 RX ORDER — PROPOFOL 10 MG/ML
INJECTION, EMULSION INTRAVENOUS PRN
Status: DISCONTINUED | OUTPATIENT
Start: 2021-08-31 | End: 2021-08-31 | Stop reason: SDUPTHER

## 2021-08-31 RX ORDER — MIDAZOLAM HYDROCHLORIDE 1 MG/ML
INJECTION INTRAMUSCULAR; INTRAVENOUS PRN
Status: DISCONTINUED | OUTPATIENT
Start: 2021-08-31 | End: 2021-08-31 | Stop reason: SDUPTHER

## 2021-08-31 RX ORDER — SODIUM CHLORIDE, SODIUM LACTATE, POTASSIUM CHLORIDE, CALCIUM CHLORIDE 600; 310; 30; 20 MG/100ML; MG/100ML; MG/100ML; MG/100ML
1000 INJECTION, SOLUTION INTRAVENOUS CONTINUOUS
Status: DISCONTINUED | OUTPATIENT
Start: 2021-08-31 | End: 2021-08-31 | Stop reason: HOSPADM

## 2021-08-31 RX ORDER — OXYCODONE HYDROCHLORIDE AND ACETAMINOPHEN 5; 325 MG/1; MG/1
1 TABLET ORAL EVERY 6 HOURS PRN
Qty: 20 TABLET | Refills: 0 | Status: SHIPPED | OUTPATIENT
Start: 2021-08-31 | End: 2021-09-05

## 2021-08-31 RX ORDER — OXYCODONE HYDROCHLORIDE AND ACETAMINOPHEN 5; 325 MG/1; MG/1
1 TABLET ORAL
Status: COMPLETED | OUTPATIENT
Start: 2021-08-31 | End: 2021-08-31

## 2021-08-31 RX ORDER — FENTANYL CITRATE 50 UG/ML
INJECTION, SOLUTION INTRAMUSCULAR; INTRAVENOUS PRN
Status: DISCONTINUED | OUTPATIENT
Start: 2021-08-31 | End: 2021-08-31 | Stop reason: SDUPTHER

## 2021-08-31 RX ORDER — LABETALOL HYDROCHLORIDE 5 MG/ML
INJECTION, SOLUTION INTRAVENOUS PRN
Status: DISCONTINUED | OUTPATIENT
Start: 2021-08-31 | End: 2021-08-31 | Stop reason: SDUPTHER

## 2021-08-31 RX ORDER — MAGNESIUM HYDROXIDE 1200 MG/15ML
LIQUID ORAL CONTINUOUS PRN
Status: COMPLETED | OUTPATIENT
Start: 2021-08-31 | End: 2021-08-31

## 2021-08-31 RX ORDER — ONDANSETRON 2 MG/ML
INJECTION INTRAMUSCULAR; INTRAVENOUS PRN
Status: DISCONTINUED | OUTPATIENT
Start: 2021-08-31 | End: 2021-08-31 | Stop reason: SDUPTHER

## 2021-08-31 RX ADMIN — FENTANYL CITRATE 50 MCG: 50 INJECTION, SOLUTION INTRAMUSCULAR; INTRAVENOUS at 08:44

## 2021-08-31 RX ADMIN — MIDAZOLAM HYDROCHLORIDE 2 MG: 1 INJECTION, SOLUTION INTRAMUSCULAR; INTRAVENOUS at 08:39

## 2021-08-31 RX ADMIN — SODIUM CHLORIDE, POTASSIUM CHLORIDE, SODIUM LACTATE AND CALCIUM CHLORIDE 1000 ML: 600; 310; 30; 20 INJECTION, SOLUTION INTRAVENOUS at 07:32

## 2021-08-31 RX ADMIN — HYDROMORPHONE HYDROCHLORIDE 0.5 MG: 1 INJECTION, SOLUTION INTRAMUSCULAR; INTRAVENOUS; SUBCUTANEOUS at 10:06

## 2021-08-31 RX ADMIN — LIDOCAINE HYDROCHLORIDE 50 MG: 10 INJECTION, SOLUTION EPIDURAL; INFILTRATION; INTRACAUDAL; PERINEURAL at 08:44

## 2021-08-31 RX ADMIN — FENTANYL CITRATE 50 MCG: 50 INJECTION, SOLUTION INTRAMUSCULAR; INTRAVENOUS at 09:03

## 2021-08-31 RX ADMIN — Medication 10 MG: at 09:06

## 2021-08-31 RX ADMIN — SODIUM CHLORIDE, POTASSIUM CHLORIDE, SODIUM LACTATE AND CALCIUM CHLORIDE: 600; 310; 30; 20 INJECTION, SOLUTION INTRAVENOUS at 09:21

## 2021-08-31 RX ADMIN — OXYCODONE HYDROCHLORIDE AND ACETAMINOPHEN 1 TABLET: 5; 325 TABLET ORAL at 09:58

## 2021-08-31 RX ADMIN — PROPOFOL 200 MG: 10 INJECTION, EMULSION INTRAVENOUS at 08:44

## 2021-08-31 RX ADMIN — ONDANSETRON 4 MG: 2 INJECTION, SOLUTION INTRAMUSCULAR; INTRAVENOUS at 09:08

## 2021-08-31 RX ADMIN — HYDROMORPHONE HYDROCHLORIDE 0.5 MG: 1 INJECTION, SOLUTION INTRAMUSCULAR; INTRAVENOUS; SUBCUTANEOUS at 10:11

## 2021-08-31 ASSESSMENT — PAIN DESCRIPTION - PAIN TYPE: TYPE: ACUTE PAIN

## 2021-08-31 ASSESSMENT — PAIN SCALES - GENERAL
PAINLEVEL_OUTOF10: 0
PAINLEVEL_OUTOF10: 7
PAINLEVEL_OUTOF10: 7
PAINLEVEL_OUTOF10: 6
PAINLEVEL_OUTOF10: 0
PAINLEVEL_OUTOF10: 7
PAINLEVEL_OUTOF10: 6

## 2021-08-31 ASSESSMENT — PULMONARY FUNCTION TESTS
PIF_VALUE: 13
PIF_VALUE: 3
PIF_VALUE: 8
PIF_VALUE: 1
PIF_VALUE: 5
PIF_VALUE: 3
PIF_VALUE: 2
PIF_VALUE: 5
PIF_VALUE: 6
PIF_VALUE: 3
PIF_VALUE: 9
PIF_VALUE: 4
PIF_VALUE: 7
PIF_VALUE: 4
PIF_VALUE: 4
PIF_VALUE: 2
PIF_VALUE: 4
PIF_VALUE: 4
PIF_VALUE: 13
PIF_VALUE: 4
PIF_VALUE: 14
PIF_VALUE: 2
PIF_VALUE: 10
PIF_VALUE: 17
PIF_VALUE: 6
PIF_VALUE: 5
PIF_VALUE: 1
PIF_VALUE: 4
PIF_VALUE: 4
PIF_VALUE: 1
PIF_VALUE: 5
PIF_VALUE: 5
PIF_VALUE: 4
PIF_VALUE: 3
PIF_VALUE: 8
PIF_VALUE: 4
PIF_VALUE: 3
PIF_VALUE: 4
PIF_VALUE: 7
PIF_VALUE: 1
PIF_VALUE: 5

## 2021-08-31 ASSESSMENT — PAIN - FUNCTIONAL ASSESSMENT: PAIN_FUNCTIONAL_ASSESSMENT: 0-10

## 2021-08-31 ASSESSMENT — PAIN DESCRIPTION - LOCATION: LOCATION: KNEE

## 2021-08-31 ASSESSMENT — PAIN DESCRIPTION - ORIENTATION: ORIENTATION: LEFT

## 2021-08-31 NOTE — ANESTHESIA PRE PROCEDURE
Take 15 mg by mouth daily as needed for Pain Stop  1 week prior to surgery 5/3/21   Marah Arteaga,        Current medications:    Current Facility-Administered Medications   Medication Dose Route Frequency Provider Last Rate Last Admin    lactated ringers infusion 1,000 mL  1,000 mL IntraVENous Continuous Vida Chris MD           Allergies: Allergies   Allergen Reactions    Vicodin [Hydrocodone-Acetaminophen]      itching       Problem List:    Patient Active Problem List   Diagnosis Code    Displacement of lumbar intervertebral disc without myelopathy M51.26    Arthropathy, unspecified, other specified sites M12.9    Hyperglycemia R73.9    GERD (gastroesophageal reflux disease) K21.9    Hypertension I10    Gout M10.9    Dyslipidemia E78.5    MARIA D (acute kidney injury) ([facility] [address] N17.9    Alcohol abuse F10.10    Hypokalemia E87.6    Hypophosphatemia E83.39    Current tear of meniscus of knee S83.209A    Primary osteoarthritis of right knee M17.11    Scrotal sebaceous cyst L72.3       Past Medical History:        Diagnosis Date    Alcoholism ([facility] [address]     Anxiety     Trazodone per pcp    Arthritis     both knees    Depression     GERD (gastroesophageal reflux disease)     on rx    Gout     on rx    Hyperlipidemia     hx of. No longer on rx.  Hypertension     carvedilol    Irregular heart beat     pt states had irreg beats on ekg and now has stress test ordered per pcp.  Will be done at [facility] 8/26/2021    Knee pain, right     Meniscus tear     right    Rosacea     Snores     no cpap    Wellness examination     Daniella Blanco PA-C last seen July 2021       Past Surgical History:        Procedure Laterality Date    COLONOSCOPY  1/16/2016    COLONOSCOPY      CYST REMOVAL  06/06/2017    scrotal cyst    HERNIA REPAIR      umbilical approx 0697    INCISION AND DRAINAGE Left 6/7/2017    SCROTAL EXCISION SEBACEOUS CYST performed by Paulina Joe MD at [address] [place] [address] Right 10/26/2016    partial medial menisectomy    NERVE BLOCK  1/3/14    caudal#1 decadron 10 mg    NERVE BLOCK  1-17-14    caudal epidural steroid block #3 decadron 10 mg    NERVE BLOCK  3/5/14    Rt MBNB  #1   decadron 10mg    UPPER GASTROINTESTINAL ENDOSCOPY      diagnosed with ulcers       Social History:    Social History     Tobacco Use    Smoking status: Former Smoker     Types: Cigars    Smokeless tobacco: Former User     Types: Chew    Tobacco comment: smoked 1-2 cigars/yr   Substance Use Topics    Alcohol use: Not Currently     Alcohol/week: 10.0 standard drinks     Types: 10 Shots of liquor per week     Comment: alcohol free since 2018                                Counseling given: Not Answered  Comment: smoked 1-2 cigars/yr      Vital Signs (Current):   Vitals:    08/31/21 0722   BP: (!) 150/80   Pulse: 62   Resp: 16   Temp: 97.9 °F (36.6 °C)   TempSrc: Temporal   SpO2: 97%   Weight: 272 lb (123.4 kg)   Height: 5' 9\" (1.753 m)                                              BP Readings from Last 3 Encounters:   08/31/21 (!) 150/80   08/18/21 (!) 140/75   07/27/19 125/89       NPO Status: Time of last liquid consumption: 0500                        Time of last solid consumption: 1930                        Date of last liquid consumption: 08/31/21                        Date of last solid food consumption: 08/30/21    BMI:   Wt Readings from Last 3 Encounters:   08/31/21 272 lb (123.4 kg)   08/18/21 272 lb 1.3 oz (123.4 kg)   08/16/21 272 lb (123.4 kg)     Body mass index is 40.17 kg/m².     CBC:   Lab Results   Component Value Date    WBC 10.9 08/18/2021    RBC 4.96 08/18/2021    RBC 4.62 09/02/2011    HGB 14.6 08/18/2021    HCT 42.6 08/18/2021    MCV 85.9 08/18/2021    RDW 12.9 08/18/2021     08/18/2021     09/02/2011       CMP:   Lab Results   Component Value Date     08/18/2021    K 4.2 08/18/2021     08/18/2021    CO2 20 08/18/2021    BUN 14 08/18/2021    CREATININE 0.53 08/18/2021    GFRAA >60 08/18/2021    LABGLOM >60 08/18/2021    GLUCOSE 96 08/18/2021    GLUCOSE 95 03/16/2012    PROT 7.0 08/18/2021    CALCIUM 9.2 08/18/2021    BILITOT 0.33 08/18/2021    ALKPHOS 62 08/18/2021    AST 18 08/18/2021    ALT 24 08/18/2021       POC Tests: No results for input(s): POCGLU, POCNA, POCK, POCCL, POCBUN, POCHEMO, POCHCT in the last 72 hours. Coags:   Lab Results   Component Value Date    PROTIME 11.1 05/28/2016    INR 1.0 05/28/2016       HCG (If Applicable): No results found for: PREGTESTUR, PREGSERUM, HCG, HCGQUANT     ABGs: No results found for: PHART, PO2ART, XGA1AJK, JCU7RVH, BEART, W4RSMOYQ     Type & Screen (If Applicable):  No results found for: LABABO, LABRH    Drug/Infectious Status (If Applicable):  Lab Results   Component Value Date    HEPCAB NONREACTIVE 12/05/2018       COVID-19 Screening (If Applicable): No results found for: COVID19        Anesthesia Evaluation  Patient summary reviewed and Nursing notes reviewed no history of anesthetic complications:   Airway: Mallampati: II  TM distance: >3 FB   Neck ROM: full  Mouth opening: > = 3 FB Dental: normal exam         Pulmonary:Negative Pulmonary ROS and normal exam                               Cardiovascular:  Exercise tolerance: good (>4 METS),   (+) hypertension:, dysrhythmias:,         Rhythm: regular  Rate: normal           Beta Blocker:  Dose within 24 Hrs         Neuro/Psych:   (+) psychiatric history:            GI/Hepatic/Renal:   (+) GERD: well controlled, renal disease:,           Endo/Other: Negative Endo/Other ROS                    Abdominal:             Vascular: Other Findings:             Anesthesia Plan      general     ASA 3     (GA LMA)  Induction: intravenous. MIPS: Postoperative opioids intended and Prophylactic antiemetics administered. Anesthetic plan and risks discussed with patient.       Plan discussed with CRNA and surgical team.                  Mel Sorensen MD 8/31/2021

## 2021-08-31 NOTE — OP NOTE
Operative Note    Patient: Kimo Rdz  YOB: 1965  MRN: [id number]     Date of Procedure: 8/31/2021     Pre-Op Diagnosis: LEFT KNEE MEDIAL MENISCUS TEAR     Post-Op Diagnosis:   Outerbridge I-II patellofemoral compartment  Outerbridge II-III in medial compartment  Posterior horn medial meniscal tear       Procedure(s):  KNEE ARTHROSCOPY PARTIAL MEDIAL MENISCECTOMY     Surgeon(s):  Chasity Diaz DO     Assistant:  Resident: Jesu Balderrama DO     Anesthesia: General     Estimated Blood Loss (mL): 5 mL     Fluids: 400 cc crystalloids     Complications: None     Specimens:   * No specimens in log *     Implants:  * No implants in log *      Drains: * No LDAs found *       Surgical Indications:  Kimo Rdz is a 55 y.o. old male who presented with left knee medil meniscus tear. Following a discussion with the patient regarding both non-operative and operative treatment options, they consented to proceed with knee arthroscopy with partial medial menisectomy. he came to this decision after demonstrating an understanding of our discussion regarding details of the procedure, risks and benefits, expected outcome, and postoperative course. Operative technique: Following appropriate identification of the patient and his operative extremity, consent was reviewed with the patient and his operative extremity was signed. he was wheeled to the operating room where he finished a course of pre-operative antibiotic prophylaxis by way of ancef. The anesthesia service induced general anesthesia. All bony prominences were appropriately padded and the patient was secured to the operative table in a supine position. . The patient's operative extremity was prepped and draped in a standard sterile fashion and a time out was performed during which the correct patient, operative extremity, and procedure were verified.      Standard anteromedial and anterolateral arthroscopic portals were established and an arthroscopic evaluation of the patient's knee was performed with findings as follows:    1. Patellofemoral compartment: Grade I-II  2. Lateral and medial gutters: No loose bodies  3. Medial compartment: Grade II-III changes with posterior horn meniscal tear  4. The notch: No loose body, intact ACL  5. Lateral compartment: No loose body. Following appropriate arthroscopic evaluation of the patient's knee as outlined above I proceeded to perform a medial menisectomy using a combination of a 4-0 shaver and basket forceps. I resected the posterior horn of the medial meniscus. The torn portion of the meniscus was resected to ensure that the remnant meniscus was stable with no loose flaps; consequently, approximately 60% of the meniscus was resected. Once it was determined that the torn portion of the meniscus was fully resected and the remnant meniscus stable, the patient's knee was evacuated of all loose debris and fluid. The patient's portal incisions were then infiltrated with a total of 19 cc of 0.5% Marcaine and 80 mg of depomedrol. The portal incisions were closed using 3-0 nylon sutures. Sterile dressings were applied using adaptic, fluffs, abd and ACE wrap. The patient was awoken, transferred to his bed, and wheeled to recovery in stable condition.      Dr. Sharyle King was present for all critical aspects of the case    Rinku Blakely DO  Orthopedic Surgery Resident, PGY-3  [address]           Electronically signed by Reyes Scudder, DO on 8/31/2021 at 9:41 AM

## 2021-08-31 NOTE — FLOWSHEET NOTE
[facility]  PROGRESS NOTE      Room # STVZ OR POOL RM/NONE   Name: Tommy York            Age: 55 y.o. Gender: male          Episcopal: Voodoo (per chart)     Reason for Visit: Rounding    Overview and Assessment  Tommy York is a 55 y.o. male in pre-surg room with his son at bedside. Patient appeared non-anxious and described his upcoming knee procedure as minor maintenance. He shared thoughts/feelings about his work and shared in positive life review. Interventions   provided active listening and emotional support. Created space for patient to share thoughts/feelings about life review and current health condition. Outcome and Plan  Patient expressed gratitude for visit. Follow as needed/requested. 08/31/21 9359   Encounter Summary   Services provided to: Patient and family together   Referral/Consult From: [facility] Family members   Continue Visiting   (8/31/2021)   Complexity of Encounter Moderate   Length of Encounter 30 minutes   Spiritual Assessment Completed Yes   Routine   Type Pre-procedure   Assessment Approachable   Intervention Explored feelings, thoughts, concerns; Active listening;Sustaining presence/ Ministry of presence   Outcome Acceptance;Comfort;Expressed gratitude;Engaged in conversation; Shared life review;Encouraged; Hopeful;Receptive;Coping;Expressed feelings/needs/concerns       Electronically signed by Lilo Sanders on 8/31/2021 at 10:30 AM.  Alcides Goel  [phone]

## 2021-08-31 NOTE — BRIEF OP NOTE
Brief Postoperative Note      Patient: Ulises Castillo  YOB: 1965  MRN: [id number]    Date of Procedure: 8/31/2021    Pre-Op Diagnosis: LEFT KNEE MEDIAL MENISCUS TEAR    Post-Op Diagnosis:   Outerbridge I-II patellofemoral compartment  Outerbridge II-III in medial compartment  Posterior horn medial meniscal tear       Procedure(s):  KNEE ARTHROSCOPY PARTIAL MEDIAL MENISCECTOMY    Surgeon(s):  Federica Spencer DO    Assistant:  Resident: Sourav Santillan DO    Anesthesia: General    Estimated Blood Loss (mL): 5 mL    Fluids: 400 cc crystalloids    Complications: None    Specimens:   * No specimens in log *    Implants:  * No implants in log *      Drains: * No LDAs found *    Findings: see op note    Electronically signed by Sourav Santillan DO on 8/31/2021 at 9:31 AM

## 2021-08-31 NOTE — INTERVAL H&P NOTE
Pt Name: Nat George  MRN: [id number]  [place]: 1965  Date of evaluation: 8/31/2021    I have reviewed the patient's history and physical examination completed in pre-admission testing on 8/18/21    No changes to history or on examination today, unless noted below. Stress test on 8/26 (per PCP order). Medical clearance granted.      LEIGH ANN Kahn CNP  8/31/21  7:38 AM

## 2021-09-03 ENCOUNTER — HOSPITAL ENCOUNTER (OUTPATIENT)
Dept: PHYSICAL THERAPY | Facility: CLINIC | Age: 56
Setting detail: THERAPIES SERIES
Discharge: HOME | End: 2021-09-03
Payer: COMMERCIAL

## 2021-09-03 PROCEDURE — 97162 PT EVAL MOD COMPLEX 30 MIN: CPT

## 2021-09-03 PROCEDURE — 97110 THERAPEUTIC EXERCISES: CPT

## 2021-09-03 PROCEDURE — 97016 VASOPNEUMATIC DEVICE THERAPY: CPT

## 2021-09-03 NOTE — CONSULTS
[] [facility]) - [facility] &  Therapy  [address]  [phone]  F: [phone] [] [address]  [address] [facility]   [address]  P: [phone]  F: [phone] [] [address] &  Therapy  [address] [facility]  P: [phone]  F: [phone] [x] [email or web address]  P: [phone]  F: [phone] [] [address]  [facility]   [address]   [place]: [phone]  F: [phone]      Physical Therapy Lower Extremity Evaluation    Date:  9/3/2021  Patient: Rachell Rios  : 1965  MRN: [id number]  Physician: Robert Billings DO     Insurance: MM (10% co-ins)  Medical Diagnosis: Left medial meniscus tear    Rehab Codes: Z80.526F  Onset date: 21    Next Dr's appt.: 9/10/21    Subjective:   CC: left knee pain  HPI: The patient developed left knee pain about ix months ago. It didn't improve with PT, he opted to have arthroscopic surgery which was performed by Dr. Eros Collier on 21. His pain has been managed since surgery. 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 He is planning on returning to work in 6-8 weeks. PMHx: [] Unremarkable [] Diabetes [x] HTN  [] Pacemaker   [] MI/Heart Problems [] Cancer [x] Arthritis [x] Other:Prior rigth knee meniscus surgery            [] Refer to full medical chart  In EPIC       Comorbidities:   [x] Obesity [] Dialysis  [] N/A   [] Asthma/COPD [] Dementia [] Other:   [] Stroke [] Sleep apnea [] Other:   [] Vascular disease [] Rheumatic disease [] Other:     Tests: [x] X-Ray: [x] MRI:  [] Other:  MRI:  Impression   1. Multidirectional tearing in the periphery of the body and posterior horn   medial meniscus.    2. 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 Lio Osorio lateral compartment   chondromalacia.  Moderate medial compartment chondromalacia. 3. 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 4. Ganglion cyst formation arising from Baker's cyst measuring up to 1.4 x   1.0 cm.   5. Grade 1 MCL sprain. Medications: [x] Refer to full medical record [] None [] Other:  Allergies:      [x] Refer to full medical record [] None [] Other:    Function:  Hand Dominance  [x] Right  [] Left  Patient lives with: Self   In what type of home []  One story   [x] Two story   [] Split level   Number of stairs to enter    With handrail on the [x]  Right to enter   [x] Left to enter   Bathroom has a []  Tub only  [] Tub/shower combo   [] Walk in shower    []  Grab bars   Washing machine is on []  Main level   [] Second level   [] [employer]   Job Status []  Normal duty   [] Light duty   [x] Off due to condition    []  Retired   [] Not employed   [] Disability  [] Other:  []  Return to work:    Work activities/duties Tray runner/ walking, pushing       ADL/IADL Previous level of function Current level of function Who currently assists the patient with task   Bathing  [] Independent  [] Assist [x] Independent  [] Assist    Dress/grooming [] Independent  [] Assist [x] Independent  [] Assist    Transfer/mobility [] Independent  [] Assist [x] Independent  [] Assist    Feeding [] Independent  [] Assist [x] Independent  [] Assist    Toileting [] Independent  [] Assist [x] Independent  [] Assist    Driving [] Independent  [] Assist [x] Independent  [] Assist    Housekeeping [] Independent  [] Assist [x] Independent  [] Assist    Grocery shop/meal prep [] Independent  [] Assist [x] Independent  [] Assist      Gait Prior level of function Current level of function    [] Independent  [] Assist [x] Independent  [] Assist   Device: [] Independent [x] Independent    [] Straight Cane [] Quad cane [] Straight Cane [] Quad cane    [] Standard walker [] Rolling walker   [] 4 wheeled walker [] Standard walker [] Rolling walker   [] 4 wheeled walker    [] Wheelchair [] Wheelchair     Pain:  [x] Yes  [] No Location: left knee Pain Rating: (0-10 scale) 5/10  Pain altered Tx:  [] Yes  [x] No  Action:    Symptoms:  [x] Improving [] Worsening [] Same  Better:  [] AM    [] PM    [x] Sit    [] Rise/Sit    []Stand    [] Walk    [x] Lying    [] Other:  Worse: [] AM    [] PM    [] Sit    [x] Rise/Sit    []Stand    [x] Walk    [] Lying    [] Bend                      [] Valsalva    [] Other:  Sleep: [x] OK    [] Disturbed    Objective:    ROM  ° A/P STRENGTH TESTS (+/-) Left Right Not Tested    Left Right Left Right Ant. Drawer   [x]   Hip Flex   4 4+ Post. Drawer   [x]   Ext   4 4+ Lachmans   [x]   ER     Valgus Stress   [x]   IR     Varus Stress   [x]   ABD   4 4+ Melissas   [x]   ADD     Apleys Comp.    [x]   Knee Flex 110 125 5 5 Apleys Dist.   [x]   Ext 0 0 4 5 Hip Scouring   [x]   Ankle DF   5 5 LEXIs   [x]   PF   5 5 Piriformis   [x]   INV   5 5 Joses   [x]   EVER   5 5 Talor Tilt   [x]        Pat-Fem Grind   [x]       OBSERVATION No Deficit Deficit Not Tested Comments   Posture       Forward Head [] [] []    Rounded Shoulders [] [] []    Kyphosis [] [] []    Lordosis [] [] []    Lateral Shift [] [] []    Scoliosis [] [] []    Iliac Crest [] [] []    PSIS [] [] []    ASIS [] [] []    Genu Valgus [] [] []    Genu Varus [] [] []    Genu Recurvatum [] [x] [] Bilateral   Pronation [] [] []    Supination [] [] []    Leg Length Discrp [] [] []    Slumped Sitting [] [] []    Palpation [] [x] [] Medial joint line   Sensation [x] [] []    Edema [] [x] [] Expected post op effusion   Neurological [x] [] []    Patellar Mobility [x] [] []    Patellar Orientation [x] [] []    Gait [] [x] [] Analysis:Decreased stance time left, reduced flexion on swing phase       FUNCTION Normal Difficult Unable   Sitting [x] [] []   Standing [] [x] []   Ambulation [] [x] []   Groom/Dress [] [x] [] Lift/Carry [] [x] []   Stairs [] [x] []   Bending [] [x] []   Squat [] [x] []   Kneel [] [] [x]     BALANCE/PROPRIOCEPTION              [x] Not tested   Single leg stance       R                     L                                PAIN   Eyes open                             Sec. Sec                  . []    Eyes closed                          Sec. Sec                  . []      Functional Test: LEFS Score: 47% functionally impaired         Assessment:   The patient is a 55year old male with a chief complaint of left knee pain and signs and symptoms consistent with a diagnosis of S/P left medial menisectomy with knee OA. He presents with pain, weakness, decreased ROM and functional limitations. He is a good PT candidate to address above mentioned deficits. Problems:    [x] ? Pain:  [x] ? ROM:  [x] ? Strength:  [x] ? Function:  [] Other:       STG: (to be met in 6 treatments)  1. ? Pain:2/10  2. ? ROM: 0-125 AROM  3. ? Function:Normalize gait  4. Patient to be independent with home exercise program as demonstrated by performance with correct form without cues. LTG: (to be met in 12 treatments)  1. Return to work without restrictions  2. Ascend and descend stairs step over step without a rail. Patient goals: Improve knee pain and strength, return to work    Rehab Potential:  [x] Good  [] Fair  [] Poor   Suggested Professional Referral:  [x] No  [] Yes:  Barriers to Goal Achievement:  [] No  [x] Yes: Knee OA, Obesity  Domestic Concerns:  [x] No  [] Yes:    Pt. Education:  [x] Plans/Goals, Risks/Benefits discussed  [x] Home exercise program    Method of Education: [x] Verbal  [x] Demo  [x] Written  Comprehension of Education:  [x] Verbalizes understanding. [x] Demonstrates understanding. [x] Needs Review. [] Demonstrates/verbalizes understanding of HEP/Ed previously given.     Treatment Plan:  [x] Therapeutic Exercise   01131  [] Iontophoresis: 4 mg/mL Dexamethasone Sodium Phosphate  mAmin  79573   [x] Therapeutic Activity  42275 [x] Vasopneumatic cold with compression  09309    [] Gait Training   01154 [] Ultrasound   32331   [x] Neuromuscular Re-education  78842 [] Electrical Stimulation Unattended  21013   [x] Manual Therapy  45796 [] Electrical Stimulation Attended  80423   [x] Instruction in HEP  [] Lumbar/Cervical Traction  99176   [] Aquatic Therapy   26991 [x] Cold/hotpack    [] Massage   77192      [] Dry Needling, 1 or 2 muscles  83244   [] Biofeedback, first 15 minutes   84833  [] Biofeedback, additional 15 minutes   43378 [] Dry Needling, 3 or more muscles  05324       Frequency:  2 x/week for 12 visits        Todays Treatment:  Modalities: Vasopneumatic cold with compression  Precautions:  Exercises:  Exercise Reps/ Time Weight/ Level Comments   Quad sets 2x10     SLR 2x10     LAQ 2x15     Bridge 2x10     Clam 2x10     Abduction 2x10                 Bike ROM 5' Seat 4    Other:Patient education on diagnosis, prognosis and plan of care. Specific Instructions for next treatment: Progressive ROM and open chain hip and knee strengthening. Introduce closed chain with Total gym and/or mini squats and step ups.       Evaluation Complexity:  History (Personal factors, comorbidities) [] 0 [] 1-2 [x] 3+   Exam (limitations, restrictions) [] 1-2 [x] 3 [] 4+   Clinical presentation (progression) [] Stable [x] Evolving  [] Unstable   Decision Making [] Low [x] Moderate [] High    [] Low Complexity [x] Moderate Complexity [] High Complexity       Treatment Charges: Mins Units   [] Evaluation       []  Low       [x]  Moderate       []  High 10 1   []  Modalities     [x]  Ther Exercise 25 2   []  Manual Therapy     []  Ther Activities     []  Aquatics     [x]  Vasocompression 15 1   []  Other       TOTAL TREATMENT TIME: 50    Time in:10:00am   Time Out:10:50am    Electronically signed by: Desi Knapp, PT        Physician Signature:________________________________Date:__________________  By signing above or cosigning this note, I have reviewed this plan of care and certify a need for medically necessary rehabilitation services.      *PLEASE SIGN ABOVE AND FAX BACK ALL PAGES*

## 2021-09-08 ENCOUNTER — HOSPITAL ENCOUNTER (OUTPATIENT)
Dept: PHYSICAL THERAPY | Facility: CLINIC | Age: 56
Setting detail: THERAPIES SERIES
Discharge: HOME | End: 2021-09-08
Payer: COMMERCIAL

## 2021-09-08 PROCEDURE — 97016 VASOPNEUMATIC DEVICE THERAPY: CPT

## 2021-09-08 PROCEDURE — 97110 THERAPEUTIC EXERCISES: CPT

## 2021-09-08 NOTE — FLOWSHEET NOTE
[] [facility]) [facility] &  Therapy  [address]  [phone]  F: [phone] [] [address]  [facility]   [address]  P: [phone]  F: [phone] [] [address]  [address]  P: [phone]  F: [phone] [x] [address]  P: [phone]  F: [phone] [] [address]  [facility]   [address]   [place]: [phone]  F: [phone]      Physical Therapy Daily Treatment Note    Date:  2021  Patient Name:  Maxi Mac    :  1965  MRN: [id number]  Physician: Lele Gómez DO                                   Insurance: [facility] (10% co-ins)  Medical Diagnosis: Left medial meniscus tear                      Rehab Codes: S83.242D  Onset date: 21                 Next Dr's appt.: 9/10/21  Visit# / total visits: 2/12    Cancels/No Shows: 0/0    Subjective:    Pain:  [x] Yes  [] No Location: L knee Pain Rating: (0-10 scale) 4/10  Pain altered Tx:  [] No  [] Yes  Action:  Comments: Pt arrives to therapy reporting some L knee pain. States he has been feeling better each day since the procedure. Objective:  Modalities: Vasopneumatic cold with compression x15'  Precautions:  Exercises:  Exercise Reps/ Time Weight/ Level Comments   Bike ROM 5' Seat 4          Mat      Quad sets 2x10,5\"       SLR 2x10       LAQ 2x15       Bridge 2x10       Clam 2x10       Sidelying hip abduction 2x10                 Standing         Total gym squats 2x10 L16    Mini squats x6  Discontinued due to poor body mechanics   Step ups 2x10 4\" L bias, Slow and controlled   marches 2x10                  Other:     Specific Instructions for next treatment: Progressive ROM and open chain hip and knee strengthening.   Introduce closed chain with Total gym and/or mini squats and step ups. Treatment Charges: Mins Units   []  Modalities     [x]  Ther Exercise 39 3   []  Manual Therapy     []  Ther Activities     []  Aquatics     [x]  Vasocompression 15 1   []  Other     Total Treatment time 54 4       Assessment: [x] Progressing toward goals. Initiated treatment with Bike to warm up followed by mat exercises with overall good tolerance of all exercises. Reviewed HEP as exercises as still new to patient with good carryover. Added total gym squats to progress knee strength with less load on L knee with good tolerance. Added step ups on 4in step providing verbal cues to increase eccentric L quad control by going slow and controlled with each rep with good carryover. Ended treatment with vaso to reduce any post treatment soreness or inflammation with good relief noted at end of treatment. [] No change. [] Other:  [x] Patient would continue to benefit from skilled physical therapy services in order to: He presents with pain, weakness, decreased ROM and functional limitations. He is a good PT candidate to address above mentioned deficits.       STG: (to be met in 6 treatments)  1. ? Pain:2/10  2. ? ROM: 0-125 AROM  3. ? Function:Normalize gait  4. Patient to be independent with home exercise program as demonstrated by performance with correct form without cues.     LTG: (to be met in 12 treatments)  1. Return to work without restrictions  2. Ascend and descend stairs step over step without a rail.                  Patient goals: Improve knee pain and strength, return to work    Pt. Education:  [x] Yes  [] No  [x] Reviewed Prior HEP/Ed  Method of Education: [x] Verbal  [x] Demo  [] Written  Comprehension of Education:  [x] Verbalizes understanding. [] Demonstrates understanding. [x] Needs review. [] Demonstrates/verbalizes HEP/Ed previously given. Plan: [x] Continue current frequency toward long and short term goals.     [x] Specific

## 2021-09-10 ENCOUNTER — OFFICE VISIT (OUTPATIENT)
Dept: ORTHOPEDIC SURGERY | Age: 56
End: 2021-09-10

## 2021-09-10 ENCOUNTER — HOSPITAL ENCOUNTER (OUTPATIENT)
Dept: PHYSICAL THERAPY | Facility: CLINIC | Age: 56
Setting detail: THERAPIES SERIES
Discharge: HOME | End: 2021-09-10
Payer: COMMERCIAL

## 2021-09-10 VITALS — HEIGHT: 69 IN | BODY MASS INDEX: 40.29 KG/M2 | WEIGHT: 272 LBS

## 2021-09-10 DIAGNOSIS — S83.242D TEAR OF MEDIAL MENISCUS OF LEFT KNEE, CURRENT, UNSPECIFIED TEAR TYPE, SUBSEQUENT ENCOUNTER: ICD-10-CM

## 2021-09-10 DIAGNOSIS — M17.12 ARTHRITIS OF LEFT KNEE: Primary | ICD-10-CM

## 2021-09-10 PROCEDURE — 97110 THERAPEUTIC EXERCISES: CPT

## 2021-09-10 PROCEDURE — 99024 POSTOP FOLLOW-UP VISIT: CPT | Performed by: ORTHOPAEDIC SURGERY

## 2021-09-10 PROCEDURE — 97016 VASOPNEUMATIC DEVICE THERAPY: CPT

## 2021-09-10 PROCEDURE — 97140 MANUAL THERAPY 1/> REGIONS: CPT

## 2021-09-10 RX ORDER — MELOXICAM 15 MG/1
15 TABLET ORAL DAILY
Qty: 30 TABLET | Refills: 2 | Status: SHIPPED | OUTPATIENT
Start: 2021-09-10 | End: 2021-12-18 | Stop reason: ALTCHOICE

## 2021-09-10 ASSESSMENT — ENCOUNTER SYMPTOMS
DIARRHEA: 0
COUGH: 0
NAUSEA: 0
CONSTIPATION: 0

## 2021-09-10 NOTE — PROGRESS NOTES
range of motion. Has a mild to moderate limp with start up. Incision clean dry and intact, is healing without any signs or symptoms of infection. Motor, sensory, vascular examination of the left lower extremity is grossly intact without focal deficits  Neuro: alert. oriented  Eyes: Extra-ocular muscles intact  Mouth: Oral mucosa moist. No perioral lesions  Pulm: Respirations unlabored and regular. Skin: warm, well perfused  Psych:   Patient has good fund of knowledge and displays understanging of exam, diagnosis, and plan. Radiology:     Echocardiogram Complete 2D w Doppler w Color    Result Date: 8/26/2021  Transthoracic Echocardiography Report (TTE)  Patient Name SHEILA      Date of Study               08/26/2021               HALEY MOORE   Date of      1965  Gender                      Male  Birth   Age          55 year(s)  Race                           Room Number  OP          Height:                     69 inch, 175.26 cm   Corporate ID [id number]    Weight:                     272 pounds, 123.4 kg  #   Patient Acct [de-identified]   BSA:          2.35 m^2      BMI:      40.17  #                                                              kg/m^2   MR #         [id number]     Asia Pillai   Accession #  [id number]  Interpreting Physician      [address] [place] [address]   Fellow                   Referring Nurse                           Practitioner   Interpreting             Referring Physician         Remigio Floyd. Trupti  Fellow  Type of Study   TTE procedure:2D Echocardiogram, M-Mode, Doppler, Color Doppler. Procedure Date Date: 08/26/2021 Start: 08:34 AM Study Location: [facility] Indications:Cardiomegaly. History / Tech.  Comments: Abnormal EKG, Obesity Patient Status: Outpatient Height: 69 inches Weight: 272.01 pounds BSA: 2.35 m^2 BMI: 40.17 kg/m^2 CONCLUSIONS Summary Left ventricle is in the upper limits of normal in size with systolic function within the range of normal. Estimated EF 55-60%. Left atrium is mildly dilated. Aortic valve sclerosis without stenosis. Mild mitral regurgitation. Signature ----------------------------------------------------------------------------  Electronically signed by Asia Rosas(Sonographer) on 2021 09:26  AM ---------------------------------------------------------------------------- ----------------------------------------------------------------------------  Electronically signed by Herbert Diaz(Interpreting physician) on 2021  11:23 AM ---------------------------------------------------------------------------- FINDINGS Left Atrium Left atrium is mildly dilated. Left Ventricle Left ventricle is in the upper limits of normal in size with systolic function within the range of normal. Estimated EF 55-60%. Right Atrium Right atrium is normal in size. Right Ventricle Normal right ventricular size and function. Mitral Valve Mitral valve structure is normal. Mild mitral regurgitation. Aortic Valve Aortic valve is trileaflet. Aortic valve sclerosis without stenosis. No aortic insufficiency. Tricuspid Valve Normal tricuspid valve structure and function. Estimated right ventricular systolic pressure is 27 mmHg. Pulmonic Valve The pulmonic valve is normal in structure. Pericardial Effusion No significant pericardial effusion is seen. Miscellaneous Normal aortic root dimension. E/E' average = 10.  IVC diameter and inspiratory collapse is normal. M-mode / 2D Measurements & Calculations:   LVIDd:6.2 cm(3.7 - 5.6 cm)       Diastolic ZUVZMN:185.6 ml  LVIDs:4.29 cm(2.2 - 4.0 cm)      Systolic YFYQGU:58.19 ml  IVSd:0.6 cm(0.6 - 1.1 cm)        Aortic Root:3.9 cm(2.0 - 3.7 cm)  LVPWd:1 cm(0.6 - 1.1 cm)         LA Dimension: 4.08 cm(1.9 - 4.0 cm)  Fractional Shortenin.81 %    LA volume/Index: 56.7 ml /24m^2  Calculated LVEF (%): 59.81 %     LVOT:2.4 cm                                   RVDd:3.9 cm   Mitral: Aortic   Valve Area (P1/2-Time): 2.86 cm^2       Peak Velocity: 1.41 m/s  Peak E-Wave: 0.75 m/s                   Mean Velocity: 0.85 m/s  Peak A-Wave: 0.56 m/s                   Peak Gradient: 7.95 mmHg  E/A Ratio: 1.34                         Mean Gradient: 3 mmHg  Peak Gradient: 2.23 mmHg  Mean Gradient: 2 mmHg  Deceleration Time: 271 msec             Area (continuity): 3.77 cm^2  P1/2t: 77 msec                          AV VTI: 30.8 cm   Area (continuity): 2.81 cm^2  Mean Velocity: 0.60 m/s   Tricuspid:                              Pulmonic:   Estimated RVSP: 27 mmHg                 Peak Velocity: 0.95 m/s  Peak TR Velocity: 2.07 m/s              Peak Gradient: 3.61 mmHg  Peak TR Gradient: 17.1396 mmHg  Estimated RA Pressure: 10 mmHg                                           Estimated PASP: 27.14 mmHg  Diastology / Tissue Doppler Septal Wall E' velocity:0.08 m/s Septal Wall E/E':10 Lateral Wall E' velocity:0.08 m/s Lateral Wall E/E':9    XR CHEST (2 VW)    Result Date: 8/18/2021  EXAMINATION: TWO XRAY VIEWS OF THE CHEST 8/18/2021 4:23 pm COMPARISON: None.  HISTORY: ORDERING SYSTEM PROVIDED HISTORY: Pre-op testing TECHNOLOGIST PROVIDED HISTORY: pre-op Reason for Exam: pre op knee, no chest complaints FINDINGS: Normal cardiopericardial silhouette There are no significant pleural, parenchymal or mediastinal findings Degenerative changes of the thoracic spine     No acute cardiopulmonary findings     VL DUP CAROTID BILATERAL    Result Date: 8/26/2021    [facility]  Vascular Carotid Procedure   Patient Name Nat Yee      Date of Study           08/26/2021               HALEY MOORE   Date of      1965  Gender                  Male  Birth   Age          55 year(s)  Race                       Room Number  OP   Corporate ID [id number]    Weight:                 272 pounds, 123.4 kg  #   Patient [place] [de-identified]  #   MR #         [id number]     Sonographer             Ivette Bhat, RVT   Accession # [id number]  Interpreting Physician  Joan De La Paz   Referring                Referring Physician     Naye Kelly. Trupti  Nurse  Practitioner  Procedure Type of Study:   Cerebral: Carotid, Carotid Scan Bilateral.  Indications for Study:Dizziness. Patient Status:Out Patient. Technical Quality:Adequate visualization. Comments:Basic Classification of ICA Stenosis: PSV - Peak Systolic Velocity Normal: No plaque or calcification identified, no elevation of PSV Mild: <50% spectral broadening without increased PSV Moderate: 50 - 69% PSV >125 - <230 cm/sec Severe: 70 - 99% PSV >230 cm/sec Critical: 80 - 99% PSV >230cm/sec and/or End Diastolic Velocities >006JY/JMT. Conclusions   Summary   Simultaneous real time imaging utilizing B-Mode, color flow doppler and  spectral waveform analysis was performed on the bilateral extracerebral  vascular system. The study demonstrates:   Right:  Internal carotid artery has a moderate, 50-69% stenosis based on  velocities. The vertebral artery is patent with antegrade flow. Left:  Internal carotid artery has a mild, <50% stenosis based on velocities. The vertebral artery is patent with antegrade flow. Signature   ----------------------------------------------------------------  Electronically signed by Lisette Oneil RVT(Sonographer)  on 08/26/2021 11:21 AM  ----------------------------------------------------------------   ----------------------------------------------------------------  Electronically signed by Arnoldo Valenzuela(Interpreting  physician) on 08/26/2021 09:38 PM  ----------------------------------------------------------------  Findings:   Right Impression:                    Left Impression:  The common carotid artery has a      Intimal thickening left common  smooth homogeneous plaque causing a  carotid artery. <50% stenosis.                                        The carotid bulb has minimal smooth  The carotid bulb has a smooth        homogeneous plaque causing a <50%  homogeneous plaque causing a <50%    stenosis. stenosis. The internal carotid artery has a  The internal carotid artery has a    smooth homogeneous plaque causing a  smooth homogeneous plaque causing a  <50% stenosis based on velocities. 50-69% stenosis based on velocities. The external carotid artery is  The external carotid artery has a    normal.  smooth homogeneous plaque causing a  <50% stenosis. The vertebral artery is patent with                                       antegrade flow. The vertebral artery is patent with  antegrade flow. Risk Factors   - The patient's risk factor(s) include: dyslipidemia, obesity and arterial     hypertension. Velocities are measured in cm/s ; Diameters are measured in cm Carotid Right Measurements +------------+-------+-------+--------+-------+------------+---------------+ ! Location    ! PSV    ! EDV    ! Angle   ! RI     !%Stenosis   ! Tortuosity     ! +------------+-------+-------+--------+-------+------------+---------------+ ! Prox CCA    !93.8   !20.5   !60      !0.78   !            !               ! +------------+-------+-------+--------+-------+------------+---------------+ ! Mid CCA     !98.2   !23.6   ! 60      !0.76   !            !               ! +------------+-------+-------+--------+-------+------------+---------------+ ! Dist CCA    !85.1   !16.2   ! 60      !0.81   !            !               ! +------------+-------+-------+--------+-------+------------+---------------+ ! Bulb        !85.7   !23     !60      !0.73   !            !               ! +------------+-------+-------+--------+-------+------------+---------------+ ! Prox ICA    ! 143    !44.1   ! 60      !0.69   !            !               ! +------------+-------+-------+--------+-------+------------+---------------+ ! Mid ICA     !93.2   !32.2   !60      !0.65   !            !               ! +------------+-------+-------+--------+-------+------------+---------------+ ! Dist ICA    !90     !32.5   !60      !0.64   !            !               ! +------------+-------+-------+--------+-------+------------+---------------+ ! Prox ECA    !91.4   !13.3   ! 60      !0.85   !            !               ! +------------+-------+-------+--------+-------+------------+---------------+ ! Vertebral   !40.5   !12.7   !60      !0.69   !            !               ! +------------+-------+-------+--------+-------+------------+---------------+   - There is antegrade vertebral flow noted on the right side. - Additional Measurements:ICAPSV/CCAPSV 1.52. ICAEDV/CCAEDV 2.15. Carotid Left Measurements +------------+-------+-------+--------+-------+------------+---------------+ ! Location    ! PSV    ! EDV    ! Angle   ! RI     !%Stenosis   ! Tortuosity     ! +------------+-------+-------+--------+-------+------------+---------------+ ! Prox CCA    !109    !26.9   !60      !0.75   !            !               ! +------------+-------+-------+--------+-------+------------+---------------+ ! Mid CCA     !92.7   !21.4   !60      !0.77   !            !               ! +------------+-------+-------+--------+-------+------------+---------------+ ! Dist CCA    !87.8   !21.4   !60      !0.76   !            !               ! +------------+-------+-------+--------+-------+------------+---------------+ ! Bulb        !81.8   !21.4   !60      !0.74   !            !               ! +------------+-------+-------+--------+-------+------------+---------------+ ! Prox ICA    !75.2   !23.6   ! 60      !0.69   !            !               ! +------------+-------+-------+--------+-------+------------+---------------+ ! Mid ICA     !61.5   !23.2   ! 60      !0.62   !            !               ! +------------+-------+-------+--------+-------+------------+---------------+ ! Dist ICA    !121    ! 45.5   !60      !0.62   !            !               ! +------------+-------+-------+--------+-------+------------+---------------+ ! Prox ECA    !117    !16.2   ! 60      !0.86   !            !               ! +------------+-------+-------+--------+-------+------------+---------------+ ! Vertebral   !44.4   !5.41   ! 60      !0.88   !            !               ! +------------+-------+-------+--------+-------+------------+---------------+   - There is antegrade vertebral flow noted on the left side. - Additional Measurements:ICAPSV/CCAPSV 1.11. ICAEDV/CCAEDV 1.69. NM MYOCARDIAL SPECT REST EXERCISE OR RX    Result Date: 8/26/2021  EXAMINATION: MYOCARDIAL PERFUSION IMAGING 8/26/2021 7:51 am TECHNIQUE: For the rest study, 15 mCi of Tc 99 labeled sestamibi were injected. SPECT images were acquired. Under cardiology supervision, 0.4mg Inetta Clayman was infused. After pharmacologic stress, 39 mCi of Tc 99 labeled sestamibi were injected. SPECT images with ECG gating were acquired. COMPARISON: None Available. HISTORY: ORDERING SYSTEM PROVIDED HISTORY: Abnormal EKG TECHNOLOGIST PROVIDED HISTORY: Reason for Exam: EKG abnormalities Procedure Type->Exercise Reason for Exam: abnormal EKG, hypertension FINDINGS: Images interpreted utilizing [employer] and [employer]. There is normal distribution of radiotracer throughout the myocardium without evidence for a significant reversible or fixed perfusion defect. The gated images show no wall motion abnormalities. Normal myocardial thickening. Perfusion scores are visually adjusted to account for artifact. Summed stress score:  0 Summed rest score:  0 Summed reversibility score:  0 Function: End diastolic volume:  832UY Left ventricular ejection fraction:  56% TID score:  0.96 (scores greater than 1.39 are considered elevated for Lexiscan stress with Tc99m) Notes concerning risk stratification: Risk stratification incorporates both clinical history and some testing results.   Final risk determination is the responsibility of patient in office today. Discussed that there are some mild arthritic changes in his knee, and that he may have some residual pain related to the arthritis. Ordered mobic. Discussed with patient that postoperative healing is a process. He e should take it easy. Avoid strenuous activities. If he does over do it, to expect increased pain and swelling in the following days. He should start mobic, and continue formal physical therapy. Patient states understanding. Patient to remain out of work at this time. We will see him back in 4 weeks, will discuss returning to work at that time. Follow up: Return in about 4 weeks (around 10/8/2021) for Evaluation discuss RTW . Orders Placed This Encounter   Medications    meloxicam (MOBIC) 15 MG tablet     Sig: Take 1 tablet by mouth daily     Dispense:  30 tablet     Refill:  2       No orders of the defined types were placed in this encounter. Sera Patel LPN am scribing for and in the presence of Dr. Onel Bell  9/14/2021 6:23 AM  I have reviewed and made changes accordingly to the work scribed by Wendy Lisa LPN. The documentation accurately reflects work and decisions made by me. I have also reviewed documentation completed by clinical staff.     Onel Bell DO, [facility]  9/14/2021 6:23 AM    This note is created with the assistance of a speech recognition program.  While intending to generate a document that actually reflects the content of the visit, the document can still have some errors including those of syntax and sound a like substitutions which may escape proof reading.  In such instances, actual meaning can be extrapolated by contextual diversion      Electronically signed by Alfredo Burt DO, FAOAO on 9/14/2021 at 6:23 AM

## 2021-09-10 NOTE — FLOWSHEET NOTE
[] [facility]) [facility] &  Therapy  [address]  [phone]  F: [phone] [] [address]  [address]   [address]  P: [phone]  F: [phone] [] [address]  Therapy  [address]  P: [phone]  F: [phone] [x] [address]  P: [phone]  F: [phone] [] [address]  [facility]   [address]   [place]: [phone]  F: [phone]      Physical Therapy Daily Treatment Note    Date:  9/10/2021  Patient Name:  Balwinder Quevedo    :  1965  MRN: [id number]  Physician: Kenisha Baron DO                                   Insurance: [facility] (10% co-ins)  Medical Diagnosis: Left medial meniscus tear                      Rehab Codes: S83.242D  Onset date: 21                 Next 's appt.: 9/10/21  Visit# / total visits: 3/12    Cancels/No Shows: 0/0    Subjective:    Pain:  [x] Yes  [] No Location: L knee Pain Rating: (0-10 scale) 5-6/10  Pain altered Tx:  [] No  [] Yes  Action:  Comments: Pt arrives to therapy reporting increased knee pain, he feels it is from walking too much. He saw Dr. Phyllis Colindres this morning who was satisfied with his progress.      Objective:  Modalities: Vasopneumatic cold with compression x15', 36 deg mod compression  Precautions:  Exercises:  Exercise Reps/ Time Weight/ Level Comments   Bike ROM      Nu-step 10' L4    Mat      Quad sets 2x10,5\"       SLR 2x15       LAQ 2x15       Bridge 2x10  Ball     Clam 2x10       Sidelying hip abduction 2x10                 Standing         Total gym squats  L16    Mini squats   Discontinued due to poor body mechanics   Step ups  4\" L bias, Slow and controlled   marches                   Other:   Manual: Supine retrograde massage with elevation, MFR to quads     Specific Instructions for next treatment: Progressive ROM and open chain hip and knee strengthening. Introduce closed chain with Total gym and/or mini squats and step ups. Treatment Charges: Mins Units   []  Modalities     [x]  Ther Exercise 28 2   [x]  Manual Therapy 10 1   []  Ther Activities     []  Aquatics     [x]  Vasocompression 15 1   []  Other     Total Treatment time 55 4       Assessment: [x] Progressing toward goals. Patient presented with more compensation in his gait, increased effusion and decreased activity tolerance. Nu-step was substituted for the bike due to complaints of discomfort with the seat. Closed chain exercises were not performed due to increased pain. Patient was encouraged to do less walking and to ice 3x daily over the weekend. [] No change. [] Other:  [x] Patient would continue to benefit from skilled physical therapy services in order to: He presents with pain, weakness, decreased ROM and functional limitations. He is a good PT candidate to address above mentioned deficits.       STG: (to be met in 6 treatments)  1. ? Pain:2/10  2. ? ROM: 0-125 AROM  3. ? Function:Normalize gait  4. Patient to be independent with home exercise program as demonstrated by performance with correct form without cues.     LTG: (to be met in 12 treatments)  1. Return to work without restrictions  2. Ascend and descend stairs step over step without a rail.                  Patient goals: Improve knee pain and strength, return to work    Pt. Education:  [x] Yes  [] No  [x] Reviewed Prior HEP/Ed  Method of Education: [x] Verbal  [x] Demo  [] Written  Comprehension of Education:  [x] Verbalizes understanding. [] Demonstrates understanding. [x] Needs review. [] Demonstrates/verbalizes HEP/Ed previously given. Plan: [x] Continue current frequency toward long and short term goals.     [x] Specific Instructions for subsequent treatments: Progressive ROM and open chain hip and knee strengthening.   Introduce closed chain with Total gym and/or mini squats and step ups.         Time In: 1:35pm           Time Out: 2:30pm    Electronically signed by:  Wood Burns PT

## 2021-09-14 ENCOUNTER — HOSPITAL ENCOUNTER (OUTPATIENT)
Dept: PHYSICAL THERAPY | Facility: CLINIC | Age: 56
Setting detail: THERAPIES SERIES
Discharge: HOME | End: 2021-09-14
Payer: COMMERCIAL

## 2021-09-14 PROCEDURE — 97110 THERAPEUTIC EXERCISES: CPT

## 2021-09-14 PROCEDURE — 97016 VASOPNEUMATIC DEVICE THERAPY: CPT

## 2021-09-14 PROCEDURE — 97140 MANUAL THERAPY 1/> REGIONS: CPT

## 2021-09-14 NOTE — FLOWSHEET NOTE
[] [facility]) [facility] CENTER &  Therapy  [address]  [phone]  F: [phone] [] [address]  [address]   [address]  P: [phone]  F: [phone] [] [place]  [address]  P: [phone]  F: [phone] [x] [address]  P: [phone]  F: [phone] [] [address]  [facility]   [address]   [place]: [phone]  F: [phone]      Physical Therapy Daily Treatment Note    Date:  2021  Patient Name:  Kimo Rdz    :  1965  MRN: [id number]  Physician: Giovanni Cabrera DO                                   Insurance: MM (10% co-ins)  Medical Diagnosis: Left medial meniscus tear                      Rehab Codes: S83.242D  Onset date: 21                 Next 's appt.: 9/10/21  Visit# / total visits: 4/    Cancels/No Shows: 0/0    Subjective:    Pain:  [x] Yes  [] No Location: L knee Pain Rating: (0-10 scale) 5-6/10  Pain altered Tx:  [] No  [] Yes  Action:  Comments: Pt reported no significant change in pain level since last visit. Pain is still located in the knee with soreness noted all around.       Objective:  Modalities: Vasopneumatic cold with compression x15', 36 deg mod compression  Precautions:  Exercises:  Exercise Reps/ Time Weight/ Level Comments   Bike ROM      Nu-step 10' L4    Mat      Quad sets 2x10,5\"       SLR 2x15       LAQ 2x15       Bridge 2x10  Ball     Clam 2x10       Sidelying hip abduction 2x10                 Standing         Total gym squats  L16    Mini squats   Discontinued due to poor body mechanics   Step ups  4\" L bias, Slow and controlled   marches                   Other:   Manual: Supine retrograde massage with elevation, MFR to quads     Specific Instructions for next treatment: Progressive ROM and open chain hip and knee strengthening. Introduce closed chain with Total gym and/or mini squats and step ups. Treatment Charges: Mins Units   []  Modalities     [x]  Ther Exercise 28 2   [x]  Manual Therapy 10 1   []  Ther Activities     []  Aquatics     [x]  Vasocompression 15 1   []  Other     Total Treatment time 53 4       Assessment: [x] Progressing toward goals. Pt with no progressions due to pain level and PT requesting to continue with activities from last session. Pt received manual to quads and L knee. Pt concluded treatment with vasocompression to decrease pain and swelling. [] No change. [] Other:  [x] Patient would continue to benefit from skilled physical therapy services in order to: He presents with pain, weakness, decreased ROM and functional limitations. He is a good PT candidate to address above mentioned deficits.       STG: (to be met in 6 treatments)  1. ? Pain:2/10  2. ? ROM: 0-125 AROM  3. ? Function:Normalize gait  4. Patient to be independent with home exercise program as demonstrated by performance with correct form without cues.     LTG: (to be met in 12 treatments)  1. Return to work without restrictions  2. Ascend and descend stairs step over step without a rail.                  Patient goals: Improve knee pain and strength, return to work    Pt. Education:  [x] Yes  [] No  [x] Reviewed Prior HEP/Ed  Method of Education: [x] Verbal  [x] Demo  [] Written  Comprehension of Education:  [x] Verbalizes understanding. [] Demonstrates understanding. [x] Needs review. [] Demonstrates/verbalizes HEP/Ed previously given. Plan: [x] Continue current frequency toward long and short term goals. [x] Specific Instructions for subsequent treatments: Progressive ROM and open chain hip and knee strengthening.   Introduce closed chain with Total gym and/or mini squats and step ups.         Time In: 7555           Time Out: 9578    Electronically signed by:  Kandice Kendall, PTA

## 2021-09-17 ENCOUNTER — HOSPITAL ENCOUNTER (OUTPATIENT)
Dept: PHYSICAL THERAPY | Facility: CLINIC | Age: 56
Setting detail: THERAPIES SERIES
Discharge: HOME | End: 2021-09-17
Payer: COMMERCIAL

## 2021-09-17 PROCEDURE — 97110 THERAPEUTIC EXERCISES: CPT

## 2021-09-17 PROCEDURE — 97016 VASOPNEUMATIC DEVICE THERAPY: CPT

## 2021-09-17 NOTE — FLOWSHEET NOTE
Heel pause walk 2x20'     Toe pause walk 2x20\"     marches 2x20'      Other:   Manual: Supine retrograde massage with elevation, MFR to quads     Specific Instructions for next treatment: Progressive ROM and open chain hip and knee strengthening. Introduce closed chain with Total gym and/or mini squats and step ups. Treatment Charges: Mins Units   []  Modalities     [x]  Ther Exercise 40 3   []  Manual Therapy     []  Ther Activities     []  Aquatics     [x]  Vasocompression 15 1   []  Other     Total Treatment time 55 4       Assessment: [x] Progressing toward goals. Pt continues with antalgic gait although effusion was improved. With gait drills his gait improved. Continued limited tolerance to weight bearing. [] Other:  [x] Patient would continue to benefit from skilled physical therapy services in order to: He presents with pain, weakness, decreased ROM and functional limitations. He is a good PT candidate to address above mentioned deficits.       STG: (to be met in 6 treatments)  1. ? Pain:2/10  2. ? ROM: 0-125 AROM  3. ? Function:Normalize gait  4. Patient to be independent with home exercise program as demonstrated by performance with correct form without cues.     LTG: (to be met in 12 treatments)  1. Return to work without restrictions  2. Ascend and descend stairs step over step without a rail.                  Patient goals: Improve knee pain and strength, return to work    Pt. Education:  [x] Yes  [] No  [x] Reviewed Prior HEP/Ed  Method of Education: [x] Verbal  [x] Demo  [] Written  Comprehension of Education:  [x] Verbalizes understanding. [] Demonstrates understanding. [x] Needs review. [] Demonstrates/verbalizes HEP/Ed previously given. Plan: [x] Continue current frequency toward long and short term goals. [x] Specific Instructions for subsequent treatments: Progressive ROM and open chain hip and knee strengthening.   Introduce closed chain with Total gym and/or mini squats and step ups.         Time In: 0900           Time Out: 0957    Electronically signed by:  Kimi Iverson PT

## 2021-09-21 ENCOUNTER — HOSPITAL ENCOUNTER (OUTPATIENT)
Dept: PHYSICAL THERAPY | Facility: CLINIC | Age: 56
Setting detail: THERAPIES SERIES
Discharge: HOME | End: 2021-09-21
Payer: COMMERCIAL

## 2021-09-21 DIAGNOSIS — M17.12 ARTHRITIS OF LEFT KNEE: Primary | ICD-10-CM

## 2021-09-21 PROCEDURE — 97110 THERAPEUTIC EXERCISES: CPT

## 2021-09-21 PROCEDURE — 97016 VASOPNEUMATIC DEVICE THERAPY: CPT

## 2021-09-21 RX ORDER — DICLOFENAC SODIUM 75 MG/1
75 TABLET, DELAYED RELEASE ORAL 2 TIMES DAILY
Qty: 60 TABLET | Refills: 2 | Status: SHIPPED | OUTPATIENT
Start: 2021-09-21 | End: 2021-12-06 | Stop reason: SDUPTHER

## 2021-09-21 NOTE — FLOWSHEET NOTE
[] [facility]) [facility]  [address]  [phone]  F: [phone] [] [address]  [address] [address]   [address]  P: [phone]  F: [phone] [] [place]  [address]  P: [phone]  F: [phone] [x] [address]  P: [phone]  F: [phone] [] [address]  [facility]   [address]   [place]: [phone]  F: [phone]      Physical Therapy Daily Treatment Note    Date:  2021  Patient Name:  Maxi Mac    :  1965  MRN: [id number]  Physician: Lele Gómez DO                                   Insurance: [facility] (10% co-ins)  Medical Diagnosis: Left medial meniscus tear                      Rehab Codes: S83.242D  Onset date: 21                 Next 's appt.: 9/10/21  Visit# / total visits: 612    Cancels/No Shows: 0/0    Subjective:    Pain:  [x] Yes  [] No Location: L knee Pain Rating: (0-10 scale) 6/10  Pain altered Tx:  [] No  [] Yes  Action:  Comments: Pt states he continues to have pain in the L knee.      Objective:  Modalities: Vasopneumatic cold with compression x15', 36 deg mod compression  Precautions:  Exercises:  Exercise Reps/ Time Weight/ Level Comments    Bike ROM       Nu-step 10' L4  x   Mat       Quad sets 2x10,5\"        SLR 2x15  4#   x   LAQ 2x15  4#   x   Bridge 2x10  Ball   x   Clam 2x15     x   Sidelying hip abduction 2x15     x    prone quad stretch 3x30\"    with strap x   Standing          TKE       Total gym squats 2x15 L16  x   Mini squats   Discontinued due to poor body mechanics    Step ups  4\" L bias, Slow and controlled    marches       Functional       Gait 6x20'  Cues to not drop shoulder and make stance time equal    Heel pause walk 2x20'   x Toe pause walk 2x20\"      marches 2x20'       Other:   Manual: Supine retrograde massage with elevation, MFR to quads     Specific Instructions for next treatment: Progressive ROM and open chain hip and knee strengthening. Introduce closed chain with Total gym and/or mini squats and step ups. Treatment Charges: Mins Units   []  Modalities     [x]  Ther Exercise 40 3   []  Manual Therapy     []  Ther Activities     []  Aquatics     [x]  Vasocompression 15 1   []  Other     Total Treatment time 55 4       Assessment: [] Progressing toward goals. [x] Other: Mild edema noted in L knee upon arrival. Continued with Nustep followed by Total Gym squats with good tolerance. Pt then completed gait training with cues for upright posture, heel strike with L knee straight and toe off with L hip extended, sig improvement after upright posture cueing. Pt cont with NWB mat exercises to maintain strength without aggravating L knee. Ended with vasocompression x15' with L LE elevated for pain and edema management. [x] Patient would continue to benefit from skilled physical therapy services in order to: He presents with pain, weakness, decreased ROM and functional limitations. He is a good PT candidate to address above mentioned deficits.       STG: (to be met in 6 treatments)  1. ? Pain:2/10  2. ? ROM: 0-125 AROM  3. ? Function:Normalize gait  4. Patient to be independent with home exercise program as demonstrated by performance with correct form without cues.     LTG: (to be met in 12 treatments)  1. Return to work without restrictions  2. Ascend and descend stairs step over step without a rail.                  Patient goals: Improve knee pain and strength, return to work    Pt. Education:  [x] Yes  [] No  [x] Reviewed Prior HEP/Ed  Method of Education: [x] Verbal  [x] Demo  [] Written  Comprehension of Education:  [x] Verbalizes understanding. [] Demonstrates understanding. [x] Needs review.   [] Demonstrates/verbalizes HEP/Ed previously given. Plan: [x] Continue current frequency toward long and short term goals. [x] Specific Instructions for subsequent treatments: Progressive ROM and open chain hip and knee strengthening.   Introduce closed chain with Total gym and/or mini squats and step ups.         Time In: 9:00am           Time Out: 10:00am    Electronically signed by:  Brandon Menard PTA

## 2021-09-24 ENCOUNTER — HOSPITAL ENCOUNTER (OUTPATIENT)
Dept: PHYSICAL THERAPY | Facility: CLINIC | Age: 56
Setting detail: THERAPIES SERIES
Discharge: HOME | End: 2021-09-24
Payer: COMMERCIAL

## 2021-09-24 PROCEDURE — 97016 VASOPNEUMATIC DEVICE THERAPY: CPT

## 2021-09-24 PROCEDURE — 97110 THERAPEUTIC EXERCISES: CPT

## 2021-09-24 NOTE — FLOWSHEET NOTE
[] [facility]) [facility]  [facility]  [address]  [phone]  F: [phone] [] [address]  [place] [address]   [address]  P: [phone]  F: [phone] [] [place]  [address]  P: [phone]  F: [phone] [x] [address]  P: [phone]  F: [phone] [] [address]  [facility]   [address]   [place]: [phone]  F: [phone]      Physical Therapy Daily Treatment Note    Date:  2021  Patient Name:  Ulises Castillo    :  1965  MRN: [id number]  Physician: Monae Davis DO                                   Insurance: [employer] (10% co-ins)  Medical Diagnosis: Left medial meniscus tear                      Rehab Codes: S83.242D  Onset date: 21                 Next Dr's appt.: 9/10/21  Visit# / total visits:     Cancels/No Shows: 0/0    Subjective:    Pain:  [x] Yes  [] No Location: L knee Pain Rating: (0-10 scale) 4-5/10  Pain altered Tx:  [] No  [] Yes  Action:  Comments: Pt states he continues to have pain in the L knee. It seems a little better with new antiinflammatory Diclofenac.      Objective:  Modalities: Vasopneumatic cold with compression x15', 36 deg mod compression  Precautions:  Exercises:  Exercise Reps/ Time Weight/ Level Comments    Bike ROM       Nu-step 10' L5  x   Mat       Quad sets 2x10,5\"        SLR 2x15  4#   x   LAQ 2x15  4#   x   Bridge 2x10  Ball   x   Clam 1x15     x   Sidelying hip abduction 1x15     x   Flexion Ball Rll 2x15   x    prone quad stretch 3x30\"    with strap x   Standing          TKE       Total gym squats 3x10 L16  x   Total gym split squat 2x10 L16  x   Mini squats   Discontinued due to poor body mechanics    Step ups  4\" L bias, Slow and controlled marches       Functional       Gait 6x20'  Cues to not drop shoulder and make stance time equal    Heel pause walk 2x20'   x   Toe pause walk 2x20\"      marches 2x20'    x   Other:   Manual: Supine retrograde massage with elevation, MFR to quads (Held)     Specific Instructions for next treatment: Progressive ROM and open chain hip and knee strengthening. Introduce closed chain with Total gym and/or mini squats and step ups. Treatment Charges: Mins Units   []  Modalities     [x]  Ther Exercise 40 3   []  Manual Therapy     []  Ther Activities     []  Aquatics     [x]  Vasocompression 15 1   []  Other     Total Treatment time 55 4       Assessment: [x] Progressing toward goals. Patient displayed improved gait and had reduced complaints of pain. No complaints with open chain exercises, minimal pain with total gym press. Some continued complaints of pain with gait. Patient was instructed to not do extra walking over the weekend to allow his knee to calm down.     [] Other:        STG: (to be met in 6 treatments)  1. ? Pain:2/10  2. ? ROM: 0-125 AROM  3. ? Function:Normalize gait  4. Patient to be independent with home exercise program as demonstrated by performance with correct form without cues.     LTG: (to be met in 12 treatments)  1. Return to work without restrictions  2. Ascend and descend stairs step over step without a rail.                  Patient goals: Improve knee pain and strength, return to work    Pt. Education:  [x] Yes  [] No  [x] Reviewed Prior HEP/Ed  Method of Education: [x] Verbal  [x] Demo  [] Written  Comprehension of Education:  [x] Verbalizes understanding. [] Demonstrates understanding. [x] Needs review. [] Demonstrates/verbalizes HEP/Ed previously given. Plan: [x] Continue current frequency toward long and short term goals. [x] Specific Instructions for subsequent treatments: Progressive ROM and open chain hip and knee strengthening.   Introduce closed chain with Total gym and/or mini squats and step ups.         Time In: 9:00am           Time Out: 10:00am    Electronically signed by:  Dory Smith PT

## 2021-09-28 ENCOUNTER — HOSPITAL ENCOUNTER (OUTPATIENT)
Dept: PHYSICAL THERAPY | Facility: CLINIC | Age: 56
Setting detail: THERAPIES SERIES
Discharge: HOME | End: 2021-09-28
Payer: COMMERCIAL

## 2021-09-28 PROCEDURE — 97016 VASOPNEUMATIC DEVICE THERAPY: CPT

## 2021-09-28 PROCEDURE — 97110 THERAPEUTIC EXERCISES: CPT

## 2021-09-28 NOTE — FLOWSHEET NOTE
[] [facility] CENTER &  Therapy  [address]  [phone]  F: [phone] [] [address]  [place] [address]   [address]  P: [phone]  F: [phone] [] [place]  [address]  P: [phone]  F: [phone] [x] [address]  P: [phone]  F: [phone] [] [address]  [facility]   [address]   [place]: [phone]  F: [phone]      Physical Therapy Daily Treatment Note    Date:  2021  Patient Name:  Kadeem Ferrell    :  1965  MRN: [id number]  Physician: Marian Espinal DO                                   Insurance: [facility] (10% co-ins)  Medical Diagnosis: Left medial meniscus tear                      Rehab Codes: S83.242D  Onset date: 21                 Next Dr's appt.: 9/10/21  Visit# / total visits:     Cancels/No Shows: 0/0    Subjective:    Pain:  [x] Yes  [] No Location: L knee Pain Rating: (0-10 scale) 4-5/10  Pain altered Tx:  [] No  [] Yes  Action:  Comments: Pt reported no change in pain level, stated that he felt really good on  and then tried to walk at park for about . 5 mile which then increased pain level.      Objective:  Modalities: Vasopneumatic cold with compression x15', 36 deg mod compression  Precautions:  Exercises:  Exercise Reps/ Time Weight/ Level Comments    Bike ROM       Nu-step 5' L4  x   Mat       Quad sets 2x10,5\"        SLR 2x15  4#   x   LAQ 2x15  4#   x   Bridge 2x10  Ball   x   Clam 2x15     x   Sidelying hip abduction 2x15     x   Flexion Ball Rll 2x15   x    prone quad stretch 3x30\"    with strap x   Standing          TKE       Total gym squats 3x10 L16  x   Total gym split squat 2x10 L16 Single leg squat with R toe touch to assist x   Mini squats Discontinued due to poor body mechanics    Step ups  4\" L bias, Slow and controlled    marches       Functional       Gait 6x20'  Cues to not drop shoulder and make stance time equal    Heel pause walk 2x20'   x   Toe pause walk 2x20\"      marches 2x20'    x   Other:   Manual: Supine retrograde massage with elevation, MFR to quads (Held)     Specific Instructions for next treatment: Progressive ROM and open chain hip and knee strengthening. Introduce closed chain with Total gym and/or mini squats and step ups. Treatment Charges: Mins Units   []  Modalities     [x]  Ther Exercise 38 3   []  Manual Therapy     []  Ther Activities     []  Aquatics     [x]  Vasocompression 15 1   []  Other     Total Treatment time 53 4       Assessment: [x] Progressing toward goals. Pt with good tolerance to all activities. Increased reps for clamshells and hip abduction to progress LE strength. Pt concluded treatment with vasocompression to decrease pain level. [] Other:        STG: (to be met in 6 treatments)  1. ? Pain:2/10  2. ? ROM: 0-125 AROM  3. ? Function:Normalize gait  4. Patient to be independent with home exercise program as demonstrated by performance with correct form without cues.     LTG: (to be met in 12 treatments)  1. Return to work without restrictions  2. Ascend and descend stairs step over step without a rail.                  Patient goals: Improve knee pain and strength, return to work    Pt. Education:  [x] Yes  [] No  [x] Reviewed Prior HEP/Ed  Method of Education: [x] Verbal  [x] Demo  [] Written  Comprehension of Education:  [x] Verbalizes understanding. [] Demonstrates understanding. [x] Needs review. [] Demonstrates/verbalizes HEP/Ed previously given. Plan: [x] Continue current frequency toward long and short term goals.     [x] Specific Instructions for subsequent treatments: Progressive ROM and open chain hip and knee strengthening. mini squats and step ups.         Time In:  4847 Time Out: 9740    Electronically signed by:  Gia Richmond PTA

## 2021-09-29 ENCOUNTER — HOSPITAL ENCOUNTER (OUTPATIENT)
Dept: ULTRASOUND IMAGING | Facility: CLINIC | Age: 56
Discharge: HOME | End: 2021-10-01
Payer: COMMERCIAL

## 2021-09-29 DIAGNOSIS — R22.1 NECK MASS: ICD-10-CM

## 2021-09-29 PROCEDURE — 76536 US EXAM OF HEAD AND NECK: CPT

## 2021-10-01 ENCOUNTER — HOSPITAL ENCOUNTER (OUTPATIENT)
Dept: PHYSICAL THERAPY | Facility: CLINIC | Age: 56
Setting detail: THERAPIES SERIES
Discharge: HOME | End: 2021-10-01
Payer: COMMERCIAL

## 2021-10-01 PROCEDURE — 97016 VASOPNEUMATIC DEVICE THERAPY: CPT

## 2021-10-01 PROCEDURE — 97110 THERAPEUTIC EXERCISES: CPT

## 2021-10-01 NOTE — FLOWSHEET NOTE
[] [facility]) [facility] CENTER &  Therapy  [address]  [phone]  F: [phone] [] [address]  [place] [address]   [address]  P: [phone]  F: [phone] [] [place]  [address] [facility]  P: [phone]  F: [phone] [x] [address] [facility]  P: [phone]  F: [phone] [] [address]  [facility]   [address]   [place]: [phone]  F: [phone]      Physical Therapy Daily Treatment Note    Date:  10/1/2021  Patient Name:  Thor Arce    :  1965  MRN: [id number]  Physician: Steve Umaña DO                                   Insurance: [facility] (10% co-ins)  Medical Diagnosis: Left medial meniscus tear                      Rehab Codes: S83.242D  Onset date: 21                 Next 's appt.: 9/10/21  Visit# / total visits:     Cancels/No Shows: 0/0    Subjective:    Pain:  [x] Yes  [] No Location: L knee Pain Rating: (0-10 scale) 3-4/10  Pain altered Tx:  [] No  [] Yes  Action:  Comments: Pt states he continues to have pain in the L knee.      Objective:  Modalities: Vasopneumatic cold with compression x15', 36 deg mod compression  Precautions:  Exercises:  Exercise Reps/ Time Weight/ Level Comments    Bike ROM       Nu-step 10' L4  x   Mat       Ball knee flexion roll 2x15     x   SLR 2x15 Ball   x   LAQ 2x15  5#   x   Bridge 2x10  Ball   x   Clam 2x15        Sidelying hip abduction x15     x    prone quad stretch 3x30\"    with strap    Standing          TKE       Total gym squats 2x15 L16     Mini squats   Discontinued due to poor body mechanics    TRX squat 2x10   x   Step ups 2x10 4\" L bias, Slow and controlled x   Forward reach 2x10 ea   x   Functional       Gait 6x20'  Cues to not drop shoulder and make stance time equal x   Heel pause walk 2x20'   x   Toe pause walk 2x20\"   x   marches 2x20'    x   Other:   Manual: Supine retrograde massage with elevation, MFR to quads (held)     Specific Instructions for next treatment: Progressive ROM and open chain hip and knee strengthening. Introduce closed chain with Total gym and/or mini squats and step ups. Treatment Charges: Mins Units   []  Modalities     [x]  Ther Exercise 40 3   []  Manual Therapy     []  Ther Activities     []  Aquatics     [x]  Vasocompression 15 1   []  Other     Total Treatment time 55 4       Assessment: [] Progressing toward goals. Improved complaints of pain, improved gait, improved exercise tolerance. Able to reintroduce closed chain exercises, difficulty with balance exercises bilaterally. [] Other:    [x] Patient would continue to benefit from skilled physical therapy services in order to: He presents with pain, weakness, decreased ROM and functional limitations. He is a good PT candidate to address above mentioned deficits.       STG: (to be met in 6 treatments)  1. ? Pain:2/10  2. ? ROM: 0-125 AROM. Met  3. ? Function:Normalize gait  4. Patient to be independent with home exercise program as demonstrated by performance with correct form without cues.     LTG: (to be met in 12 treatments)  1. Return to work without restrictions  2. Ascend and descend stairs step over step without a rail.                  Patient goals: Improve knee pain and strength, return to work    Pt. Education:  [x] Yes  [] No  [x] Reviewed Prior HEP/Ed  Method of Education: [x] Verbal  [x] Demo  [] Written  Comprehension of Education:  [x] Verbalizes understanding. [] Demonstrates understanding. [x] Needs review. [] Demonstrates/verbalizes HEP/Ed previously given. Plan: [x] Continue current frequency toward long and short term goals. [x] Specific Instructions for subsequent treatments: Progressive ROM and open chain hip and knee strengthening.

## 2021-10-05 ENCOUNTER — HOSPITAL ENCOUNTER (OUTPATIENT)
Dept: PHYSICAL THERAPY | Facility: CLINIC | Age: 56
Setting detail: THERAPIES SERIES
Discharge: HOME | End: 2021-10-05
Payer: COMMERCIAL

## 2021-10-05 PROCEDURE — 97016 VASOPNEUMATIC DEVICE THERAPY: CPT

## 2021-10-05 PROCEDURE — 97110 THERAPEUTIC EXERCISES: CPT

## 2021-10-05 NOTE — FLOWSHEET NOTE
[] [facility]) - [facility] C[facility] &  Therapy  [address]  [phone]  F: [phone] [] [address]  [place] [address]   [address]  P: [phone]  F: [phone] [] [address]  Therapy  [address]  P: [phone]  F: [phone] [x] [address]  P: [phone]  F: [phone] [] [address]  [facility]   [address]   [place]: [phone]  F: [phone]      Physical Therapy Daily Treatment Note    Date:  10/5/2021  Patient Name:  Luis Maya    :  1965  MRN: [id number]  Physician: Taty Gorman DO                                   Insurance: MM (10% co-ins)  Medical Diagnosis: Left medial meniscus tear                      Rehab Codes: S83.242D  Onset date: 21                 Next 's appt.: 9/10/21  Visit# / total visits:     Cancels/No Shows: 0/0  Subjective:    Pain:  [x] Yes  [] No Location: L knee Pain Rating: (0-10 scale) 3/10  Pain altered Tx:  [] No  [] Yes  Action:  Comments: Pt states his knee pain is less this week than last.  He dd some walking over the weekend but didn't over do it.     Objective:  Modalities: Vasopneumatic cold with compression x15', 36 deg mod compression  Precautions:  Exercises:  Exercise Reps/ Time Weight/ Level Comments    Bike ROM       Nu-step 10' L4  x   Mat       Ball knee flexion roll 2x15     x   SLR x15 5#   x   LAQ 2x15  5#   x   Bridge 2x10  Ball   x   Clam x15        Sidelying hip abduction x15     x    prone quad stretch x30\"    with strap x   Standing          TKE 1 min      Total gym squats 2x15 L16     Calf raise 3x15   x   TRX squat    x   Step ups 3x10 4\" L bias, Slow and controlled x   Forward reach    x   S/L Press 3x10 10/20#  x   Functional       Gait 6x20'  Cues to not drop shoulder and make stance time equal x   Heel pause walk 2x20'      Toe pause walk 2x20\"      marches 2x20'       Other:   Manual: Supine retrograde massage with elevation, MFR to quads (held)     Specific Instructions for next treatment: Progressive ROM and open chain hip and knee strengthening. Introduce closed chain with Total gym and/or mini squats and step ups. Treatment Charges: Mins Units   []  Modalities     [x]  Ther Exercise 43 3   []  Manual Therapy     []  Ther Activities     []  Aquatics     [x]  Vasocompression 15 1   []  Other     Total Treatment time 58 4       Assessment: [] Progressing toward goals. Improved complaints of pain, improved gait, improved exercise tolerance. Improved tolerance to closed chain exercises. Able to normalize gait with verbal cues, he tends to walk with his knee held in excessive flexion. Will focus on progressing closed chain strength, balance and gait. [] Other:    [x] Patient would continue to benefit from skilled physical therapy services in order to: He presents with pain, weakness, decreased ROM and functional limitations. He is a good PT candidate to address above mentioned deficits.       STG: (to be met in 6 treatments)  1. ? Pain:2/10  2. ? ROM: 0-125 AROM. Met  3. ? Function:Normalize gait  4. Patient to be independent with home exercise program as demonstrated by performance with correct form without cues.     LTG: (to be met in 12 treatments)  1. Return to work without restrictions  2. Ascend and descend stairs step over step without a rail.                  Patient goals: Improve knee pain and strength, return to work    Pt. Education:  [x] Yes  [] No  [x] Reviewed Prior HEP/Ed  Method of Education: [x] Verbal  [x] Demo  [] Written  Comprehension of Education:  [x] Verbalizes understanding. [] Demonstrates understanding. [x] Needs review. [] Demonstrates/verbalizes HEP/Ed previously given.      Plan: [x] Continue current frequency toward long and short term goals. [x] Specific Instructions for subsequent treatments: Progressive ROM and open chain hip and knee strengthening.   Introduce closed chain with Total gym and/or mini squats and step ups.         Time In: 9:00am           Time Out: 10:00am    Electronically signed by:  Heydi Quan PT

## 2021-10-08 ENCOUNTER — HOSPITAL ENCOUNTER (OUTPATIENT)
Dept: PHYSICAL THERAPY | Facility: CLINIC | Age: 56
Setting detail: THERAPIES SERIES
Discharge: HOME | End: 2021-10-08
Payer: COMMERCIAL

## 2021-10-08 PROCEDURE — 97016 VASOPNEUMATIC DEVICE THERAPY: CPT

## 2021-10-08 PROCEDURE — 97110 THERAPEUTIC EXERCISES: CPT

## 2021-10-08 NOTE — FLOWSHEET NOTE
[] [facility]) [facility] CENTER &  Therapy  [address]  [phone]  F: [phone] [] [address]  [place] [address]   [address]  P: [phone]  F: [phone] [] [address]  Therapy  [address]  P: [phone]  F: [phone] [x] [address]  P: [phone]  F: [phone] [] [address]  [facility]   [address]   [place]: [phone]  F: [phone]      Physical Therapy Daily Treatment Note    Date:  10/8/2021  Patient Name:  Zion Andrew    :  1965  MRN: [id number]  Physician: Chelsea Spangler DO                                   [place] [facility] [place]  Medical Diagnosis: Left medial meniscus tear                      Rehab Codes: S83.242D  Onset date: 21                 Next 's appt.: 9/10/21  Visit# / total visits:     Cancels/No Shows: 0/0  Subjective:    Pain:  [x] Yes  [] No Location: L knee Pain Rating: (0-10 scale) 3/10  Pain altered Tx:  [] No  [] Yes  Action:  Comments: Pt reported that he has some stiffness in the morning but it loosens up through out the day.      Objective:  Modalities: Vasopneumatic cold with compression x15', 36 deg mod compression  Precautions:  Exercises:  Exercise Reps/ Time Weight/ Level Comments    Bike ROM       Nu-step 10' L4  x   Mat       Ball knee flexion roll 2x15     x   SLR x15 5#   x   LAQ 2x15  5#   x   Bridge 2x10  Ball   x   Clam x15        Sidelying hip abduction x15     x    prone quad stretch x30\"    with strap x   Standing          TKE 1 min      Total gym squats 2x15 L16  x   Calf raise 3x15   x   TRX squat       Step ups 3x10 4\" L bias, Slow and controlled x   Forward reach       S/L Press 3x10 10/20#  x   Functional       Gait 6x20'  Cues to not drop shoulder and make stance time equal    Heel pause walk 2x20'   x   Toe pause walk 2x20\"   x   marches 2x20'    x   Other:   Manual: Supine retrograde massage with elevation, MFR to quads (held)     Specific Instructions for next treatment: Progressive ROM and open chain hip and knee strengthening. Introduce closed chain with Total gym and/or mini squats and step ups. Treatment Charges: Mins Units   []  Modalities     [x]  Ther Exercise 40 3   []  Manual Therapy     []  Ther Activities     []  Aquatics     [x]  Vasocompression 15 1   []  Other     Total Treatment time 55 4       Assessment: [] Progressing toward goals. Pt with good tolerance to all progressions made last session held further progressions today due to pt noting increased soreness after getting off the bike. Pt in need of cues for proper gait mechanics, to not hold L knee in excessive flexion. Pt concluded treatment with vasocompression. [] Other:    [x] Patient would continue to benefit from skilled physical therapy services in order to: He presents with pain, weakness, decreased ROM and functional limitations. He is a good PT candidate to address above mentioned deficits.       STG: (to be met in 6 treatments)  1. ? Pain:2/10  2. ? ROM: 0-125 AROM. Met  3. ? Function:Normalize gait  4. Patient to be independent with home exercise program as demonstrated by performance with correct form without cues.     LTG: (to be met in 12 treatments)  1. Return to work without restrictions  2. Ascend and descend stairs step over step without a rail.                  Patient goals: Improve knee pain and strength, return to work    Pt. Education:  [x] Yes  [] No  [x] Reviewed Prior HEP/Ed  Method of Education: [x] Verbal  [x] Demo  [] Written  Comprehension of Education:  [x] Verbalizes understanding. [] Demonstrates understanding. [x] Needs review. [] Demonstrates/verbalizes HEP/Ed previously given.      Plan: [x] Continue current frequency toward long and short term goals. [x] Specific Instructions for subsequent treatments: Progressive ROM and open chain hip and knee strengthening.   Introduce closed chain with Total gym and/or mini squats and step ups.         Time In: 0900           Time Out: 1005    Electronically signed by:  Preeti Avila PTA

## 2021-10-12 ENCOUNTER — HOSPITAL ENCOUNTER (OUTPATIENT)
Dept: PHYSICAL THERAPY | Facility: CLINIC | Age: 56
Setting detail: THERAPIES SERIES
Discharge: HOME | End: 2021-10-12
Payer: COMMERCIAL

## 2021-10-12 PROCEDURE — 97110 THERAPEUTIC EXERCISES: CPT

## 2021-10-12 PROCEDURE — 97016 VASOPNEUMATIC DEVICE THERAPY: CPT

## 2021-10-12 NOTE — FLOWSHEET NOTE
[] [facility]) [facility] CENTER &  Therapy  [address]  [phone]  F: [phone] [] [address]  [place] [address]   [address]  P: [phone]  F: [phone] [] [address]  Therapy  [address]  P: [phone]  F: [phone] [x] [address]  P: [phone]  F: [phone] [] [address]  [facility]   [address]   [place]: [phone]  F: [phone]      Physical Therapy Daily Treatment Note    Date:  10/12/2021  Patient Name:  Willy Boeck    :  1965  MRN: [id number]  Physician: Miguel Angel Rojas DO                                   Insurance: [facility] [place] (10% co-ins)  Medical Diagnosis: Left medial meniscus tear                      Rehab Codes: S83.242D  Onset date: 21                 Next 's appt.: 9/10/21  Visit# / total visits: 10/12    Cancels/No Shows: 0/0    Subjective:    Pain:  [x] Yes  [] No Location: L knee Pain Rating: (0-10 scale) 3/10  Pain altered Tx:  [] No  [] Yes  Action:    Comments: Pt reported that he still has some knee pain but he is doing more. He has knee pain if his knee is completely straight.     Objective:  Modalities: Vasopneumatic cold with compression x15', 36 deg mod compression  Precautions:  Exercises:  Exercise Reps/ Time Weight/ Level Comments    Bike ROM       Nu-step 10' L4  x   Mat       Ball knee flexion roll 2x15     x   SLR x15 5#      LAQ 2x15  5#      Bridge 2x10  Ball      Clam x15        Sidelying hip abduction x15     x    prone quad stretch x30\"    with strap x   Standing          TKE 3x20      Total Gym single leg 3x10 L16  x   Total gym squats 3x15 L16  x   Calf raise 3x15   x   TRX squat 2x10   x   Step ups 3x10 6\" L bias, Slow and controlled x   Forward reach       S/L Press 3x10 10/20#     Functional       Gait 6x20'  Cues to not drop shoulder and make stance time equal    Heel pause walk 2x20'   x   Toe pause walk 2x20\"      marches 2x20'    x   Other:   Manual: Supine retrograde massage with elevation, MFR to quads (held)     Specific Instructions for next treatment: Progressive ROM and open chain hip and knee strengthening. Introduce closed chain with Total gym and/or mini squats and step ups. Treatment Charges: Mins Units   []  Modalities     [x]  Ther Exercise 40 3   []  Manual Therapy     []  Ther Activities     []  Aquatics     [x]  Vasocompression 15 1   []  Other     Total Treatment time 55 4       Assessment: [] Progressing toward goals. Pt continues to have moderate pain rated at 4/10 with ambulation. His knee ROM and strength have improved, but his pain,  gait and function continue to be below expectations. He keeps his knee in a flexed position through midstance and has reduced stance time on the left. He is able to improve his gait with verbal cues but reverts to antalgic patten when no cued. The patient returns to Dr. Divina Otoole at the end of the week. [] Other:    [x] Patient would continue to benefit from skilled physical therapy services in order to: He presents with pain, weakness, decreased ROM and functional limitations. He is a good PT candidate to address above mentioned deficits.       STG: (to be met in 6 treatments)  1. ? Pain:2/10. Not Met  2. ? ROM: 0-125 AROM. Met  3. ? Function:Normalize gait. Not Met  4. Patient to be independent with home exercise program as demonstrated by performance with correct form without cues.     LTG: (to be met in 12 treatments)  1. Return to work without restrictions. Not Met  2. Ascend and descend stairs step over step without a rail. Not Met                 Patient goals: Improve knee pain and strength, return to work    Pt.  Education:  [x] Yes  [] No  [x] Reviewed Prior HEP/Ed  Method of Education: [x] Verbal  [x] Demo  [] Written  Comprehension of Education:  [x] Verbalizes understanding. [] Demonstrates understanding. [x] Needs review. [] Demonstrates/verbalizes HEP/Ed previously given. Plan: [x] Continue current frequency toward long and short term goals. [x] Specific Instructions for subsequent treatments: Progressive ROM and open chain hip and knee strengthening.   Introduce closed chain with Total gym and/or mini squats and step ups.         Time In: 0900           Time Out: 1005    Electronically signed by:  Brenden Clinton PT

## 2021-10-12 NOTE — PROGRESS NOTES
[] [facility]) [facility]  [address]  [phone]  F: [phone] [] [address]  [address]   [address]  P: [phone]  F: [phone] [] [address] &  Therapy  [address]  P: [phone]  F: [phone] [x] [address]  P: [phone]  F: [phone] [] [address]  [facility]   [address]   [place]: [phone]  F: [phone]      Physical Therapy Progress Note    Date: 10/12/2021      Patient: Luis Maya  : 1965  MRN: [id number]    Physician: Taty Gorman DO                                   Insurance: [employer] (10% co-ins)  Medical Diagnosis: Left medial meniscus tear                      Rehab Codes: S83.242D  Onset date: 21                 Next 's appt.: 9/10/21  Visit# / total visits: 10/12    Cancels/No Shows: 0/0    Date range of services: 21 to 10/12/21      Subjective:  Subjective:    Pain:  [x] Yes  [] No Location: L knee Pain Rating: (0-10 scale) 3/10  Pain altered Tx:  [] No  [] Yes  Action:    Comments: Pt reported that he still has some knee pain but he is doing more. He has knee pain if his knee is completely straight. Objective:  Test Measurements: AROM 0-130 degrees, Knee Ext 4+/5, Knee flexion 5/5  Function: LEFS 38/80,  53% functionally impaired    Assessment: [] Progressing toward goals. Pt continues to have moderate pain rated at 4/10 with ambulation. His knee ROM and strength have improved, but his pain,  gait and function continue to be below expectations. He keeps his knee in a flexed position through midstance and has reduced stance time on the left. He is able to improve his gait with verbal cues but reverts to antalgic patten when no cued.   The patient returns to Dr. Navya Stewart at the end of the week. [] Other:    [x] Patient would continue to benefit from skilled physical therapy services in order to: He presents with pain, weakness, decreased ROM and functional limitations. He is a good PT candidate to address above mentioned deficits. STG: (to be met in 6 treatments)  1. ? Pain:2/10. Not Met  2. ? ROM: 0-125 AROM. Met  3. ? Function:Normalize gait. Not Met  4. Patient to be independent with home exercise program as demonstrated by performance with correct form without cues.     LTG: (to be met in 12 treatments)  1. Return to work without restrictions. Not Met  2. Ascend and descend stairs step over step without a rail. Not Met      Treatment Plan:  [x] Therapeutic Exercise   98649  [] Iontophoresis: 4 mg/mL Dexamethasone Sodium Phosphate  mAmin  91489   [x] Therapeutic Activity  40482 [x] Vasopneumatic cold with compression  60919    [x] Gait Training   54183 [] Ultrasound   09595   [x] Neuromuscular Re-education  98160 [] Electrical Stimulation Unattended  33429   [x] Manual Therapy  39748 [] Electrical Stimulation Attended  07194   [x] Instruction in HEP  [] Lumbar/Cervical Traction  41505   [] Aquatic Therapy   72894 [] Cold/hotpack    [] Massage   47985      [] Dry Needling, 1 or 2 muscles  33605   [] Biofeedback, first 15 minutes   92179  [] Biofeedback, additional 15 minutes   66354 [] Dry Needling, 3 or more muscles  62039       Patient Status:     [x] Continue per initial plan of care. [] Additional visits necessary. [] Other:     Requested Frequency/Duration:2 times per week for 4 treatments. Electronically signed by Triston Topete PT on 10/12/2021 at 12:51 PM      If you have any questions or concerns, please don't hesitate to call.   Thank you for your referral.    Physician Signature:________________________________Date:__________________  By signing above or cosigning this note, I have reviewed this plan of care and

## 2021-10-15 ENCOUNTER — HOSPITAL ENCOUNTER (OUTPATIENT)
Dept: PHYSICAL THERAPY | Facility: CLINIC | Age: 56
Setting detail: THERAPIES SERIES
Discharge: HOME | End: 2021-10-15
Payer: COMMERCIAL

## 2021-10-15 ENCOUNTER — HOSPITAL ENCOUNTER (OUTPATIENT)
Age: 56
Setting detail: SPECIMEN
Discharge: HOME | End: 2021-10-15
Payer: COMMERCIAL

## 2021-10-15 LAB
ALBUMIN SERPL-MCNC: 4.5 G/DL (ref 3.5–5.2)
ALBUMIN/GLOBULIN RATIO: 2 (ref 1–2.5)
ALP BLD-CCNC: 58 U/L (ref 40–129)
ALT SERPL-CCNC: 40 U/L (ref 5–41)
ANION GAP SERPL CALCULATED.3IONS-SCNC: 15 MMOL/L (ref 9–17)
AST SERPL-CCNC: 26 U/L
BILIRUB SERPL-MCNC: 0.44 MG/DL (ref 0.3–1.2)
BUN BLDV-MCNC: 12 MG/DL (ref 6–20)
BUN/CREAT BLD: ABNORMAL (ref 9–20)
CALCIUM SERPL-MCNC: 9.3 MG/DL (ref 8.6–10.4)
CHLORIDE BLD-SCNC: 106 MMOL/L (ref 98–107)
CHOLESTEROL/HDL RATIO: 4.6
CHOLESTEROL: 151 MG/DL
CO2: 20 MMOL/L (ref 20–31)
CREAT SERPL-MCNC: 0.59 MG/DL (ref 0.7–1.2)
ESTIMATED AVERAGE GLUCOSE: 105 MG/DL
GFR AFRICAN AMERICAN: >60 ML/MIN
GFR NON-AFRICAN AMERICAN: >60 ML/MIN
GFR SERPL CREATININE-BSD FRML MDRD: ABNORMAL ML/MIN/{1.73_M2}
GFR SERPL CREATININE-BSD FRML MDRD: ABNORMAL ML/MIN/{1.73_M2}
GLUCOSE BLD-MCNC: 95 MG/DL (ref 70–99)
HBA1C MFR BLD: 5.3 % (ref 4–6)
HCT VFR BLD CALC: 43.8 % (ref 40.7–50.3)
HDLC SERPL-MCNC: 33 MG/DL
HEMOGLOBIN: 14.3 G/DL (ref 13–17)
IRON: 81 UG/DL (ref 59–158)
LDL CHOLESTEROL: 81 MG/DL (ref 0–130)
MCH RBC QN AUTO: 29.5 PG (ref 25.2–33.5)
MCHC RBC AUTO-ENTMCNC: 32.6 G/DL (ref 28.4–34.8)
MCV RBC AUTO: 90.3 FL (ref 82.6–102.9)
NRBC AUTOMATED: 0 PER 100 WBC
PDW BLD-RTO: 12.9 % (ref 11.8–14.4)
PLATELET # BLD: 258 K/UL (ref 138–453)
PMV BLD AUTO: 9.8 FL (ref 8.1–13.5)
POTASSIUM SERPL-SCNC: 4.7 MMOL/L (ref 3.7–5.3)
RBC # BLD: 4.85 M/UL (ref 4.21–5.77)
SODIUM BLD-SCNC: 141 MMOL/L (ref 135–144)
TOTAL PROTEIN: 6.8 G/DL (ref 6.4–8.3)
TRIGL SERPL-MCNC: 184 MG/DL
URIC ACID: 5.7 MG/DL (ref 3.4–7)
VITAMIN B-12: 642 PG/ML (ref 232–1245)
VITAMIN D 25-HYDROXY: 37.5 NG/ML (ref 30–100)
VLDLC SERPL CALC-MCNC: ABNORMAL MG/DL (ref 1–30)
WBC # BLD: 8.5 K/UL (ref 3.5–11.3)

## 2021-10-15 PROCEDURE — 97016 VASOPNEUMATIC DEVICE THERAPY: CPT

## 2021-10-15 PROCEDURE — 97110 THERAPEUTIC EXERCISES: CPT

## 2021-10-15 NOTE — FLOWSHEET NOTE
Gait 6x20'  Cues to not drop shoulder and make stance time equal    Heel pause walk 2x20'   x   Toe pause walk 2x20\"      marches 2x20'    x   Other:   Manual: Supine retrograde massage with elevation, MFR to quads (held)     Specific Instructions for next treatment: Progressive ROM and open chain hip and knee strengthening. Introduce closed chain with Total gym and/or mini squats and step ups. Treatment Charges: Mins Units   []  Modalities     [x]  Ther Exercise 45 3   []  Manual Therapy     []  Ther Activities     []  Aquatics     [x]  Vasocompression 15 1   []  Other     Total Treatment time 60 4       Assessment: [] Progressing toward goals Pt continues to display decreased stance time on left during gait. Pt corrects but quickly will revert back to decreased stance time. No progressions this date focusing on proper technique. Pt to see doctor on Monday. Pt received vasocompression at the end of the session. [] Other:    [x] Patient would continue to benefit from skilled physical therapy services in order to: He presents with pain, weakness, decreased ROM and functional limitations. He is a good PT candidate to address above mentioned deficits.       STG: (to be met in 6 treatments)  1. ? Pain:2/10. Not Met  2. ? ROM: 0-125 AROM. Met  3. ? Function:Normalize gait. Not Met  4. Patient to be independent with home exercise program as demonstrated by performance with correct form without cues.     LTG: (to be met in 12 treatments)  1. Return to work without restrictions. Not Met  2. Ascend and descend stairs step over step without a rail. Not Met                 Patient goals: Improve knee pain and strength, return to work    Pt. Education:  [x] Yes  [] No  [x] Reviewed Prior HEP/Ed  Method of Education: [x] Verbal  [x] Demo  [] Written  Comprehension of Education:  [x] Verbalizes understanding. [] Demonstrates understanding. [x] Needs review.   [] Demonstrates/verbalizes HEP/Ed previously given.     Plan: [x] Continue current frequency toward long and short term goals. [x] Specific Instructions for subsequent treatments: Progressive ROM and open chain hip and knee strengthening.   Introduce closed chain with Total gym and/or mini squats and step ups.         Time In: 2222           Time Out: 1005    Electronically signed by:  Moise Reid PTA

## 2021-10-18 ENCOUNTER — OFFICE VISIT (OUTPATIENT)
Dept: ORTHOPEDIC SURGERY | Age: 56
End: 2021-10-18

## 2021-10-18 VITALS — BODY MASS INDEX: 40.29 KG/M2 | WEIGHT: 272 LBS | HEIGHT: 69 IN

## 2021-10-18 DIAGNOSIS — M17.12 ARTHRITIS OF LEFT KNEE: Primary | ICD-10-CM

## 2021-10-18 DIAGNOSIS — S83.242D TEAR OF MEDIAL MENISCUS OF LEFT KNEE, CURRENT, UNSPECIFIED TEAR TYPE, SUBSEQUENT ENCOUNTER: ICD-10-CM

## 2021-10-18 PROCEDURE — 99024 POSTOP FOLLOW-UP VISIT: CPT | Performed by: ORTHOPAEDIC SURGERY

## 2021-10-18 ASSESSMENT — ENCOUNTER SYMPTOMS
APNEA: 0
CHEST TIGHTNESS: 0
ABDOMINAL PAIN: 0
VOMITING: 0
COUGH: 0

## 2021-10-18 NOTE — PROGRESS NOTES
[facility]  [facility]  [phone]  Dept: [phone]  Dept Fax: [phone]        Postoperative follow-up note    Subjective:   Valerie Farmer is a 55y.o. year old male who presents to our office today for postoperative followup regarding his   1. Arthritis of left knee    2. Tear of medial meniscus of left knee, current, unspecified tear type, subsequent encounter    . Chief Complaint   Patient presents with    Post-Op Check     Left knee  KNEE ARTHROSCOPY PARTIAL MEDIAL MENISCECTOMY       Valerie Farmer  is a 55y.o. year old male who presents to our office today for postoperative follow up after having undergone a left knee arthroplasty with partial medial menisectomy on 8/31/2021. The patient denies fevers, chills, nausea, vomiting, diarrhea. The patient has started physical therapy. The patient continues to go to formal therapy in [place] and also completes a home exercise program for gait training. Patient mentions that he has been walking and working on his gait. The patient still does have pain but overall feels like he is slowly getting better. The patient has been off of work at this time. He works in the hospital pushing food carts around. He is not sure if he is ready at this time to return to work. Patient has been taking osteo bi-flex and hyaluronics every day for the last few weeks. Review of Systems   Constitutional: Negative for chills and fever. Respiratory: Negative for apnea, cough and chest tightness. Cardiovascular: Negative for chest pain and palpitations. Gastrointestinal: Negative for abdominal pain and vomiting. Genitourinary: Negative for difficulty urinating. Musculoskeletal: Positive for arthralgias (left knee). Negative for gait problem, joint swelling and myalgias. Neurological: Negative for dizziness, weakness and numbness.        I have reviewed the CC, HPI, ROS, PMH, FHX, Social History, and if not present in this note, I have reviewed in the patient's chart. I agree with the documentation provided by other staff and have reviewed their documentation prior to providing my signature indicating agreement. Objective :   General: Avni Pack is a 55 y.o. male who is alert and oriented and sitting comfortably in our office. Ortho Exam  MS:   Full range of motion of the left knee. Mild swelling noted around portal sites but no sign of infection left knee. Calves are supple bilaterally. Motor, sensory, vascular examination of the left lower extremity is grossly intact without focal deficits. Neuro: alert. oriented  Eyes: Extra-ocular muscles intact  Mouth: Oral mucosa moist. No perioral lesions  Pulm: Respirations unlabored and regular. Skin: warm, well perfused  Psych:   Patient has good fund of knowledge and displays understanging of exam, diagnosis, and plan. Radiology:     US HEAD NECK SOFT TISSUE THYROID    Result Date: 9/29/2021  EXAMINATION: HEAD AND NECK SOFT TISSUE ULTRASOUND 9/29/2021 COMPARISON: None. HISTORY: ORDERING SYSTEM PROVIDED HISTORY: Neck mass TECHNOLOGIST PROVIDED HISTORY: Reason for Exam: Lump on posterior neck for a week, has almost gone away. Was not painful Acuity: Acute Type of Exam: Initial Additional signs and symptoms: None Relevant Medical/Surgical History: None Patient reports the nodule has essentially gone away FINDINGS: Direct scanning over an apparently previously palpable lump in the right posterior neck was performed. By ultrasound no discrete mass or fluid collection is seen. Unremarkable exam       Assessment:      1. Arthritis of left knee    2. Tear of medial meniscus of left knee, current, unspecified tear type, subsequent encounter         Plan:      Reviewed arthroscopy photos with the patient today in the office. Discussed with him that some of the pain he is having could be because of mild arthritic changes seen during surgery.  The

## 2021-10-19 ENCOUNTER — HOSPITAL ENCOUNTER (OUTPATIENT)
Dept: PHYSICAL THERAPY | Facility: CLINIC | Age: 56
Setting detail: THERAPIES SERIES
Discharge: HOME | End: 2021-10-19
Payer: COMMERCIAL

## 2021-10-19 LAB — VITAMIN B1 WHOLE BLOOD: 145 NMOL/L (ref 70–180)

## 2021-10-19 PROCEDURE — 97110 THERAPEUTIC EXERCISES: CPT

## 2021-10-19 PROCEDURE — 97016 VASOPNEUMATIC DEVICE THERAPY: CPT

## 2021-10-19 NOTE — FLOWSHEET NOTE
[] [address]  [address]  [phone]  F: [phone] [] [address]  [facility] 36   [address]  P: [phone]  F: [phone] [] [address]  [address] [facility]  P: [phone]  F: [phone] [x] [address]  P: [phone]  F: [phone] [] [address]  [facility]   [address]   [place]: [phone]  F: [phone]      Physical Therapy Daily Treatment Note    Date:  10/19/2021  Patient Name:  Devyn Fields    :  1965  MRN: [id number]  Physician: Katharine Francis DO                                   Insurance: O (10% co-ins)  Medical Diagnosis: Left medial meniscus tear                      Rehab Codes: V75.407T  Onset date: 21                 Next 's appt.: 9/10/21  Visit# / total visits: 12/15    Cancels/No Shows: 0/0    Subjective:    Pain:  [x] Yes  [] No Location: L knee Pain Rating: (0-10 scale) 4/10  Pain altered Tx:  [] No  [] Yes  Action:    Comments: Pt reported that doctor wishes for him to continue therapy for the next two weeks and will return to work the week of .     Objective:  Modalities: Vasopneumatic cold with compression x15', 36 deg mod compression  Precautions:  Exercises:  Exercise Reps/ Time Weight/ Level Comments    Bike ROM       Nu-step 10' L4  x   Mat       Ball knee flexion roll 2x15     x   SLR x15 5#      LAQ 2x15  5#      Bridge 2x10  Ball      Clam x15        Sidelying hip abduction 2x15     x    prone quad stretch 3x30\"    with strap x   Standing          TKE 3x20      Total Gym single leg 3x15 L20  x   Total gym squats 3x15 L20  x   Calf raise 3x15   x   TRX squat 2x15   x   Step ups 3x15 6\" L bias, Slow and controlled x   Forward reach S/L Press 3x10 10/20#     Functional       Gait 6x20'  Cues to not drop shoulder and make stance time equal    Heel pause walk 2x20'   x   Toe pause walk 2x20\"      marches 2x20'    x   Other:   Manual: Supine retrograde massage with elevation, MFR to quads (held)     Specific Instructions for next treatment: Progressive ROM and open chain hip and knee strengthening. Introduce closed chain with Total gym and/or mini squats and step ups. Treatment Charges: Mins Units   []  Modalities     [x]  Ther Exercise 45 3   []  Manual Therapy     []  Ther Activities     []  Aquatics     [x]  Vasocompression 15 1   []  Other     Total Treatment time 60 4       Assessment: [x] Progressing toward goals  Progressed reps for side lying hip abduction, SL total gym, TRX squats and step ups to improve LE strength. Pt with good tolerance to progressions with no increase in pain noted. Pt received vasocompression at end of the session to decrease pain and minor swelling       [] Other:    [x] Patient would continue to benefit from skilled physical therapy services in order to: He presents with pain, weakness, decreased ROM and functional limitations. He is a good PT candidate to address above mentioned deficits.       STG: (to be met in 6 treatments)  1. ? Pain:2/10. Not Met  2. ? ROM: 0-125 AROM. Met  3. ? Function:Normalize gait. Not Met  4. Patient to be independent with home exercise program as demonstrated by performance with correct form without cues.     LTG: (to be met in 12 treatments)  1. Return to work without restrictions. Not Met  2. Ascend and descend stairs step over step without a rail. Not Met                 Patient goals: Improve knee pain and strength, return to work    Pt. Education:  [x] Yes  [] No  [x] Reviewed Prior HEP/Ed  Method of Education: [x] Verbal  [x] Demo  [] Written  Comprehension of Education:  [x] Verbalizes understanding. [] Demonstrates understanding. [x] Needs review.   [] Demonstrates/verbalizes HEP/Ed previously given. Plan: [x] Continue current frequency toward long and short term goals. [x] Specific Instructions for subsequent treatments: Progressive ROM and open chain hip and knee strengthening.   Introduce closed chain with Total gym and/or mini squats and step ups.         Time In: 0900           Time Out: 1010    Electronically signed by:  Kranthi Coto PTA

## 2021-10-22 ENCOUNTER — HOSPITAL ENCOUNTER (OUTPATIENT)
Dept: PHYSICAL THERAPY | Facility: CLINIC | Age: 56
Setting detail: THERAPIES SERIES
Discharge: HOME | End: 2021-10-22
Payer: COMMERCIAL

## 2021-10-22 PROCEDURE — 97110 THERAPEUTIC EXERCISES: CPT

## 2021-10-22 NOTE — FLOWSHEET NOTE
[] [address]  [address]  [phone]  F: [phone] [] [address]  [place] [address]   [address]  P: [phone]  F: [phone] [] [address]  Therapy  [address] [facility]  P: [phone]  F: [phone] [x] [address]  P: [phone]  F: [phone] [] [address]  [facility]   [address]   [place]: [phone]  F: [phone]      Physical Therapy Daily Treatment Note    Date:  10/22/2021  Patient Name:  Avni Pack    :  1965  MRN: [id number]  Physician: Angela Camarena DO                                   Insurance: [facility] (10% co-ins)  Medical Diagnosis: Left medial meniscus tear                      Rehab Codes: S83.242D  Onset date: 21                 Next 's appt.: 9/10/21  Visit# / total visits: 12/15    Cancels/No Shows: 0/0    Subjective:    Pain:  [x] Yes  [] No Location: L knee Pain Rating: (0-10 scale) 4/10  Pain altered Tx:  [] No  [] Yes  Action:    Comments: Pt reported that doctor wishes for him to continue therapy for the next two weeks and will return to work the week of .     Objective:  Modalities: Vasopneumatic cold with compression x15', 36 deg mod compression  Precautions:  Exercises:  Exercise Reps/ Time Weight/ Level Comments    Bike ROM       Nu-step 10' L4  x   Mat       Ball knee flexion roll 2x15     x   SLR x15 5#      LAQ 2x15  5#      Bridge 2x10  Ball      Clam x15        Sidelying hip abduction 3x15     x    prone quad stretch 3x30\"    with strap x   Standing          TKE 3x20      Total Gym single leg 3x15 L22  x   Total gym squats 3x15 L22  x   Calf raise 3x15   x   TRX squat 2x15   x   Step ups 3x15 8\" L bias, Slow and controlled x   Forward reach S/L Press 3x10 10/20#     Functional       Gait 6x20'  Cues to not drop shoulder and make stance time equal    Heel pause walk 2x20'   x   Toe pause walk 2x20\"      marches 2x20'    x   Other:   Manual: Supine retrograde massage with elevation, MFR to quads (held)     Specific Instructions for next treatment:     Treatment Charges: Mins Units   []  Modalities     [x]  Ther Exercise 47 3   []  Manual Therapy     []  Ther Activities     []  Aquatics     [x]  Vasocompression     []  Other     Total Treatment time 47 3       Assessment: [x] Progressing toward goals  Increased step height to improve LE strength and stability. Progressed reps for side lying hip abduction to increase hip strength. Improved height for total gym to improve hamstring strength. Trial of pushing cart to mimic work conditions with good tolerance. Pt declined vasocompression stating that he felt good and wanted to try not using vaso today. [] Other:    [x] Patient would continue to benefit from skilled physical therapy services in order to: He presents with pain, weakness, decreased ROM and functional limitations. He is a good PT candidate to address above mentioned deficits.       STG: (to be met in 6 treatments)  1. ? Pain:2/10. Not Met  2. ? ROM: 0-125 AROM. Met  3. ? Function:Normalize gait. Not Met  4. Patient to be independent with home exercise program as demonstrated by performance with correct form without cues.     LTG: (to be met in 12 treatments)  1. Return to work without restrictions. Not Met  2. Ascend and descend stairs step over step without a rail. Not Met                 Patient goals: Improve knee pain and strength, return to work    Pt. Education:  [x] Yes  [] No  [x] Reviewed Prior HEP/Ed  Method of Education: [x] Verbal  [x] Demo  [] Written  Comprehension of Education:  [x] Verbalizes understanding. [] Demonstrates understanding. [x] Needs review.   [] Demonstrates/verbalizes HEP/Ed previously given.     Plan: [x] Continue current frequency toward long and short term goals. [x] Specific Instructions for subsequent treatments: Progressive ROM and open chain hip and knee strengthening.   Introduce closed chain with Total gym and/or mini squats and step ups.         Time In: 0900           Time Out: 0957    Electronically signed by:  Ama Rdz PTA

## 2021-10-26 ENCOUNTER — HOSPITAL ENCOUNTER (OUTPATIENT)
Dept: PHYSICAL THERAPY | Facility: CLINIC | Age: 56
Setting detail: THERAPIES SERIES
Discharge: HOME | End: 2021-10-26
Payer: COMMERCIAL

## 2021-10-26 PROCEDURE — 97110 THERAPEUTIC EXERCISES: CPT

## 2021-10-26 NOTE — FLOWSHEET NOTE
[] [facility]) - [facility]  Therapy  [address]  [phone]  F: [phone] [] [address]  [place] [address]   [address]  P: [phone]  F: [phone] [] [place]  [address]  P: [phone]  F: [phone] [x] [address]  P: [phone]  F: [phone] [] [address]  [facility]   [address]   [place]: [phone]  F: [phone]      Physical Therapy Daily Treatment Note    Date:  10/26/2021  Patient Name:  Mir Antony    :  1965  MRN: [id number]  Physician: Zev Quintanilla DO                                   Insurance: [facility] (10% co-ins)  Medical Diagnosis: Left medial meniscus tear                      Rehab Codes: S83.242D  Onset date: 21                 Next 's appt.: 9/10/21  Visit# / total visits: 14/15    Cancels/No Shows: 0/0    Subjective:    Pain:  [x] Yes  [] No Location: L knee Pain Rating: (0-10 scale) 4/10  Pain altered Tx:  [] No  [] Yes  Action:    Comments: Pt reported no change in pain level some soreness.       Objective:  Modalities: Vasopneumatic cold with compression x15', 36 deg mod compression  Precautions:  Exercises:  Exercise Reps/ Time Weight/ Level Comments    Bike ROM       Nu-step 10' L4  x   Mat       Ball knee flexion roll 2x15     x   SLR x15 5#      LAQ 2x15  5#      Bridge 2x10  Ball      Clam x15        Sidelying hip abduction 3x15     x    prone quad stretch 3x30\"    with strap x   Standing          TKE 3x20      Total Gym single leg 3x15 L22  x   Total gym squats 3x15 L22  x   Calf raise 3x15   x   TRX squat 2x15   x   Step ups 3x15 8\" L bias, Slow and controlled x   Forward reach       S/L Press 3x10 10/20#     Functional       Gait 6x20'  Cues to not Instructions for subsequent treatments: prep for returning to work.          Time In: 9367           Time Out: 8674    Electronically signed by:  Dustin Berry PTA

## 2021-10-29 ENCOUNTER — HOSPITAL ENCOUNTER (OUTPATIENT)
Dept: PHYSICAL THERAPY | Facility: CLINIC | Age: 56
Setting detail: THERAPIES SERIES
Discharge: HOME | End: 2021-10-29
Payer: COMMERCIAL

## 2021-10-29 PROCEDURE — 97110 THERAPEUTIC EXERCISES: CPT

## 2021-11-03 ENCOUNTER — OFFICE VISIT (OUTPATIENT)
Dept: ORTHOPEDIC SURGERY | Age: 56
End: 2021-11-03

## 2021-11-03 VITALS — WEIGHT: 272 LBS | RESPIRATION RATE: 16 BRPM | HEIGHT: 69 IN | BODY MASS INDEX: 40.29 KG/M2

## 2021-11-03 DIAGNOSIS — S83.242D TEAR OF MEDIAL MENISCUS OF LEFT KNEE, CURRENT, UNSPECIFIED TEAR TYPE, SUBSEQUENT ENCOUNTER: ICD-10-CM

## 2021-11-03 DIAGNOSIS — M17.12 ARTHRITIS OF LEFT KNEE: Primary | ICD-10-CM

## 2021-11-03 PROCEDURE — 99024 POSTOP FOLLOW-UP VISIT: CPT | Performed by: ORTHOPAEDIC SURGERY

## 2021-11-03 RX ORDER — METHYLPREDNISOLONE 4 MG/1
TABLET ORAL
Qty: 1 KIT | Refills: 0 | Status: SHIPPED | OUTPATIENT
Start: 2021-11-03 | End: 2021-11-09

## 2021-11-03 ASSESSMENT — ENCOUNTER SYMPTOMS
COUGH: 0
DIARRHEA: 0
CONSTIPATION: 0
NAUSEA: 0

## 2021-11-03 NOTE — PROGRESS NOTES
[address] [facility]  [address] [facility]  [address] [address]  Dept: [phone]  Dept Fax: [phone]        Postoperative follow-up note    Subjective:   Suzan Hernandez is a 55y.o. year old male who presents to our office today for postoperative followup regarding his   1. Arthritis of left knee    2. Tear of medial meniscus of left knee, current, unspecified tear type, subsequent encounter    . Chief Complaint   Patient presents with    Follow-up     left knee     Suzan Hernandez  is a 55y.o. year old male who presents to our office today for postoperative follow up after having undergone a left knee arthroscopy with partial medial menisectomy on 8/31/2021. Patient went back to work this past Monday. He says after working Monday and Tuesday he has has increased pain to his left knee. Patient says his gait is now different after the surgery at work. Patient walks with a limp and has to propel himself with his right leg. Patient has finished physical therapy. Patient had a corticosteroid injection in surgery. Patient said at this time he feels like its hard to function with his left knee and he wants to consider knee arthroplasty. Review of Systems   Constitutional: Negative for chills and fever. Respiratory: Negative for cough. Gastrointestinal: Negative for constipation, diarrhea and nausea. Musculoskeletal: Positive for arthralgias (left knee). Negative for gait problem, joint swelling and myalgias. Neurological: Negative for dizziness, weakness and numbness. I have reviewed the CC, HPI, ROS, PMH, FHX, Social History, and if not present in this note, I have reviewed in the patient's chart. I agree with the documentation provided by other staff and have reviewed their documentation prior to providing my signature indicating agreement.     Objective :   General: Suzan Hernandez is a 55 y.o. male who is alert and oriented and sitting comfortably in our office. Ortho Exam  MS:   Evaluation of the Left knee reveals no significant outward deformity. There is no erythema, warmth, skin lesions, signs of infection. There is tenderness over the lateral joint line. There is a mildknee effusion. Range of motion of the Left knee is  full. No instability of the knee is appreciated at 0 and 30° of flexion. There is a negative anterior drawer Lachman's test.  There is increased pain with varus Melissa's testing. No calf tenderness is noted. There is a negative hip log roll and Stinchfield test.  Motor, sensory, vascular examination to the Left lower extremity is intact. Patient has full range of motion of the ankle. Neuro: alert. oriented  Eyes: Extra-ocular muscles intact  Mouth: Oral mucosa moist. No perioral lesions  Pulm: Respirations unlabored and regular. Skin: warm, well perfused  Psych:   Patient has good fund of knowledge and displays understanging of exam, diagnosis, and plan. Radiology:     Assessment:      1. Arthritis of left knee    2. Tear of medial meniscus of left knee, current, unspecified tear type, subsequent encounter         Plan:       Long discussion with patient that only being 2 months from a left knee arthroscopy that it is too early in his recovery to see the full benefits of the arthroscopy and too early to consider a left knee replacement. Discussed multiple options going forward with his left knee pain. Will send patient in a medrol dose pack. Will send in a new physical therapy prescription. Will take patient out of work until 11/22/2021 so he can rest and work on his therapy. Will precert for Euflexxa injections. Will prescribe a medial  brace. Will see patient back in 4 weeks or if euflexxa injections get approved before then. Follow up in four weeks. Follow up: Return in about 4 weeks (around 12/1/2021) for re- evaluation.     Orders Placed This Encounter   Medications  methylPREDNISolone (MEDROL DOSEPACK) 4 MG tablet     Sig: Take by mouth. Dispense:  1 kit     Refill:  0       Orders Placed This Encounter   Procedures   [facility] Physical [facility]     Referral Priority:   Routine     Referral Type:   Eval and Treat     Referral Reason:   Specialty Services Required     Requested Specialty:   Physical Therapy     Number of Visits Requested:   1     I, Davie Conde RN am scribing for and in the presence of Dr. Gray Pinto  11/6/2021 10:25 AM      I have reviewed and made changes accordingly to the work scribed by Davie Conde RN. The documentation accurately reflects work and decisions made by me. I have also reviewed documentation completed by clinical staff.     Gray Pinto DO, [facility]  11/6/2021 10:25 AM  This note is created with the assistance of a speech recognition program.  While intending to generate a document that actually reflects the content of the visit, the document can still have some errors including those of syntax and sound a like substitutions which may escape proof reading.  In such instances, actual meaning can be extrapolated by contextual diversion      Electronically signed by Ian Thomas DO, ANDREW on 11/6/2021 at 10:25 AM

## 2021-11-04 ENCOUNTER — HOSPITAL ENCOUNTER (OUTPATIENT)
Dept: PHYSICAL THERAPY | Facility: CLINIC | Age: 56
Setting detail: THERAPIES SERIES
Discharge: HOME | End: 2021-11-04
Payer: COMMERCIAL

## 2021-11-04 DIAGNOSIS — M17.12 ARTHRITIS OF LEFT KNEE: Primary | ICD-10-CM

## 2021-11-04 PROCEDURE — 97016 VASOPNEUMATIC DEVICE THERAPY: CPT

## 2021-11-04 PROCEDURE — 97110 THERAPEUTIC EXERCISES: CPT

## 2021-11-04 RX ORDER — HYALURONATE SODIUM 10 MG/ML
SYRINGE (ML) INTRAARTICULAR
Qty: 6 ML | Refills: 0 | Status: SHIPPED | OUTPATIENT
Start: 2021-11-04

## 2021-11-04 NOTE — PROGRESS NOTES
[] [facility]) - [facility] &  Therapy  [address]  [phone]  F: [phone] [] [address]  [place] [address]   [address]  P: [phone]  F: [phone] [] [address]  Therapy  [address]  P: [phone]  F: [phone] [x] [address]  P: [phone]  F: [phone] [] [address]  [facility]   [address]   [place]: [phone]  F: [phone]      Physical Therapy Progress Report    Date:  2021  Patient Name:  Thor Arce    :  1965  MRN: [id number]  Physician: Steve Umaña DO                                   [place] [facility] [place]10[place])  Medical Diagnosis: Left medial meniscus tear                      Rehab Codes: S83.242D  Onset date: 21                 Next 's appt.: 9/10/21  Visit# / total visits:     Cancels/No Shows: 0/0    Subjective:    Pain:  [x] Yes  [] No Location: L knee Pain Rating: (0-10 scale) 4/10  Pain altered Tx:  [] No  [] Yes  Action:    Comments: Pt reports that he returned to work for 2 days and had increased calf and knee pain. He saw Dr. Harjeet Dye who is keeping him out of work until 21 and prescribed a steroid dose pack. He also plans to do a Synvisc injection. Objective:  AROM: 0-130  MMT Knee flex: 5/5, Knee Ext: 4+/5  Gait: Patient ambulates holding his right knee in excessive flexion during stance phase.       Modalities: Vasopneumatic cold with compression x15', 36 deg mod compression  Precautions:  Exercises:  Exercise Reps/ Time Weight/ Level Comments    Bike ROM       Nu-step 10' L4  x   Mat       Ball knee flexion roll 3x15     x   SLR 3x10 5#   x   LAQ 3x15  5#   x   Bridge 3x10  Ball   x   Clam x15        Sidelying hip abduction 3x1x  5#   x  prone quad stretch 3x30\"    with strap x   Standing          TKE 3x20 purple  x   Total Gym single leg 3x15 L22     Total gym squats 3x20 L16  x   Calf raise 3x15   x   TRX squat 2x15      Step ups 3x15 8\" L bias, Slow and controlled    Forward reach       S/L Press 3x10 10/20#     Functional       Gait 6x20'  Cues to not drop shoulder and make stance time equal    Heel pause walk 2x20'      Toe pause walk 2x20\"      marches 2x20'       Cart push 3x100' 200 lbs     Other:       Treatment Charges: Mins Units   []  Modalities     [x]  Ther Exercise 44 3   []  Manual Therapy     []  Ther Activities     []  Aquatics     [x]  Vasocompression 15 1   []  Other     Total Treatment time 59 4       Assessment: [x] Progressing toward goals. Patient continues to have increased pain with weight bearing activities. He tried returning to work but had increased knee pain. He continues to have altered gait and functional limitations. [] Other:    [x] Patient would continue to benefit from skilled physical therapy services in order to: He presents with pain, weakness, decreased ROM and functional limitations. He is a good PT candidate to address above mentioned deficits.       STG: (to be met in 6 treatments)  1. ? Pain:2/10. Not Met  2. ? ROM: 0-125 AROM. Met  3. ? Function:Normalize gait. Not Met  4. Patient to be independent with home exercise program as demonstrated by performance with correct form without cues. Met     LTG: (to be met in 12 treatments)  1. Return to work without restrictions. Not Met  2. Ascend and descend stairs step over step without a rail. Not Met                 Patient goals: Improve knee pain and strength, return to work    Pt. Education:  [x] Yes  [] No  [x] Reviewed Prior HEP/Ed  Method of Education: [x] Verbal  [x] Demo  [] Written  Comprehension of Education:  [x] Verbalizes understanding. [] Demonstrates understanding. [x] Needs review.   [] Demonstrates/verbalizes HEP/Ed previously given.     Plan: [x] Continue current frequency toward long and short term goals. Additional visits required 2x week x 12 visits.     [x] Specific Instructions for subsequent treatments:           Time In: 1000           Time Out: 1100    Electronically signed by:  Too Roberts PT

## 2021-11-09 ENCOUNTER — HOSPITAL ENCOUNTER (OUTPATIENT)
Dept: PHYSICAL THERAPY | Facility: CLINIC | Age: 56
Setting detail: THERAPIES SERIES
Discharge: HOME | End: 2021-11-09
Payer: COMMERCIAL

## 2021-11-09 PROCEDURE — 97110 THERAPEUTIC EXERCISES: CPT

## 2021-11-09 PROCEDURE — 97016 VASOPNEUMATIC DEVICE THERAPY: CPT

## 2021-11-09 NOTE — PROGRESS NOTES
3x20 L16  x   Calf raise 3x15   x   TRX squat 2x15      Step ups 3x15 8\" L bias, Slow and controlled    Forward reach       S/L Press 3x10 10/20#     Functional       Gait 6x20'  Cues to not drop shoulder and make stance time equal    Heel pause walk 2x20'      Toe pause walk 2x20\"      marches 2x20'       Cart push 3x100' 200 lbs     Other:       Treatment Charges: Mins Units   []  Modalities     [x]  Ther Exercise 40 3   []  Manual Therapy     []  Ther Activities     []  Aquatics     [x]  Vasocompression 15 1   []  Other     Total Treatment time 55 4       Assessment: [x] Progressing toward goals. Continued with activities from last session to improve LE strength. Pt with increased tolerance noting slightly decreased pain from last session. Pt concluded treatment with vasocompression to decrease pain symptoms. [] Other:    [x] Patient would continue to benefit from skilled physical therapy services in order to: He presents with pain, weakness, decreased ROM and functional limitations. He is a good PT candidate to address above mentioned deficits. STG: (to be met in 6 treatments)  ? Pain:2/10. Not Met  ? ROM: 0-125 AROM. Met  ? Function:Normalize gait. Not Met  Patient to be independent with home exercise program as demonstrated by performance with correct form without cues. Met     LTG: (to be met in 12 treatments)  Return to work without restrictions. Not Met  Ascend and descend stairs step over step without a rail. Not Met                 Patient goals: Improve knee pain and strength, return to work    Pt. Education:  [x] Yes  [] No  [x] Reviewed Prior HEP/Ed  Method of Education: [x] Verbal  [x] Demo  [] Written  Comprehension of Education:  [x] Verbalizes understanding. [] Demonstrates understanding. [x] Needs review. [] Demonstrates/verbalizes HEP/Ed previously given. Plan: [x] Continue current frequency toward long and short term goals.   Additional visits required 2x week x 12 visits.     [x] Specific Instructions for subsequent treatments:           Time In: 0900           Time Out: 1005    Electronically signed by:  Radha Bernard PTA

## 2021-11-10 ENCOUNTER — TELEPHONE (OUTPATIENT)
Dept: ORTHOPEDIC SURGERY | Age: 56
End: 2021-11-10

## 2021-11-10 NOTE — TELEPHONE ENCOUNTER
Patient contacted office in regards to where script was sent for knee brace. Patient given SHANNAN's number at [phone]. Confirmed patient was in contact with the Rep and the brace is being taken care of.

## 2021-11-12 ENCOUNTER — HOSPITAL ENCOUNTER (OUTPATIENT)
Dept: PHYSICAL THERAPY | Facility: CLINIC | Age: 56
Setting detail: THERAPIES SERIES
Discharge: HOME | End: 2021-11-12
Payer: COMMERCIAL

## 2021-11-12 PROCEDURE — 97110 THERAPEUTIC EXERCISES: CPT

## 2021-11-12 PROCEDURE — 97016 VASOPNEUMATIC DEVICE THERAPY: CPT

## 2021-11-12 NOTE — FLOWSHEET NOTE
[] [facility]) - [facility] &  Therapy  [address]  [phone]  F: [phone] [] [address]  [employer] [address]   [address]  P: [phone]  F: [phone] [] [address]  Therapy  [address]  P: [phone]  F: [phone] [x] [address]  P: [phone]  F: [phone] [] [address]  [facility]   [address]   [place]: [phone]  F: [phone]      Physical Therapy Daily Note    Date:  2021  Patient Name:  Deya Ortiz    :  1965  MRN: [id number]  Physician: Kadie Spencer DO                                   Insurance: [facility] (10% co-ins)  Medical Diagnosis: Left medial meniscus tear                      Rehab Codes: S83.242D  Onset date: 21                 Next Dr's appt.: 9/10/21  Visit# / total visits:     Cancels/No Shows: 0/0    Subjective:    Pain:  [x] Yes  [] No Location: L knee Pain Rating: (0-10 scale) 3/10  Pain altered Tx:  [] No  [] Yes  Action:    Comments: Pt reports L medial knee feels \"tight\" upon arrival. Pt states he was able to ride his bike the other day and play 9 holes of golf without much pain or difficulty. Pt also states he is scheduled Monday 11/15/21 for a gel shot in his L knee. Objective:  AROM: 0-130  MMT Knee flex: 5/5, Knee Ext: 4+/5  Gait: Patient ambulates holding his right knee in excessive flexion during stance phase.       Modalities: Vasopneumatic cold with compression x15', 36 deg mod compression  Precautions:  Exercises:  Exercise Reps/ Time Weight/ Level Comments    Bike ROM       Nu-step 10' L5  x   Mat       Ball knee flexion roll 2x15     x   SLR 3x10 5#   x   LAQ 3x15  5#   x   Bridge 2x15  Ball   x   Clam x15        Sidelying hip abduction 3x10  5# x    prone quad stretch 3x30\"    with strap -   Standing          TKE 3x15 purple  x   Total Gym single leg 3x15 L20 increase next visit x   Total gym squats 3x15 L20 Increase next visit x   Calf raise 3x15   x   Heel taps  3x10 4\"  lateral x   TRX squat 2x15      Step ups 3x15 8\" L bias, Slow and controlled    Forward reach       S/L Press 3x10 10/20#     Functional       Gait 6x20'  Cues to not drop shoulder and make stance time equal    Heel pause walk 2x20'      Toe pause walk 2x20\"      marches 2x20'       Cart push 3x100' 200 lbs     Other:       Treatment Charges: Mins Units   []  Modalities     [x]  Ther Exercise 45 3   []  Manual Therapy     []  Ther Activities     []  Aquatics     [x]  Vasocompression 15 1   []  Other     Total Treatment time 60 4       Assessment: [x] Progressing toward goals. Pt able to increase resistance on Total gym without complaints for L LE strengthening. Also added 4\" heel taps with fair elpidio and mild c/o pain in L knee; max cues req for proper technique. Pt then completed mat exercises focusing on L quad and hip strengthening; good elpidio. Ended with vaso for pain management. [] Other:    [x] Patient would continue to benefit from skilled physical therapy services in order to: He presents with pain, weakness, decreased ROM and functional limitations. He is a good PT candidate to address above mentioned deficits. STG: (to be met in 6 treatments)  1. ? Pain:2/10. Not Met  2. ? ROM: 0-125 AROM. Met  3. ? Function:Normalize gait. Not Met  4. Patient to be independent with home exercise program as demonstrated by performance with correct form without cues. Met     LTG: (to be met in 12 treatments)  1. Return to work without restrictions. Not Met  2. Ascend and descend stairs step over step without a rail. Not Met                 Patient goals: Improve knee pain and strength, return to work    Pt.  Education:  [x] Yes  [] No  [x] Reviewed Prior HEP/Ed  Method of Education: [x] Verbal  [x] Demo  [] Written  Comprehension of Education:  [x] Verbalizes understanding. [] Demonstrates understanding. [x] Needs review. [] Demonstrates/verbalizes HEP/Ed previously given. Plan: [x] Continue current frequency toward long and short term goals. Additional visits required 2x week x 12 visits.     [x] Specific Instructions for subsequent treatments:           Time In: 9:00am           Time Out: 10:05am    Electronically signed by:  Mary Shaffer PTA

## 2021-11-15 ENCOUNTER — OFFICE VISIT (OUTPATIENT)
Dept: ORTHOPEDIC SURGERY | Age: 56
End: 2021-11-15
Payer: COMMERCIAL

## 2021-11-15 VITALS — BODY MASS INDEX: 40.29 KG/M2 | WEIGHT: 272 LBS | HEIGHT: 69 IN

## 2021-11-15 DIAGNOSIS — M17.12 ARTHRITIS OF LEFT KNEE: Primary | ICD-10-CM

## 2021-11-15 DIAGNOSIS — Z98.890 S/P LEFT KNEE ARTHROSCOPY: ICD-10-CM

## 2021-11-15 DIAGNOSIS — S83.242D TEAR OF MEDIAL MENISCUS OF LEFT KNEE, CURRENT, UNSPECIFIED TEAR TYPE, SUBSEQUENT ENCOUNTER: ICD-10-CM

## 2021-11-15 PROCEDURE — 20610 DRAIN/INJ JOINT/BURSA W/O US: CPT | Performed by: PHYSICIAN ASSISTANT

## 2021-11-15 ASSESSMENT — ENCOUNTER SYMPTOMS
COLOR CHANGE: 0
VOMITING: 0
COUGH: 0
SHORTNESS OF BREATH: 0

## 2021-11-15 NOTE — PROGRESS NOTES
[facility]  [facility] ORTHO SPECIALISTS  [address] [address]  Dept: [phone]  Dept Fax: [phone]        Procedure Note      Subjective:   Ivette Martinez is a 55y.o. year old male who presents to our office today for routine followup regarding his   1. Arthritis of left knee    2. Tear of medial meniscus of left knee, current, unspecified tear type, subsequent encounter    3. S/P left knee arthroscopy    . Chief Complaint   Patient presents with    Follow-up     L knee pain. Gel injection 1/3     Date of surgery: 8/31/2021     HPI Ivette Martinez  is a 55y.o. year old male who presents to our office today for postoperative follow up after having undergone a left knee arthroscopy with partial medial meniscectomy completed on 8/31/2021 by Dr. Andrew Lopez DO. Patient was last evaluated in our office on 11/3/2021 by Dr. Janet Pollock and was instructed to continue outpatient physical therapy and transition to a home exercise program when he is ready. Patient had returned to work on 11/1/2021 and had a dramatic increase in his left knee pain therefore he return for further evaluation of the left knee. Given a prescription for Medrol Dosepak, order for a medial  brace and prior the authorization process was begun for a 3 part Euflexxa injection series in the left knee. Patient is here today for part 1 of 3 in his left knee Euflexxa injection series. Notes that he has not obtained the medial  brace yet. He notes that the Medrol Dosepak suspected improve his discomfort. He also notes that formal physical therapy has improved his left knee discomfort and range of motion. Review of Systems   Constitutional: Negative for activity change and fever. HENT: Negative for sneezing. Respiratory: Negative for cough and shortness of breath. Cardiovascular: Negative for chest pain. Gastrointestinal: Negative for vomiting.    Musculoskeletal: Positive for arthralgias (left knee). Negative for joint swelling and myalgias. Skin: Negative for color change. Neurological: Negative for weakness and numbness. Psychiatric/Behavioral: Negative for sleep disturbance. Objective :   Ht 5' 9\" (1.753 m)   Wt 272 lb (123.4 kg)   BMI 40.17 kg/m²  Body mass index is 40.17 kg/m². General: Eunice Peralta is a 55 y.o. male who is alert and oriented and sitting comfortably in our office. Ortho Exam  MS: Evaluation of the left knee reveals no obvious knee effusion. Mild tenderness palpation along the medial joint line. AROM left knee 5-100. Motor, sensory, vascular examination to the left lower extremity is grossly intact without focal deficits. Neuro: alert and oriented to person and place. Eyes: Extra-ocular muscles intact  Mouth: Oral mucosa moist. No perioral lesions  Pulm: Respirations unlabored and regular. Symmetric chest excursion without outward deformity is noted. Skin: warm, well perfused  Psych:   Patient has good fund of knowledge and displays understanging of exam, diagnosis, and plan. Radiology:   No results found. Procedure:  After informed consent was obtained verbally, the Left knee was sterilely prepped with Betadine and locally anesthetized with ethyl chloride spray at the lateral joint line just lateral to the patellar tendon. The knee was then injected through this injection site with a Euflexxa 2 mL prefilled syringe injection. The patient tolerated the procedure well without postinjection complications. A sterile dressing was applied. Assessment:      1. Arthritis of left knee    2. Tear of medial meniscus of left knee, current, unspecified tear type, subsequent encounter    3. S/P left knee arthroscopy       Plan:       Patient is here today for left knee Euflexxa injection. Part 1 of 3 the patient's left knee Euflexxa injection series was given today in office. He tolerated the procedure without complication.   At this time patient should remain off work until 11/3/2021 and return to work on 12/1/2021 so that we can complete his left knee gel injection series prior to him returning to work. The patient notes that he will bring in his paperwork so we can send it into his employer. Patient will follow up in 1 week for part 2 of 3 in his left knee Euflexxa injection series. He was instructed to call us with any questions or concerns prior to that appointment. He voiced his understanding. Follow up:Return in about 1 week (around 11/22/2021) for part 2 of 3 left knee Euflexxa injection. No orders of the defined types were placed in this encounter. Orders Placed This Encounter   Procedures    LA ARTHROCENTESIS ASPIR&/INJ MAJOR JT/BURSA W/O US       This note is created with the assistance of a speech recognition program.  While intending to generate a document that actually reflects the content of the visit, the document can still have some errors including those of syntax and sound a like substitutions which may escape proof reading. In such instances, actual meaning can be extrapolated by contextual diversion.      Electronically signed by Mc Rivers PA-C on 11/15/2021 at 9:43 PM

## 2021-11-16 ENCOUNTER — HOSPITAL ENCOUNTER (OUTPATIENT)
Dept: PHYSICAL THERAPY | Facility: CLINIC | Age: 56
Setting detail: THERAPIES SERIES
Discharge: HOME | End: 2021-11-16
Payer: COMMERCIAL

## 2021-11-19 ENCOUNTER — HOSPITAL ENCOUNTER (OUTPATIENT)
Dept: PHYSICAL THERAPY | Facility: CLINIC | Age: 56
Setting detail: THERAPIES SERIES
Discharge: HOME | End: 2021-11-19
Payer: COMMERCIAL

## 2021-11-19 PROCEDURE — 97016 VASOPNEUMATIC DEVICE THERAPY: CPT

## 2021-11-19 PROCEDURE — 97110 THERAPEUTIC EXERCISES: CPT

## 2021-11-19 NOTE — FLOWSHEET NOTE
[] [facility] &  Therapy  [address]  [phone]  F: [phone] [] [address]  [address]   [address]  P: [phone]  F: [phone] [] [address]  Therapy  [address]  P: [phone]  F: [phone] [x] [address]  P: [phone]  F: [phone] [] [address]  [facility]   [address]   [place]: [phone]  F: [phone]      Physical Therapy Daily Note    Date:  2021  Patient Name:  Marc Abreu    :  1965  MRN: [id number]  Physician: Jayce Torres DO                                   Insurance: [employer] (10% co-ins)  Medical Diagnosis: Left medial meniscus tear                      Rehab Codes: S83.242D  Onset date: 21                 Next Dr's appt.: 9/10/21  Visit# / total visits:     Cancels/No Shows: 2/0    Subjective:    Pain:  [x] Yes  [] No Location: L knee Pain Rating: (0-10 scale) 5-6/10  Pain altered Tx:  [] No  [] Yes  Action:    Comments: Pt reported that he feels like he took a step back from the shot and is hoping that it starts to improve. Pt noted increased pain since the shot stated he has been up and moving around since then. Objective:  AROM: 0-130  MMT Knee flex: 5/5, Knee Ext: 4+/5  Gait: Patient ambulates holding his right knee in excessive flexion during stance phase.       Modalities: Vasopneumatic cold with compression x15', 36 deg mod compression  Precautions:  Exercises:  Exercise Reps/ Time Weight/ Level Comments    Bike ROM       Nu-step 10' L5  x   Mat       Ball knee flexion roll 3x15     x   SLR 3x10 5#   x   LAQ 3x15  5#   x   Bridge 3x15  Ball   x   Clam x15        Sidelying hip abduction 3x10  5#   x    prone quad stretch 3x30\"    with strap -   Standing          TKE 3x15 purple  x   Total Gym single leg 3x15 L22  x   Total gym squats 3x15 L22  x   Calf raise 3x15   x   Heel taps  3x10 4\"  lateral x   TRX squat 2x15      Step ups 3x15 8\" L bias, Slow and controlled    Forward reach       S/L Press 3x10 10/20#     Functional       Gait 6x20'  Cues to not drop shoulder and make stance time equal    Heel pause walk 2x20'      Toe pause walk 2x20\"      marches 2x20'       Cart push 3x100' 200 lbs     Other:       Treatment Charges: Mins Units   []  Modalities     [x]  Ther Exercise 50 3   []  Manual Therapy     []  Ther Activities     []  Aquatics     [x]  Vasocompression 15 1   []  Other     Total Treatment time 65 4       Assessment: [x] Progressing toward goals. Pt with good tolerance to activities. Increased reps for ball knee flexion and bridges to increase LE strength. Increased level for TG activities with no reported increase in pain. Pt concluded session with vasocompression to decrease overall pain level. [] Other:    [x] Patient would continue to benefit from skilled physical therapy services in order to: He presents with pain, weakness, decreased ROM and functional limitations. He is a good PT candidate to address above mentioned deficits. STG: (to be met in 6 treatments)  1. ? Pain:2/10. Not Met  2. ? ROM: 0-125 AROM. Met  3. ? Function:Normalize gait. Not Met  4. Patient to be independent with home exercise program as demonstrated by performance with correct form without cues. Met     LTG: (to be met in 12 treatments)  1. Return to work without restrictions. Not Met  2. Ascend and descend stairs step over step without a rail. Not Met                 Patient goals: Improve knee pain and strength, return to work    Pt. Education:  [x] Yes  [] No  [x] Reviewed Prior HEP/Ed  Method of Education: [x] Verbal  [x] Demo  [] Written  Comprehension of Education:  [x] Verbalizes understanding. [] Demonstrates understanding.   [x] Needs review. [] Demonstrates/verbalizes HEP/Ed previously given. Plan: [x] Continue current frequency toward long and short term goals. Additional visits required 2x week x 12 visits.     [x] Specific Instructions for subsequent treatments:           Time In: 0900           Time Out: 1114    Electronically signed by:  Bony Santana PTA

## 2021-11-22 ENCOUNTER — OFFICE VISIT (OUTPATIENT)
Dept: ORTHOPEDIC SURGERY | Age: 56
End: 2021-11-22
Payer: COMMERCIAL

## 2021-11-22 VITALS — HEIGHT: 69 IN | WEIGHT: 271 LBS | BODY MASS INDEX: 40.14 KG/M2

## 2021-11-22 DIAGNOSIS — S83.242D TEAR OF MEDIAL MENISCUS OF LEFT KNEE, CURRENT, UNSPECIFIED TEAR TYPE, SUBSEQUENT ENCOUNTER: ICD-10-CM

## 2021-11-22 DIAGNOSIS — Z98.890 S/P LEFT KNEE ARTHROSCOPY: ICD-10-CM

## 2021-11-22 DIAGNOSIS — M17.12 ARTHRITIS OF LEFT KNEE: Primary | ICD-10-CM

## 2021-11-22 PROCEDURE — 99024 POSTOP FOLLOW-UP VISIT: CPT | Performed by: PHYSICIAN ASSISTANT

## 2021-11-22 PROCEDURE — 20610 DRAIN/INJ JOINT/BURSA W/O US: CPT | Performed by: PHYSICIAN ASSISTANT

## 2021-11-22 ASSESSMENT — ENCOUNTER SYMPTOMS
SHORTNESS OF BREATH: 0
VOMITING: 0
COLOR CHANGE: 0
COUGH: 0

## 2021-11-22 NOTE — PROGRESS NOTES
[facility]  [facility] ORTHO SPECIALISTS  [address] [facility] [address]  Dept: [phone]  Dept Fax: [phone]        Procedure Note      Subjective:   Taylor Huddleston is a 55y.o. year old male who presents to our office today for routine followup regarding his   1. Arthritis of left knee    2. Tear of medial meniscus of left knee, current, unspecified tear type, subsequent encounter    3. S/P left knee arthroscopy        Chief Complaint   Patient presents with    Follow-up     left knee gel inj. 2/3       HPI Taylor Huddleston presents to the office today for follow-up after viscosupplementation  injection of the left knee. Patient had part 1 of 3 in his left knee Euflexxa injection series on 11/15/2021. The patient notes no improvement with the injection. Patient notes that 1 to 2 days after his injection he had increase in discomfort. Patient notes that approximately 2 weeks ago he did get his medial  brace but has not been wearing it consistently. Patient also notes that he is taking diclofenac 75 mg twice daily. He notes episodes of extreme discomfort within the medial aspect of the left knee. He notes that he has been less active due to his left knee pain. Patient is set to return to work on 12/1/2021 with the  at [employer].  He is concerned that his pain might dramatically increase after returning to work. He denies numbness or tingling in the left lower extremitu      Review of Systems   Constitutional: Negative for activity change and fever. HENT: Negative for sneezing. Respiratory: Negative for cough and shortness of breath. Cardiovascular: Negative for chest pain. Gastrointestinal: Negative for vomiting. Musculoskeletal: Positive for arthralgias (left knee). Negative for joint swelling and myalgias. Skin: Negative for color change. Neurological: Negative for weakness and numbness.    Psychiatric/Behavioral: Negative for sleep disturbance. Objective :   Ht 5' 9\" (1.753 m)   Wt 271 lb (122.9 kg)   BMI 40.02 kg/m²  Body mass index is 40.02 kg/m². General: Ivette Martinez is a 55 y.o. male who is alert and oriented and sitting comfortably in our office. Ortho Exam  MS: Evaluation of the left knee reveals no obvious knee effusion. Mild tenderness palpation along the medial joint line. AROM left knee 5-100. Motor, sensory, vascular examination to the left lower extremity is grossly intact without focal deficits. Neuro: alert and oriented to person and place. Eyes: Extra-ocular muscles intact  Mouth: Oral mucosa moist. No perioral lesions  Pulm: Respirations unlabored and regular. Symmetric chest excursion without outward deformity is noted. Skin: warm, well perfused  Psych:   Patient has good fund of knowledge and displays understanging of exam, diagnosis, and plan. Radiology:     XR KNEE LEFT - 4/29/2021  History: Left knee pain       Comparison: none       Findings: 4 weightbearing views of the left knee showing mild degenerative    changes as evidenced by sharpening of the tibial spines with associated    cyst formation in the central tibial plateau. Mild medial joint space    narrowing compared to lateral side. Osteophytes along peripheral patella    as well.       Impression: Mild to moderate left knee OA       Procedure:  After informed consent was obtained verbally, the Left knee was sterilely prepped with Betadine and locally anesthetized with ethyl chloride spray at the lateral joint line just lateral to the patellar tendon. The knee was then injected through this injection site with a Euflexxa 2 mL prefilled syringe injection. The patient tolerated the procedure well without postinjection complications. A sterile dressing was applied. Assessment:      1. Arthritis of left knee    2. Tear of medial meniscus of left knee, current, unspecified tear type, subsequent encounter    3.  S/P left knee arthroscopy       Plan:      Patient is here today for left knee Euflexxa injection. Part 2 of 3 the patient's left knee Euflexxa injection series was given today in office. He tolerated the procedure without complication. At this time patient should remain off work. He is to return to work on 12/1/2021 so that we can complete his left knee gel injection series prior to him returning to work. Patient will follow up in 1 week for part 3 of 3 in his left knee Euflexxa injection series. He notes he has a follow up appointment with Dr. Ilya Michael on wed 12/1/2021 - he can complete part 3 of 3 of his left knee Euflexxa injection series at that appointment. He was instructed to call us with any questions or concerns prior to that appointment. He voiced his understanding. Follow up:Return in about 9 days (around 12/1/2021) for left knee part 3 of 3 euflexxa. Orders Placed This Encounter   Medications    sodium hyaluronate (EUFLEXXA, HYALGAN) injection 20 mg     Order Specific Question:   Which brand are you ordering? Answer:   Euflexxa       Orders Placed This Encounter   Procedures    ME ARTHROCENTESIS ASPIR&/INJ MAJOR JT/BURSA W/O US       This note is created with the assistance of a speech recognition program.  While intending to generate a document that actually reflects the content of the visit, the document can still have some errors including those of syntax and sound a like substitutions which may escape proof reading. In such instances, actual meaning can be extrapolated by contextual diversion.      Electronically signed by Mindy Krishnamurthy PA-C on 12/2/2021 at 11:22 AM

## 2021-11-23 ENCOUNTER — TELEPHONE (OUTPATIENT)
Dept: ORTHOPEDIC SURGERY | Age: 56
End: 2021-11-23

## 2021-11-24 ENCOUNTER — HOSPITAL ENCOUNTER (OUTPATIENT)
Dept: PHYSICAL THERAPY | Facility: CLINIC | Age: 56
Setting detail: THERAPIES SERIES
Discharge: HOME | End: 2021-11-24
Payer: COMMERCIAL

## 2021-11-24 PROCEDURE — 97016 VASOPNEUMATIC DEVICE THERAPY: CPT

## 2021-11-24 PROCEDURE — 97110 THERAPEUTIC EXERCISES: CPT

## 2021-11-24 NOTE — FLOWSHEET NOTE
[] [address] [facility] &  Therapy  [address]  [phone]  F: [phone] [] [address]  [employer] [address]   [address]  P: [phone]  F: [phone] [] [address] &  Therapy  [address]  P: [phone]  F: [phone] [x] [address]  P: [phone]  F: [phone] [] [address]  [facility]   [address]   [place]: [phone]  F: [phone]      Physical Therapy Daily Note    Date:  2021  Patient Name:  Hemal Davies    :  1965  MRN: [id number]  Physician: Yudy Plasencia DO                                   Insurance: [employer] (10% co-ins)  Medical Diagnosis: Left medial meniscus tear                      Rehab Codes: S83.242D  Onset date: 21                 Next 's appt.: 21  Visit# / total visits:     Cancels/No Shows: 2/0    Subjective:    Pain:  [x] Yes  [] No Location: L knee Pain Rating: (0-10 scale) 3/10  Pain altered Tx:  [] No  [] Yes  Action:    Comments: Pt stated will be going to get his L knee brace refitted after tx today but did not have it on arrival. Will going in for his third knee shot . Continues to amb with excessive flexion and antalgic gait. Objective:  AROM: 0-130  MMT Knee flex: 5/5, Knee Ext: 4+/5  Gait: Patient ambulates holding his right knee in excessive flexion during stance phase.       Modalities: Vasopneumatic cold with compression x15', 36 deg mod compression  Precautions:  Exercises:  Exercise Reps/ Time Weight/ Level Comments    Bike ROM       Nu-step 10' L5  x   Mat       Ball knee flexion roll 3x15     x   SLR 3x10 5#   x   LAQ 3x15  5#   x   Bridge 3x15  Ball   x   Clam x15        Sidelying hip abduction 3x10  5#   x    prone quad stretch 3x30\" [] No  [x] Reviewed Prior HEP/Ed  Method of Education: [x] Verbal  [x] Demo  [] Written  Comprehension of Education:  [x] Verbalizes understanding. [] Demonstrates understanding. [x] Needs review. [] Demonstrates/verbalizes HEP/Ed previously given. Plan: [x] Continue current frequency toward long and short term goals. Additional visits required 2x week x 12 visits.     [x] Specific Instructions for subsequent treatments:           Time In: 0900           Time Out: 3553    Electronically signed by:  Sumanth Mccormick PTA

## 2021-11-29 ENCOUNTER — HOSPITAL ENCOUNTER (OUTPATIENT)
Dept: PHYSICAL THERAPY | Facility: CLINIC | Age: 56
Setting detail: THERAPIES SERIES
Discharge: HOME | End: 2021-11-29
Payer: COMMERCIAL

## 2021-11-29 PROCEDURE — 97110 THERAPEUTIC EXERCISES: CPT

## 2021-11-29 PROCEDURE — 97016 VASOPNEUMATIC DEVICE THERAPY: CPT

## 2021-11-29 NOTE — FLOWSHEET NOTE
Added hold 11/24 x   Total Gym single leg 3x15 L22  x   Total gym squats 3x15 L22  x   Calf raise 3x15   x   Step ups  3x10 4\"  lateral x   TRX squat 2x15      Step ups 3x15 8\" L bias, Slow and controlled    Forward reach       S/L Press 3x10 10/20#     Functional       Gait 6x20'  Cues to not drop shoulder and make stance time equal    Heel pause walk 2x20'      Toe pause walk 2x20\"      marches 2x20'       Cart push 3x100' 200 lbs     Stretches        gastrocs 3x30\"  Tilt board x   HS 3x30\"  L only, standing  x   Other:       Treatment Charges: Mins Units   []  Modalities     [x]  Ther Exercise 50 3   []  Manual Therapy     []  Ther Activities     []  Aquatics     [x]  Vasocompression 15 1   []  Other     Total Treatment time 65 4       Assessment: [x] Progressing toward goals. Pt with good tolerance to all activities, focused on technique due to possible discharge pending doctors visit on Wednesday. Pt concluded session with vasocompression. Pt completed LEFS with a score of 45/80. [] Other:    [x] Patient would continue to benefit from skilled physical therapy services in order to: He presents with pain, weakness, decreased ROM and functional limitations. He is a good PT candidate to address above mentioned deficits. STG: (to be met in 6 treatments)  1. ? Pain:2/10. Not Met  2. ? ROM: 0-125 AROM. Met   3. ? Function:Normalize gait. Not Met  4. Patient to be independent with home exercise program as demonstrated by performance with correct form without cues. Met     LTG: (to be met in 12 treatments)  1. Return to work without restrictions. Not Met  2. Ascend and descend stairs step over step without a rail. Not Met                 Patient goals: Improve knee pain and strength, return to work    Pt. Education:  [x] Yes  [] No  [x] Reviewed Prior HEP/Ed  Method of Education: [x] Verbal  [x] Demo  [] Written  Comprehension of Education:  [x] Verbalizes understanding.   [] Demonstrates understanding. [x] Needs review. [] Demonstrates/verbalizes HEP/Ed previously given. Plan: [x] Continue current frequency toward long and short term goals.    [x] Specific Instructions for subsequent treatments: continue or discharge per doctors visit           Time In: 0900           Time Out: 9036    Electronically signed by:  Oren Woodall PTA

## 2021-11-30 RX ORDER — HYALURONATE SODIUM 10 MG/ML
20 SYRINGE (ML) INTRAARTICULAR ONCE
Status: COMPLETED | OUTPATIENT
Start: 2021-11-30 | End: 2021-11-30

## 2021-11-30 RX ADMIN — Medication 20 MG: at 09:53

## 2021-12-01 ENCOUNTER — OFFICE VISIT (OUTPATIENT)
Dept: ORTHOPEDIC SURGERY | Age: 56
End: 2021-12-01
Payer: COMMERCIAL

## 2021-12-01 VITALS — HEIGHT: 69 IN | WEIGHT: 271 LBS | BODY MASS INDEX: 40.14 KG/M2

## 2021-12-01 DIAGNOSIS — M17.12 ARTHRITIS OF LEFT KNEE: Primary | ICD-10-CM

## 2021-12-01 PROCEDURE — 20610 DRAIN/INJ JOINT/BURSA W/O US: CPT | Performed by: ORTHOPAEDIC SURGERY

## 2021-12-01 RX ORDER — HYALURONATE SODIUM 10 MG/ML
20 SYRINGE (ML) INTRAARTICULAR ONCE
Status: COMPLETED | OUTPATIENT
Start: 2021-12-01 | End: 2021-12-01

## 2021-12-01 RX ADMIN — Medication 20 MG: at 14:02

## 2021-12-01 ASSESSMENT — ENCOUNTER SYMPTOMS
CHEST TIGHTNESS: 0
APNEA: 0
ABDOMINAL PAIN: 0
VOMITING: 0
COUGH: 0

## 2021-12-01 NOTE — PROGRESS NOTES
[address] [facility]  [address] [address] [place] [id number]  Dept: [phone]  Dept Fax: [phone]        Ambulatory Follow Up      Subjective:   Lilly Carranza is a 55y.o. year old male who presents to our office today for routine followup regarding his   1. Arthritis of left knee    . Chief Complaint   Patient presents with    Follow-up     left knee       HPI- Lilly Carranza  is a 55 y.o. male who presents today in follow for left knee arthritis. The patient was last seen on 11/22/2021 and underwent treatment in the form of left knee Eufflexa injection 2/3. The patient notes very mild improvement with the previous treatment. The patient is in the office today to have Euflexxa injection 3/3 to the left knee. The patient states that he still is having to wear a brace to the left knee. He has been off of work but is returning to work tomorrow. He is not sure he is ready to return at this time. The patient has continued to go to formal therapy. Review of Systems   Constitutional: Negative for chills and fever. Respiratory: Negative for apnea, cough and chest tightness. Cardiovascular: Negative for chest pain and palpitations. Gastrointestinal: Negative for abdominal pain and vomiting. Genitourinary: Negative for difficulty urinating. Musculoskeletal: Positive for arthralgias (left knee). Negative for gait problem, joint swelling and myalgias. Neurological: Negative for dizziness, weakness and numbness. I have reviewed the CC, HPI, ROS, PMH, FHX, Social History, and if not present in this note, I have reviewed in the patient's chart. I agree with the documentation provided by other staff and have reviewed their documentation prior to providing my signature indicating agreement. Objective :   Ht 5' 9\" (1.753 m)   Wt 271 lb (122.9 kg)   BMI 40.02 kg/m²  Body mass index is 40.02 kg/m².   General: Avni Pack is a 55 y.o. male who is alert and oriented and sitting comfortably in our office. Ortho Exam  MS:  Left knee free from sign of infection. No significant knee effusion on the left is noted. Motor, sensory, vascular examination of the left lower extremity is grossly intact without focal deficits. Neuro: alert and oriented to person and place. Eyes: Extra-ocular muscles intact  Mouth: Oral mucosa moist. No perioral lesions  Pulm: Respirations unlabored and regular. Symmetric chest excursion without outward deformity is noted. Skin: warm, well perfused  Psych:   Patient has good fund of knowledge and displays understanging of exam, diagnosis, and plan. Radiology: No results found. After informed consent was obtained verbally, the Left knee was sterilely prepped with Betadine and locally anesthetized with ethyl chloride spray at the lateral joint line just lateral to the patellar tendon. The knee was then injected through this injection site with a  Euflexxa prefilled syringe injection. The patient tolerated the procedure well without postinjection complications. A sterile dressing was applied. Assessment:      1. Arthritis of left knee       Plan:      Patient would like to go forward with Euflexxa Injection 3/3 today to the left knee. The patient tolerates the injection well. The patient is safe to return to work tomorrow 12/2/21. If he decides to take more time off of work this is on his own accord. Will not provide any more FMLA to the patient to be off of work. If the patient is still having pain and feels like he cant continue to work he should follow up in the office to discuss a left total knee arthroplasty. The patient to wear brace to the left knee as tolerates and can complete all activities as tolerates. The patient to follow up in the office in 6 weeks for re-asessment. The patient knows to call with any questions or concerns.       Follow up:Return in about 6 weeks (around 1/12/2022). Orders Placed This Encounter   Medications    sodium hyaluronate (EUFLEXXA, HYALGAN) injection 20 mg     Order Specific Question:   Which brand are you ordering? Answer:   Alka Morel This Encounter   Procedures    20610 - DRAIN/INJECT LARGE JOINT/BURSA     IMilvia LPN am scribing for and in the presence of Dr. Jem Monte  12/1/2021 12:42 PM      I have reviewed and made changes accordingly to the work scribed by Milvia Berumen LPN. The documentation accurately reflects work and decisions made by me. I have also reviewed documentation completed by clinical staff.     Jem Monte DO, [facility]  12/1/2021 12:43 PM    This note is created with the assistance of a speech recognition program.  While intending to generate a document that actually reflects the content of the visit, the document can still have some errors including those of syntax and sound a like substitutions which may escape proof reading.  In such instances, actual meaning can be extrapolated by contextual diversion      Electronically signed by Nathanael Meigs, DO, FAOAO on 12/1/2021 at 12:42 PM

## 2021-12-02 RX ORDER — HYALURONATE SODIUM 10 MG/ML
20 SYRINGE (ML) INTRAARTICULAR ONCE
Status: SHIPPED | OUTPATIENT
Start: 2021-12-02

## 2021-12-06 DIAGNOSIS — M17.12 ARTHRITIS OF LEFT KNEE: ICD-10-CM

## 2021-12-06 RX ORDER — DICLOFENAC SODIUM 75 MG/1
75 TABLET, DELAYED RELEASE ORAL 2 TIMES DAILY
Qty: 60 TABLET | Refills: 2 | Status: SHIPPED | OUTPATIENT
Start: 2021-12-06 | End: 2022-02-21

## 2021-12-06 NOTE — TELEPHONE ENCOUNTER
Patient would like a refill of Diclofenac    Medication pending. LAST OFFICE VISIT 12/01/2021.   NEXT VISIT : 01/26/2022    ARTHRITIS LEFT KNEE

## 2021-12-17 ENCOUNTER — OFFICE VISIT (OUTPATIENT)
Dept: ORTHOPEDIC SURGERY | Age: 56
End: 2021-12-17
Payer: COMMERCIAL

## 2021-12-17 VITALS — WEIGHT: 272 LBS | HEIGHT: 69 IN | BODY MASS INDEX: 40.29 KG/M2

## 2021-12-17 DIAGNOSIS — M17.12 ARTHRITIS OF LEFT KNEE: Primary | ICD-10-CM

## 2021-12-17 DIAGNOSIS — Z98.890 S/P LEFT KNEE ARTHROSCOPY: ICD-10-CM

## 2021-12-17 DIAGNOSIS — S83.242D TEAR OF MEDIAL MENISCUS OF LEFT KNEE, CURRENT, UNSPECIFIED TEAR TYPE, SUBSEQUENT ENCOUNTER: ICD-10-CM

## 2021-12-17 PROCEDURE — 99214 OFFICE O/P EST MOD 30 MIN: CPT | Performed by: PHYSICIAN ASSISTANT

## 2021-12-17 NOTE — PROGRESS NOTES
for him to complete his daily work tasks. He denies new injury or trauma to the right knee. He denies numbness and or tingling in the right lower extremity. Patient is a nondiabetic. Non-smoker. Most recent BMI of 40.17 (272 pounds). Review of Systems   Constitutional: Negative for activity change and fever. HENT: Negative for sneezing. Respiratory: Negative for cough and shortness of breath. Cardiovascular: Negative for chest pain. Gastrointestinal: Negative for vomiting. Musculoskeletal: Positive for arthralgias (right knee). Negative for joint swelling and myalgias. Skin: Negative for color change. Neurological: Negative for weakness and numbness. Psychiatric/Behavioral: Negative for sleep disturbance. Objective :   Ht 5' 9\" (1.753 m)   Wt 272 lb (123.4 kg)   BMI 40.17 kg/m²  Body mass index is 40.17 kg/m². General: Suzan Hernandez is a 55 y.o. male who is alert and oriented and sitting comfortably in our office. Ortho Exam  MS: Arrived wearing a medial  brace in the right lower extremity. After removal of brace evaluation of the right knee reveals no obvious deformity, erythema, ecchymosis, new skin lesions or signs of infection noted. There to well-healed surgical portal incisions over the anterior aspect of the right knee. AROM right knee 5-95. No noticeable effusion appreciated. Tenderness to palpation noted along the medial joint line. No gross ligamentous laxity is appreciated with valgus or varus force placed on the right knee in 0 or 30 degrees of right knee flexion. Negative anterior drawer and Lachman's. Positive medial Melissa's test without a palpable click appreciated. Motor, sensory, vascular examination to the right lower extremity is grossly intact without focal deficits. Neuro: alert and oriented to person and place.    Eyes: Extra-ocular muscles intact  Mouth: Oral mucosa moist. No perioral lesions  Pulm: Respirations unlabored and regular. Symmetric chest excursion without outward deformity is noted. Skin: warm, well perfused  Psych:   Patient has good fund of knowledge and displays understanging of exam, diagnosis, and plan. Radiology: No new imaging taken today in office. Assessment:      1. Arthritis of left knee    2. Tear of medial meniscus of left knee, current, unspecified tear type, subsequent encounter    3. S/P left knee arthroscopy       Plan:       Patient is here for continued right knee pain after a right knee arthroscopy with partial medial meniscectomy on 8/31/2021 completed by Dr. Aliya Cueva DO. Patient has continued to wear his medial  brace but still notes discomfort within the knee. Patient should continue taking his diclofenac 75 mg, 1 tablet twice daily as prescribed for his right lower extremity discomfort. I discussed with the patient that I would like him to call our [employer] rep and see if he can get his brace adjusted to help fit properly so that he is not tugging on it all the time. I also discussed with the patient that due to the fact that he just recently completed his right knee Visco supplemental injection series (Euflexxa 3 part series), that it is recommended that we give approximately 6-8 more weeks to see if the injection will be beneficial for him. Patient noted his understanding. The patient has a previously scheduled appointment with Dr. Aliya Cueva DO on 6/20/2022. At that time if Dr. Jamaal Trotter deems appropriate he may begin scheduling process for a right total knee arthroplasty if the patient is interested. Discussed the patient that it is very important that he does not gain any more weight prior to his next appointment and that he actually tends to lose weights that his BMI is at or below 40.0 prior to his next appointment. His current BMI is 40.17 with a weight of 272 pounds. Patient voices understanding. He will follow-up in 6 weeks with Dr. Jamaal Trotter. Follow up:Return in about 6 weeks (around 1/28/2022) for re-evaluation with Dr Danny Barrientos. No orders of the defined types were placed in this encounter. No orders of the defined types were placed in this encounter. This note is created with the assistance of a speech recognition program.  While intending to generate a document that actually reflects the content of the visit, the document can still have some errors including those of syntax and sound a like substitutions which may escape proof reading. In such instances, actual meaning can be extrapolated by contextual diversion.      Electronically signed by Ines Gonzalez PA-C on 12/18/2021 at 8:19 AM

## 2021-12-18 ASSESSMENT — ENCOUNTER SYMPTOMS
COLOR CHANGE: 0
COUGH: 0
SHORTNESS OF BREATH: 0
VOMITING: 0

## 2022-01-07 ENCOUNTER — HOSPITAL ENCOUNTER (OUTPATIENT)
Age: 57
Setting detail: SPECIMEN
Discharge: HOME | End: 2022-01-07

## 2022-01-07 LAB
ALBUMIN SERPL-MCNC: 4.3 G/DL (ref 3.5–5.2)
ALBUMIN/GLOBULIN RATIO: 2 (ref 1–2.5)
ALP BLD-CCNC: 47 U/L (ref 40–129)
ALT SERPL-CCNC: 46 U/L (ref 5–41)
ANION GAP SERPL CALCULATED.3IONS-SCNC: 14 MMOL/L (ref 9–17)
AST SERPL-CCNC: 27 U/L
BILIRUB SERPL-MCNC: 0.36 MG/DL (ref 0.3–1.2)
BUN BLDV-MCNC: 13 MG/DL (ref 6–20)
BUN/CREAT BLD: ABNORMAL (ref 9–20)
CALCIUM SERPL-MCNC: 9.2 MG/DL (ref 8.6–10.4)
CHLORIDE BLD-SCNC: 105 MMOL/L (ref 98–107)
CHOLESTEROL, FASTING: 162 MG/DL
CHOLESTEROL/HDL RATIO: 4.6
CO2: 22 MMOL/L (ref 20–31)
CREAT SERPL-MCNC: 0.57 MG/DL (ref 0.7–1.2)
ESTIMATED AVERAGE GLUCOSE: 114 MG/DL
GFR AFRICAN AMERICAN: >60 ML/MIN
GFR NON-AFRICAN AMERICAN: >60 ML/MIN
GFR SERPL CREATININE-BSD FRML MDRD: ABNORMAL ML/MIN/{1.73_M2}
GFR SERPL CREATININE-BSD FRML MDRD: ABNORMAL ML/MIN/{1.73_M2}
GLUCOSE BLD-MCNC: 88 MG/DL (ref 70–99)
HBA1C MFR BLD: 5.6 % (ref 4–6)
HCT VFR BLD CALC: 41.6 % (ref 40.7–50.3)
HDLC SERPL-MCNC: 35 MG/DL
HEMOGLOBIN: 13.7 G/DL (ref 13–17)
IRON: 74 UG/DL (ref 59–158)
LDL CHOLESTEROL: 88 MG/DL (ref 0–130)
MCH RBC QN AUTO: 29.3 PG (ref 25.2–33.5)
MCHC RBC AUTO-ENTMCNC: 32.9 G/DL (ref 28.4–34.8)
MCV RBC AUTO: 89.1 FL (ref 82.6–102.9)
NRBC AUTOMATED: 0 PER 100 WBC
PDW BLD-RTO: 13.3 % (ref 11.8–14.4)
PLATELET # BLD: 265 K/UL (ref 138–453)
PMV BLD AUTO: 9.4 FL (ref 8.1–13.5)
POTASSIUM SERPL-SCNC: 4.5 MMOL/L (ref 3.7–5.3)
RBC # BLD: 4.67 M/UL (ref 4.21–5.77)
SODIUM BLD-SCNC: 141 MMOL/L (ref 135–144)
TOTAL PROTEIN: 6.5 G/DL (ref 6.4–8.3)
TRIGLYCERIDE, FASTING: 195 MG/DL
URIC ACID: 5.7 MG/DL (ref 3.4–7)
VITAMIN B-12: 530 PG/ML (ref 232–1245)
VITAMIN D 25-HYDROXY: 34.7 NG/ML (ref 30–100)
VLDLC SERPL CALC-MCNC: ABNORMAL MG/DL (ref 1–30)
WBC # BLD: 8.2 K/UL (ref 3.5–11.3)

## 2022-01-26 ENCOUNTER — OFFICE VISIT (OUTPATIENT)
Dept: ORTHOPEDIC SURGERY | Age: 57
End: 2022-01-26
Payer: COMMERCIAL

## 2022-01-26 VITALS — RESPIRATION RATE: 16 BRPM | HEIGHT: 69 IN | BODY MASS INDEX: 40.29 KG/M2 | WEIGHT: 272 LBS

## 2022-01-26 DIAGNOSIS — S83.242D TEAR OF MEDIAL MENISCUS OF LEFT KNEE, CURRENT, UNSPECIFIED TEAR TYPE, SUBSEQUENT ENCOUNTER: ICD-10-CM

## 2022-01-26 DIAGNOSIS — M17.12 ARTHRITIS OF LEFT KNEE: Primary | ICD-10-CM

## 2022-01-26 DIAGNOSIS — Z98.890 S/P LEFT KNEE ARTHROSCOPY: ICD-10-CM

## 2022-01-26 PROCEDURE — 99213 OFFICE O/P EST LOW 20 MIN: CPT | Performed by: ORTHOPAEDIC SURGERY

## 2022-01-26 ASSESSMENT — ENCOUNTER SYMPTOMS
CONSTIPATION: 0
DIARRHEA: 0
COUGH: 0
NAUSEA: 0

## 2022-01-26 NOTE — PROGRESS NOTES
[address] [facility]  [address] [address] [place] [address] [place]  [address] [address]  Dept: [phone]  Dept Fax: [phone]        Ambulatory Follow Up      Subjective:   Bety Cox is a 65y.o. year old male who presents to our office today for routine followup regarding his   1. Arthritis of left knee    2. Tear of medial meniscus of left knee, current, unspecified tear type, subsequent encounter    3. S/P left knee arthroscopy    . Chief Complaint   Patient presents with    Follow-up     lefft knee       HPI-HPI Bety Cox  is a 55 y. o.male who presents today in follow for knee pain after left knee arthroscopy with partial medial meniscectomy completed on 8/31/2021. Patient finished his Euflexxa series on 12/17/2021. He notes that he is able to complete his daily job but has to sit down often to help rest his knee. He says he tries to take the elevator if possible. He says after getting refitted for his medial  brace he is doing a lot better and can complete all activities. Review of Systems   Constitutional: Negative for chills and fever. Respiratory: Negative for cough. Gastrointestinal: Negative for constipation, diarrhea and nausea. Musculoskeletal: Positive for arthralgias (left knee). Negative for gait problem, joint swelling and myalgias. Neurological: Negative for dizziness, weakness and numbness. I have reviewed the CC, HPI, ROS, PMH, FHX, Social History, and if not present in this note, I have reviewed in the patient's chart. I agree with the documentation provided by other staff and have reviewed their documentation prior to providing my signature indicating agreement. Objective :   Resp 16   Ht 5' 9\" (1.753 m)   Wt 272 lb (123.4 kg)   BMI 40.17 kg/m²  Body mass index is 40.17 kg/m².   General: Bety Cox is a 65 y.o. male who is alert and oriented and sitting comfortably in our office. Ortho Exam  MS:  Mild limp left lower extremity is noted. Minimal knee effusion on the left is appreciated. No instability of the left knee is appreciated. Motor, sensory, vascular examination of the left lower extremity is grossly intact without focal deficits. Neuro: alert and oriented to person and place. Eyes: Extra-ocular muscles intact  Mouth: Oral mucosa moist. No perioral lesions  Pulm: Respirations unlabored and regular. Symmetric chest excursion without outward deformity is noted. Skin: warm, well perfused  Psych:   Patient has good fund of knowledge and displays understanging of exam, diagnosis, and plan. Radiology: No x-rays were taken in office today. Assessment:      1. Arthritis of left knee    2. Tear of medial meniscus of left knee, current, unspecified tear type, subsequent encounter    3. S/P left knee arthroscopy       Plan:      Went over previous radiographs with patient. Patient to continue with his home exercise program and diclofenac. He is to continue with his medial  brace. Discussed with him that we will continue to see how the Euflexxa injections help him over the next few weeks. The patient certainly has seen some improvement since his last evaluation and clinically seems to be doing a little better. Patient will follow up in 3 months for evaluation. Follow up:Return in about 3 months (around 4/26/2022) for re- evaluation. No orders of the defined types were placed in this encounter. No orders of the defined types were placed in this encounter. Kojo Lund RN am scribing for and in the presence of Dr. Hermila Fuller  1/26/2022 12:57 PM      I have reviewed and made changes accordingly to the work scribed by Garima Blanton RN. The documentation accurately reflects work and decisions made by me. I have also reviewed documentation completed by clinical staff.     Hermila Fuller DO, [facility]  1/26/2022 12:58 PM    This note is created with the assistance of a speech recognition program.  While intending to generate a document that actually reflects the content of the visit, the document can still have some errors including those of syntax and sound a like substitutions which may escape proof reading.  In such instances, actual meaning can be extrapolated by contextual diversion      Electronically signed by Marybeth Carr DO, FAOAO on 1/26/2022 at 12:57 PM

## 2022-02-18 DIAGNOSIS — M17.12 ARTHRITIS OF LEFT KNEE: ICD-10-CM

## 2022-02-21 RX ORDER — DICLOFENAC SODIUM 75 MG/1
TABLET, DELAYED RELEASE ORAL
Qty: 60 TABLET | Refills: 2 | Status: SHIPPED | OUTPATIENT
Start: 2022-02-21 | End: 2022-05-10

## 2022-02-21 NOTE — TELEPHONE ENCOUNTER
1/26/2021  1. Arthritis of left knee    2. Tear of medial meniscus of left knee, current, unspecified tear type, subsequent encounter    3. S/P left knee arthroscopy      Refill request pended.  Follow up 4/20/2022

## 2022-04-01 ENCOUNTER — HOSPITAL ENCOUNTER (OUTPATIENT)
Age: 57
Setting detail: SPECIMEN
Discharge: HOME | End: 2022-04-01

## 2022-04-01 LAB
ALBUMIN SERPL-MCNC: 4.7 G/DL (ref 3.5–5.2)
ALBUMIN/GLOBULIN RATIO: 2.1 (ref 1–2.5)
ALP BLD-CCNC: 46 U/L (ref 40–129)
ALT SERPL-CCNC: 38 U/L (ref 5–41)
ANION GAP SERPL CALCULATED.3IONS-SCNC: 18 MMOL/L (ref 9–17)
AST SERPL-CCNC: 26 U/L
BILIRUB SERPL-MCNC: 0.4 MG/DL (ref 0.3–1.2)
BUN BLDV-MCNC: 15 MG/DL (ref 6–20)
CALCIUM SERPL-MCNC: 10 MG/DL (ref 8.6–10.4)
CHLORIDE BLD-SCNC: 103 MMOL/L (ref 98–107)
CHOLESTEROL, FASTING: 151 MG/DL
CHOLESTEROL/HDL RATIO: 4.4
CO2: 21 MMOL/L (ref 20–31)
CREAT SERPL-MCNC: 0.6 MG/DL (ref 0.7–1.2)
ESTIMATED AVERAGE GLUCOSE: 114 MG/DL
FOLATE: >20 NG/ML
GFR AFRICAN AMERICAN: >60 ML/MIN
GFR NON-AFRICAN AMERICAN: >60 ML/MIN
GFR SERPL CREATININE-BSD FRML MDRD: ABNORMAL ML/MIN/{1.73_M2}
GLUCOSE BLD-MCNC: 88 MG/DL (ref 70–99)
HBA1C MFR BLD: 5.6 % (ref 4–6)
HCT VFR BLD CALC: 44.2 % (ref 40.7–50.3)
HDLC SERPL-MCNC: 34 MG/DL
HEMOGLOBIN: 14.2 G/DL (ref 13–17)
IRON: 67 UG/DL (ref 59–158)
LDL CHOLESTEROL: 79 MG/DL (ref 0–130)
MCH RBC QN AUTO: 29.8 PG (ref 25.2–33.5)
MCHC RBC AUTO-ENTMCNC: 32.1 G/DL (ref 28.4–34.8)
MCV RBC AUTO: 92.7 FL (ref 82.6–102.9)
NRBC AUTOMATED: 0 PER 100 WBC
PDW BLD-RTO: 13.2 % (ref 11.8–14.4)
PLATELET # BLD: 243 K/UL (ref 138–453)
PMV BLD AUTO: 10.1 FL (ref 8.1–13.5)
POTASSIUM SERPL-SCNC: 4.8 MMOL/L (ref 3.7–5.3)
RBC # BLD: 4.77 M/UL (ref 4.21–5.77)
SODIUM BLD-SCNC: 142 MMOL/L (ref 135–144)
TOTAL PROTEIN: 6.9 G/DL (ref 6.4–8.3)
TRIGLYCERIDE, FASTING: 190 MG/DL
URIC ACID: 5.4 MG/DL (ref 3.4–7)
VITAMIN B-12: 569 PG/ML (ref 232–1245)
VITAMIN D 25-HYDROXY: 39.6 NG/ML
WBC # BLD: 8.9 K/UL (ref 3.5–11.3)

## 2022-04-20 ENCOUNTER — OFFICE VISIT (OUTPATIENT)
Dept: ORTHOPEDIC SURGERY | Age: 57
End: 2022-04-20
Payer: COMMERCIAL

## 2022-04-20 VITALS — HEIGHT: 69 IN | BODY MASS INDEX: 41.41 KG/M2 | WEIGHT: 279.6 LBS

## 2022-04-20 DIAGNOSIS — M17.12 ARTHRITIS OF LEFT KNEE: Primary | ICD-10-CM

## 2022-04-20 PROCEDURE — 99213 OFFICE O/P EST LOW 20 MIN: CPT | Performed by: ORTHOPAEDIC SURGERY

## 2022-04-20 ASSESSMENT — ENCOUNTER SYMPTOMS
ABDOMINAL PAIN: 0
EYE DISCHARGE: 0
ROS SKIN COMMENTS: NEGATIVE FOR RASH
SHORTNESS OF BREATH: 0

## 2022-04-20 NOTE — PROGRESS NOTES
[address] [facility]  [address] [address] [place] [address]  [address] [address]  Dept: [phone]  Dept Fax: [phone]        Ambulatory Follow Up      Subjective:   Snehal Castellano is a 65y.o. year old male who presents to our office today for routine followup regarding his   1. Arthritis of left knee    . Chief Complaint   Patient presents with    Knee Pain     left knee pain delfina today       HPI     Taylor Clancy is a 67-year-old male who presents the office today for recheck of his left knee. He underwent left knee arthroscopy with partial medial meniscectomy on 8/31/2021. At that time he was found to have significant patellofemoral and medial joint line degenerative changes. He has continued to have some pain and a limp to his left knee. He is able to work but he does that with a knee brace. He had Euflexxa injections December 17, 2021 with mild improvement in his symptoms. He is unable to go up and down stairs well because of his knee and he is also unable to play basketball with his nephew like he used to because of his knee. Again he is able to do most of his usual activities of daily living at work but he has to take rest periods during work because of his knee pain. Review of Systems   Constitutional: Positive for activity change. Negative for fever. HENT: Negative for dental problem. Eyes: Negative for discharge. Respiratory: Negative for shortness of breath. Cardiovascular: Negative for chest pain. Gastrointestinal: Negative for abdominal pain. Genitourinary: Negative. Musculoskeletal: Positive for arthralgias. Skin:        Negative for rash   Neurological: Positive for weakness. Psychiatric/Behavioral: Negative for confusion. I have reviewed the CC, HPI, ROS, PMH, FHX, Social History, and if not present in this note, I have reviewed in the patient's chart.    I agree with the documentation provided by other staff and have reviewed their documentation prior to providing my signature indicating agreement. Objective :   Ht 5' 9\" (1.753 m)   Wt 279 lb 9.6 oz (126.8 kg)   BMI 41.29 kg/m²  Body mass index is 41.29 kg/m². General: Abhijeet Cuevas is a 65 y.o. male who is alert and oriented and sitting comfortably in our office. Ortho Exam  MS: Evaluation of the left knee reveals minimal knee effusion. Patient ambulates with mild short weightbearing phase but no significant antalgia to the left lower extremity. Mild extensor lag on the left is appreciated. No gross instability of the left knee is noted. Negative hip logroll and Stinchfield test is noted. Motor, sensory, vascular examination of the left lower extremity is grossly intact without focal deficits. Neuro: alert and oriented to person and place. Eyes: Extra-ocular muscles intact  Mouth: Oral mucosa moist. No perioral lesions  Pulm: Respirations unlabored and regular. Symmetric chest excursion without outward deformity is noted. Skin: warm, well perfused  Psych:   Patient has good fund of knowledge and displays understanging of exam, diagnosis, and plan. Radiology: No x-rays were taken in the office today. Assessment:      1. Arthritis of left knee       Plan: With the degree of degenerative changes to the left knee and continued pain, the patient really has 3 basic options for treatment. Option #1 would be to live with his knee as his. Option #2 would be nonoperative treatment in the form of continued diclofenac, periodic steroid or viscosupplementation injections, continued knee brace and activity modification. Option #3 would be a knee replacement if his symptoms became worse or he felt knee replacement was necessary secondary to the persistent pain. He notes understanding. At this point we will plan to see him back as needed.   The patient is going to continue with diclofenac and his knee brace at this time. Follow up:No follow-ups on file. No orders of the defined types were placed in this encounter. No orders of the defined types were placed in this encounter.       This note is created with the assistance of a speech recognition program.  While intending to generate a document that actually reflects the content of the visit, the document can still have some errors including those of syntax and sound a like substitutions which may escape proof reading.  In such instances, actual meaning can be extrapolated by contextual diversion      Electronically signed by Colonel Robert on 4/20/2022 at 8:04 AM

## 2022-05-09 DIAGNOSIS — M17.12 ARTHRITIS OF LEFT KNEE: ICD-10-CM

## 2022-05-10 RX ORDER — DICLOFENAC SODIUM 75 MG/1
TABLET, DELAYED RELEASE ORAL
Qty: 60 TABLET | Refills: 2 | Status: SHIPPED | OUTPATIENT
Start: 2022-05-10

## 2022-07-08 ENCOUNTER — HOSPITAL ENCOUNTER (OUTPATIENT)
Age: 57
Setting detail: SPECIMEN
Discharge: HOME | End: 2022-07-08

## 2022-07-08 LAB
ALBUMIN SERPL-MCNC: 4.5 G/DL (ref 3.5–5.2)
ALBUMIN/GLOBULIN RATIO: 2.1 (ref 1–2.5)
ALP BLD-CCNC: 45 U/L (ref 40–129)
ALT SERPL-CCNC: 50 U/L (ref 5–41)
ANION GAP SERPL CALCULATED.3IONS-SCNC: 15 MMOL/L (ref 9–17)
AST SERPL-CCNC: 33 U/L
BILIRUB SERPL-MCNC: 0.43 MG/DL (ref 0.3–1.2)
BUN BLDV-MCNC: 14 MG/DL (ref 6–20)
CALCIUM SERPL-MCNC: 9.4 MG/DL (ref 8.6–10.4)
CHLORIDE BLD-SCNC: 104 MMOL/L (ref 98–107)
CHOLESTEROL/HDL RATIO: 4.4
CHOLESTEROL: 154 MG/DL
CO2: 21 MMOL/L (ref 20–31)
CREAT SERPL-MCNC: 0.51 MG/DL (ref 0.7–1.2)
ESTIMATED AVERAGE GLUCOSE: 117 MG/DL
GFR AFRICAN AMERICAN: >60 ML/MIN
GFR NON-AFRICAN AMERICAN: >60 ML/MIN
GFR SERPL CREATININE-BSD FRML MDRD: ABNORMAL ML/MIN/{1.73_M2}
GLUCOSE BLD-MCNC: 104 MG/DL (ref 70–99)
HBA1C MFR BLD: 5.7 % (ref 4–6)
HCT VFR BLD CALC: 42.4 % (ref 40.7–50.3)
HDLC SERPL-MCNC: 35 MG/DL
HEMOGLOBIN: 14 G/DL (ref 13–17)
IRON: 94 UG/DL (ref 59–158)
LDL CHOLESTEROL: 57 MG/DL (ref 0–130)
MCH RBC QN AUTO: 29.9 PG (ref 25.2–33.5)
MCHC RBC AUTO-ENTMCNC: 33 G/DL (ref 28.4–34.8)
MCV RBC AUTO: 90.6 FL (ref 82.6–102.9)
NRBC AUTOMATED: 0 PER 100 WBC
PDW BLD-RTO: 13.3 % (ref 11.8–14.4)
PLATELET # BLD: 236 K/UL (ref 138–453)
PMV BLD AUTO: 10.1 FL (ref 8.1–13.5)
POTASSIUM SERPL-SCNC: 4.6 MMOL/L (ref 3.7–5.3)
PROSTATE SPECIFIC ANTIGEN: 0.58 NG/ML
RBC # BLD: 4.68 M/UL (ref 4.21–5.77)
SODIUM BLD-SCNC: 140 MMOL/L (ref 135–144)
TOTAL PROTEIN: 6.6 G/DL (ref 6.4–8.3)
TRIGL SERPL-MCNC: 309 MG/DL
URIC ACID: 4.7 MG/DL (ref 3.4–7)
VITAMIN B-12: 713 PG/ML (ref 232–1245)
VITAMIN D 25-HYDROXY: 54.9 NG/ML
WBC # BLD: 7.4 K/UL (ref 3.5–11.3)

## 2022-07-12 LAB — VITAMIN B1 WHOLE BLOOD: 207 NMOL/L (ref 70–180)

## 2022-08-16 DIAGNOSIS — M17.12 ARTHRITIS OF LEFT KNEE: Primary | ICD-10-CM

## 2022-08-16 DIAGNOSIS — M17.12 ARTHRITIS OF LEFT KNEE: ICD-10-CM

## 2022-08-16 RX ORDER — DICLOFENAC SODIUM 75 MG/1
TABLET, DELAYED RELEASE ORAL
Qty: 60 TABLET | Refills: 2 | OUTPATIENT
Start: 2022-08-16

## 2022-08-16 RX ORDER — DICLOFENAC SODIUM 75 MG/1
75 TABLET, DELAYED RELEASE ORAL 2 TIMES DAILY
Qty: 60 TABLET | Refills: 3 | Status: SHIPPED | OUTPATIENT
Start: 2022-08-16

## 2022-08-16 NOTE — TELEPHONE ENCOUNTER
Pt would like a refill on his voltaren. Last seen by Dr. Erika West on 4/20/22. No upcoming appointments. Please advise.

## 2022-10-19 ENCOUNTER — HOSPITAL ENCOUNTER (OUTPATIENT)
Age: 57
Setting detail: SPECIMEN
Discharge: HOME | End: 2022-10-19

## 2022-10-19 LAB
ALBUMIN SERPL-MCNC: 4.5 G/DL (ref 3.5–5.2)
ALBUMIN/GLOBULIN RATIO: 1.8 (ref 1–2.5)
ALP BLD-CCNC: 50 U/L (ref 40–129)
ALT SERPL-CCNC: 30 U/L (ref 5–41)
ANION GAP SERPL CALCULATED.3IONS-SCNC: 13 MMOL/L (ref 9–17)
AST SERPL-CCNC: 20 U/L
BILIRUB SERPL-MCNC: 0.4 MG/DL (ref 0.3–1.2)
BUN BLDV-MCNC: 16 MG/DL (ref 6–20)
CALCIUM SERPL-MCNC: 9.6 MG/DL (ref 8.6–10.4)
CHLORIDE BLD-SCNC: 104 MMOL/L (ref 98–107)
CHOLESTEROL/HDL RATIO: 5.3
CHOLESTEROL: 175 MG/DL
CO2: 23 MMOL/L (ref 20–31)
CREAT SERPL-MCNC: 0.51 MG/DL (ref 0.7–1.2)
ESTIMATED AVERAGE GLUCOSE: 108 MG/DL
GFR SERPL CREATININE-BSD FRML MDRD: >60 ML/MIN/1.73M2
GLUCOSE BLD-MCNC: 99 MG/DL (ref 70–99)
HBA1C MFR BLD: 5.4 % (ref 4–6)
HCT VFR BLD CALC: 44.9 % (ref 40.7–50.3)
HDLC SERPL-MCNC: 33 MG/DL
HEMOGLOBIN: 15 G/DL (ref 13–17)
IRON: 90 UG/DL (ref 59–158)
LDL CHOLESTEROL: 86 MG/DL (ref 0–130)
MCH RBC QN AUTO: 30.4 PG (ref 25.2–33.5)
MCHC RBC AUTO-ENTMCNC: 33.4 G/DL (ref 28.4–34.8)
MCV RBC AUTO: 91.1 FL (ref 82.6–102.9)
NRBC AUTOMATED: 0 PER 100 WBC
PDW BLD-RTO: 12.3 % (ref 11.8–14.4)
PLATELET # BLD: 269 K/UL (ref 138–453)
PMV BLD AUTO: 9.8 FL (ref 8.1–13.5)
POTASSIUM SERPL-SCNC: 4.6 MMOL/L (ref 3.7–5.3)
RBC # BLD: 4.93 M/UL (ref 4.21–5.77)
SODIUM BLD-SCNC: 140 MMOL/L (ref 135–144)
TOTAL PROTEIN: 7 G/DL (ref 6.4–8.3)
TRIGL SERPL-MCNC: 282 MG/DL
URIC ACID: 4.3 MG/DL (ref 3.4–7)
VITAMIN B-12: 622 PG/ML (ref 232–1245)
VITAMIN D 25-HYDROXY: 52.5 NG/ML
WBC # BLD: 8.3 K/UL (ref 3.5–11.3)

## 2022-10-25 LAB — VITAMIN B1 WHOLE BLOOD: 93 NMOL/L (ref 70–180)

## 2022-11-30 ENCOUNTER — HOSPITAL ENCOUNTER (OUTPATIENT)
Dept: VASCULAR LAB | Age: 57
Discharge: HOME | End: 2022-11-30
Payer: COMMERCIAL

## 2022-11-30 ENCOUNTER — HOSPITAL ENCOUNTER (OUTPATIENT)
Dept: NON INVASIVE DIAGNOSTICS | Age: 57
Discharge: HOME | End: 2022-12-02
Payer: COMMERCIAL

## 2022-11-30 DIAGNOSIS — I65.21 OCCLUSION OF RIGHT CAROTID ARTERY: ICD-10-CM

## 2022-11-30 DIAGNOSIS — I51.7 CARDIOMEGALY: ICD-10-CM

## 2022-11-30 PROCEDURE — 93306 TTE W/DOPPLER COMPLETE: CPT

## 2022-11-30 PROCEDURE — 93880 EXTRACRANIAL BILAT STUDY: CPT

## 2022-12-21 ENCOUNTER — TELEPHONE (OUTPATIENT)
Dept: ORTHOPEDIC SURGERY | Age: 57
End: 2022-12-21

## 2022-12-21 DIAGNOSIS — M17.12 ARTHRITIS OF LEFT KNEE: ICD-10-CM

## 2022-12-21 RX ORDER — DICLOFENAC SODIUM 75 MG/1
75 TABLET, DELAYED RELEASE ORAL 2 TIMES DAILY
Qty: 60 TABLET | Refills: 3 | Status: SHIPPED | OUTPATIENT
Start: 2022-12-21

## 2023-01-11 ENCOUNTER — HOSPITAL ENCOUNTER (OUTPATIENT)
Age: 58
Setting detail: SPECIMEN
Discharge: HOME | End: 2023-01-11

## 2023-01-11 LAB
ALBUMIN SERPL-MCNC: 4.2 G/DL (ref 3.5–5.2)
ALBUMIN/GLOBULIN RATIO: 1.8 (ref 1–2.5)
ALP BLD-CCNC: 47 U/L (ref 40–129)
ALT SERPL-CCNC: 25 U/L (ref 5–41)
ANION GAP SERPL CALCULATED.3IONS-SCNC: 12 MMOL/L (ref 9–17)
AST SERPL-CCNC: 17 U/L
BILIRUB SERPL-MCNC: 0.4 MG/DL (ref 0.3–1.2)
BUN BLDV-MCNC: 17 MG/DL (ref 6–20)
CALCIUM SERPL-MCNC: 9.2 MG/DL (ref 8.6–10.4)
CHLORIDE BLD-SCNC: 106 MMOL/L (ref 98–107)
CO2: 23 MMOL/L (ref 20–31)
CREAT SERPL-MCNC: 0.6 MG/DL (ref 0.7–1.2)
ESTIMATED AVERAGE GLUCOSE: 103 MG/DL
GFR SERPL CREATININE-BSD FRML MDRD: >60 ML/MIN/1.73M2
GLUCOSE BLD-MCNC: 96 MG/DL (ref 70–99)
HBA1C MFR BLD: 5.2 % (ref 4–6)
HCT VFR BLD CALC: 40.1 % (ref 40.7–50.3)
HEMOGLOBIN: 13.5 G/DL (ref 13–17)
IRON: 79 UG/DL (ref 59–158)
MCH RBC QN AUTO: 30.7 PG (ref 25.2–33.5)
MCHC RBC AUTO-ENTMCNC: 33.7 G/DL (ref 28.4–34.8)
MCV RBC AUTO: 91.1 FL (ref 82.6–102.9)
NRBC AUTOMATED: 0 PER 100 WBC
PDW BLD-RTO: 12.6 % (ref 11.8–14.4)
PLATELET # BLD: 235 K/UL (ref 138–453)
PMV BLD AUTO: 10 FL (ref 8.1–13.5)
POTASSIUM SERPL-SCNC: 4.9 MMOL/L (ref 3.7–5.3)
RBC # BLD: 4.4 M/UL (ref 4.21–5.77)
SODIUM BLD-SCNC: 141 MMOL/L (ref 135–144)
TOTAL PROTEIN: 6.5 G/DL (ref 6.4–8.3)
URIC ACID: 4.9 MG/DL (ref 3.4–7)
VITAMIN B-12: 649 PG/ML (ref 232–1245)
VITAMIN D 25-HYDROXY: 40.9 NG/ML
WBC # BLD: 7.6 K/UL (ref 3.5–11.3)

## 2023-01-25 ENCOUNTER — HOSPITAL ENCOUNTER (OUTPATIENT)
Age: 58
Setting detail: SPECIMEN
Discharge: HOME | End: 2023-01-25

## 2023-01-25 LAB
DATE, STOOL #1: ABNORMAL
DATE, STOOL #2: ABNORMAL
DATE, STOOL #3: ABNORMAL
HEMOCCULT SP1 STL QL: POSITIVE
HEMOCCULT SP2 STL QL: NEGATIVE
HEMOCCULT SP3 STL QL: NEGATIVE
TIME, STOOL #1: ABNORMAL
TIME, STOOL #2: ABNORMAL
TIME, STOOL #3: ABNORMAL

## 2023-02-08 ENCOUNTER — HOSPITAL ENCOUNTER (OUTPATIENT)
Age: 58
Setting detail: SPECIMEN
Discharge: HOME | End: 2023-02-08

## 2023-02-08 LAB
CHOLEST SERPL-MCNC: 160 MG/DL
CHOLESTEROL/HDL RATIO: 4.8
HCT VFR BLD AUTO: 42.1 % (ref 40.7–50.3)
HDLC SERPL-MCNC: 33 MG/DL
HGB BLD-MCNC: 13.9 G/DL (ref 13–17)
IRON SERPL-MCNC: 67 UG/DL (ref 59–158)
LDLC SERPL CALC-MCNC: 96 MG/DL (ref 0–130)
MCH RBC QN AUTO: 30 PG (ref 25.2–33.5)
MCHC RBC AUTO-ENTMCNC: 33 G/DL (ref 28.4–34.8)
MCV RBC AUTO: 90.9 FL (ref 82.6–102.9)
NRBC AUTOMATED: 0 PER 100 WBC
PDW BLD-RTO: 12.6 % (ref 11.8–14.4)
PLATELET # BLD AUTO: 230 K/UL (ref 138–453)
PMV BLD AUTO: 9.6 FL (ref 8.1–13.5)
RBC # BLD: 4.63 M/UL (ref 4.21–5.77)
TRIGL SERPL-MCNC: 156 MG/DL
VIT B12 SERPL-MCNC: 655 PG/ML (ref 232–1245)
WBC # BLD AUTO: 8.3 K/UL (ref 3.5–11.3)

## 2023-09-26 NOTE — PROGRESS NOTES
site  On allopurinol, will recheck uric acid level. - Uric Acid; Future    2. Severe obesity (BMI 35.0-39. 9) with comorbidity ([address])  Advised on weight loss, continue to monitor weight. 3. Dyslipidemia  On statin, will recheck a lipid panel.   - Lipid Panel; Future    4. Alcohol abuse  Has cut down significantly, on folate supplementation. Will recheck vitamin levels today. - Folate; Future  - Vitamin B1; Future    5. Screening for diabetes mellitus  - CBC; Future  - Comprehensive Metabolic Panel; Future  - Hemoglobin A1C; Future    6. Screening for HIV (human immunodeficiency virus)  - HIV Screen; Future    7. Encounter for hepatitis C screening test for low risk patient  - Hepatitis C Antibody; Future    8. Primary hypertension  BP not at goal. His pulse is in the high 50s with his current dose of carvedilol. In light of this, will start lisinopril 20mg and reassess in 1 month. - lisinopril (PRINIVIL;ZESTRIL) 20 MG tablet; Take 1 tablet by mouth daily  Dispense: 90 tablet; Refill: 1      Return in about 1 month (around 10/27/2023) for f/u HTN.     COMMUNICATION:       Electronically signed by Madelin Gross MD on 9/27/2023 at 4:53 PM

## 2023-09-27 ENCOUNTER — OFFICE VISIT (OUTPATIENT)
Dept: FAMILY MEDICINE CLINIC | Age: 58
End: 2023-09-27
Payer: COMMERCIAL

## 2023-09-27 VITALS
DIASTOLIC BLOOD PRESSURE: 79 MMHG | BODY MASS INDEX: 37.12 KG/M2 | OXYGEN SATURATION: 95 % | TEMPERATURE: 98.6 F | HEIGHT: 69 IN | SYSTOLIC BLOOD PRESSURE: 150 MMHG | HEART RATE: 58 BPM | WEIGHT: 250.6 LBS

## 2023-09-27 DIAGNOSIS — M1A.9XX0 CHRONIC GOUT WITHOUT TOPHUS, UNSPECIFIED CAUSE, UNSPECIFIED SITE: Primary | ICD-10-CM

## 2023-09-27 DIAGNOSIS — F10.10 ALCOHOL ABUSE: Chronic | ICD-10-CM

## 2023-09-27 DIAGNOSIS — Z11.59 ENCOUNTER FOR HEPATITIS C SCREENING TEST FOR LOW RISK PATIENT: ICD-10-CM

## 2023-09-27 DIAGNOSIS — E66.01 SEVERE OBESITY (BMI 35.0-39.9) WITH COMORBIDITY (HCC): ICD-10-CM

## 2023-09-27 DIAGNOSIS — I10 PRIMARY HYPERTENSION: ICD-10-CM

## 2023-09-27 DIAGNOSIS — E78.5 DYSLIPIDEMIA: ICD-10-CM

## 2023-09-27 DIAGNOSIS — Z11.4 SCREENING FOR HIV (HUMAN IMMUNODEFICIENCY VIRUS): ICD-10-CM

## 2023-09-27 DIAGNOSIS — Z13.1 SCREENING FOR DIABETES MELLITUS: ICD-10-CM

## 2023-09-27 PROCEDURE — 99204 OFFICE O/P NEW MOD 45 MIN: CPT | Performed by: STUDENT IN AN ORGANIZED HEALTH CARE EDUCATION/TRAINING PROGRAM

## 2023-09-27 PROCEDURE — 3077F SYST BP >= 140 MM HG: CPT | Performed by: STUDENT IN AN ORGANIZED HEALTH CARE EDUCATION/TRAINING PROGRAM

## 2023-09-27 PROCEDURE — 3078F DIAST BP <80 MM HG: CPT | Performed by: STUDENT IN AN ORGANIZED HEALTH CARE EDUCATION/TRAINING PROGRAM

## 2023-09-27 RX ORDER — LISINOPRIL 20 MG/1
20 TABLET ORAL DAILY
Qty: 90 TABLET | Refills: 1 | Status: SHIPPED | OUTPATIENT
Start: 2023-09-27

## 2023-09-27 RX ORDER — MULTIVIT-MIN/IRON/FOLIC ACID/K 18-600-40
CAPSULE ORAL
COMMUNITY

## 2023-09-27 RX ORDER — ATORVASTATIN CALCIUM 20 MG/1
20 TABLET, FILM COATED ORAL DAILY
COMMUNITY

## 2023-09-27 SDOH — ECONOMIC STABILITY: HOUSING INSECURITY
IN THE LAST 12 MONTHS, WAS THERE A TIME WHEN YOU DID NOT HAVE A STEADY PLACE TO SLEEP OR SLEPT IN A SHELTER (INCLUDING NOW)?: NO

## 2023-09-27 SDOH — ECONOMIC STABILITY: FOOD INSECURITY: WITHIN THE PAST 12 MONTHS, THE FOOD YOU BOUGHT JUST DIDN'T LAST AND YOU DIDN'T HAVE MONEY TO GET MORE.: NEVER TRUE

## 2023-09-27 SDOH — ECONOMIC STABILITY: FOOD INSECURITY: WITHIN THE PAST 12 MONTHS, YOU WORRIED THAT YOUR FOOD WOULD RUN OUT BEFORE YOU GOT MONEY TO BUY MORE.: NEVER TRUE

## 2023-09-27 SDOH — ECONOMIC STABILITY: INCOME INSECURITY: HOW HARD IS IT FOR YOU TO PAY FOR THE VERY BASICS LIKE FOOD, HOUSING, MEDICAL CARE, AND HEATING?: NOT HARD AT ALL

## 2023-09-27 ASSESSMENT — PATIENT HEALTH QUESTIONNAIRE - PHQ9
2. FEELING DOWN, DEPRESSED OR HOPELESS: 0
SUM OF ALL RESPONSES TO PHQ QUESTIONS 1-9: 0
1. LITTLE INTEREST OR PLEASURE IN DOING THINGS: 0
SUM OF ALL RESPONSES TO PHQ QUESTIONS 1-9: 0
SUM OF ALL RESPONSES TO PHQ9 QUESTIONS 1 & 2: 0

## 2023-09-27 ASSESSMENT — ENCOUNTER SYMPTOMS
ABDOMINAL PAIN: 0
ABDOMINAL DISTENTION: 0
SHORTNESS OF BREATH: 0
CHEST TIGHTNESS: 0
SORE THROAT: 0

## 2023-10-10 ENCOUNTER — HOSPITAL ENCOUNTER (OUTPATIENT)
Age: 58
Discharge: HOME | End: 2023-10-10
Payer: COMMERCIAL

## 2023-10-10 DIAGNOSIS — M1A.9XX0 CHRONIC GOUT WITHOUT TOPHUS, UNSPECIFIED CAUSE, UNSPECIFIED SITE: ICD-10-CM

## 2023-10-10 DIAGNOSIS — Z11.59 ENCOUNTER FOR HEPATITIS C SCREENING TEST FOR LOW RISK PATIENT: ICD-10-CM

## 2023-10-10 DIAGNOSIS — Z11.4 SCREENING FOR HIV (HUMAN IMMUNODEFICIENCY VIRUS): ICD-10-CM

## 2023-10-10 DIAGNOSIS — E78.5 DYSLIPIDEMIA: ICD-10-CM

## 2023-10-10 DIAGNOSIS — Z13.1 SCREENING FOR DIABETES MELLITUS: ICD-10-CM

## 2023-10-10 DIAGNOSIS — F10.10 ALCOHOL ABUSE: Chronic | ICD-10-CM

## 2023-10-10 LAB
ALBUMIN SERPL-MCNC: 4.6 G/DL (ref 3.5–5.2)
ALBUMIN/GLOB SERPL: 2 {RATIO} (ref 1–2.5)
ALP SERPL-CCNC: 47 U/L (ref 40–129)
ALT SERPL-CCNC: 17 U/L (ref 5–41)
ANION GAP SERPL CALCULATED.3IONS-SCNC: 11 MMOL/L (ref 9–17)
AST SERPL-CCNC: 16 U/L
BILIRUB SERPL-MCNC: 0.6 MG/DL (ref 0.3–1.2)
BUN SERPL-MCNC: 15 MG/DL (ref 6–20)
CALCIUM SERPL-MCNC: 9.4 MG/DL (ref 8.6–10.4)
CHLORIDE SERPL-SCNC: 104 MMOL/L (ref 98–107)
CHOLEST SERPL-MCNC: 150 MG/DL
CHOLESTEROL/HDL RATIO: 3.8
CO2 SERPL-SCNC: 25 MMOL/L (ref 20–31)
CREAT SERPL-MCNC: 0.4 MG/DL (ref 0.7–1.2)
ERYTHROCYTE [DISTWIDTH] IN BLOOD BY AUTOMATED COUNT: 12.2 % (ref 11.8–14.4)
EST. AVERAGE GLUCOSE BLD GHB EST-MCNC: 94 MG/DL
FOLATE SERPL-MCNC: >20 NG/ML
GFR SERPL CREATININE-BSD FRML MDRD: >60 ML/MIN/1.73M2
GLUCOSE SERPL-MCNC: 93 MG/DL (ref 70–99)
HBA1C MFR BLD: 4.9 % (ref 4–6)
HCT VFR BLD AUTO: 43.2 % (ref 40.7–50.3)
HCV AB SERPL QL IA: NONREACTIVE
HDLC SERPL-MCNC: 39 MG/DL
HGB BLD-MCNC: 14.8 G/DL (ref 13–17)
HIV 1+2 AB+HIV1 P24 AG SERPL QL IA: NONREACTIVE
LDLC SERPL CALC-MCNC: 80 MG/DL (ref 0–130)
MCH RBC QN AUTO: 31 PG (ref 25.2–33.5)
MCHC RBC AUTO-ENTMCNC: 34.3 G/DL (ref 28.4–34.8)
MCV RBC AUTO: 90.4 FL (ref 82.6–102.9)
NRBC BLD-RTO: 0 PER 100 WBC
PLATELET # BLD AUTO: 225 K/UL (ref 138–453)
PMV BLD AUTO: 9.5 FL (ref 8.1–13.5)
POTASSIUM SERPL-SCNC: 4.4 MMOL/L (ref 3.7–5.3)
PROT SERPL-MCNC: 6.9 G/DL (ref 6.4–8.3)
RBC # BLD AUTO: 4.78 M/UL (ref 4.21–5.77)
SODIUM SERPL-SCNC: 140 MMOL/L (ref 135–144)
TRIGL SERPL-MCNC: 154 MG/DL
URATE SERPL-MCNC: 3.8 MG/DL (ref 3.4–7)
WBC OTHER # BLD: 8.6 K/UL (ref 3.5–11.3)

## 2023-10-10 PROCEDURE — 83036 HEMOGLOBIN GLYCOSYLATED A1C: CPT

## 2023-10-10 PROCEDURE — 84425 ASSAY OF VITAMIN B-1: CPT

## 2023-10-10 PROCEDURE — 36415 COLL VENOUS BLD VENIPUNCTURE: CPT

## 2023-10-10 PROCEDURE — 87389 HIV-1 AG W/HIV-1&-2 AB AG IA: CPT

## 2023-10-10 PROCEDURE — 85027 COMPLETE CBC AUTOMATED: CPT

## 2023-10-10 PROCEDURE — 80061 LIPID PANEL: CPT

## 2023-10-10 PROCEDURE — 82746 ASSAY OF FOLIC ACID SERUM: CPT

## 2023-10-10 PROCEDURE — 80053 COMPREHEN METABOLIC PANEL: CPT

## 2023-10-10 PROCEDURE — 86803 HEPATITIS C AB TEST: CPT

## 2023-10-10 PROCEDURE — 84550 ASSAY OF BLOOD/URIC ACID: CPT

## 2023-10-13 LAB — VIT B1 PYROPHOSHATE BLD-SCNC: 169 NMOL/L (ref 70–180)

## 2023-10-23 ASSESSMENT — ENCOUNTER SYMPTOMS
SORE THROAT: 0
CHEST TIGHTNESS: 0
SHORTNESS OF BREATH: 0
ABDOMINAL PAIN: 0
ABDOMINAL DISTENTION: 0

## 2023-10-24 ENCOUNTER — OFFICE VISIT (OUTPATIENT)
Dept: FAMILY MEDICINE CLINIC | Age: 58
End: 2023-10-24
Payer: COMMERCIAL

## 2023-10-24 VITALS
HEIGHT: 69 IN | HEART RATE: 61 BPM | WEIGHT: 250.2 LBS | BODY MASS INDEX: 37.06 KG/M2 | DIASTOLIC BLOOD PRESSURE: 84 MMHG | TEMPERATURE: 97.7 F | SYSTOLIC BLOOD PRESSURE: 136 MMHG | OXYGEN SATURATION: 96 %

## 2023-10-24 DIAGNOSIS — Z23 NEED FOR VACCINATION: Primary | ICD-10-CM

## 2023-10-24 DIAGNOSIS — I10 PRIMARY HYPERTENSION: ICD-10-CM

## 2023-10-24 DIAGNOSIS — E78.5 DYSLIPIDEMIA: ICD-10-CM

## 2023-10-24 PROCEDURE — 90746 HEPB VACCINE 3 DOSE ADULT IM: CPT | Performed by: STUDENT IN AN ORGANIZED HEALTH CARE EDUCATION/TRAINING PROGRAM

## 2023-10-24 PROCEDURE — 99214 OFFICE O/P EST MOD 30 MIN: CPT | Performed by: STUDENT IN AN ORGANIZED HEALTH CARE EDUCATION/TRAINING PROGRAM

## 2023-10-24 PROCEDURE — 90471 IMMUNIZATION ADMIN: CPT | Performed by: STUDENT IN AN ORGANIZED HEALTH CARE EDUCATION/TRAINING PROGRAM

## 2023-10-24 PROCEDURE — 3075F SYST BP GE 130 - 139MM HG: CPT | Performed by: STUDENT IN AN ORGANIZED HEALTH CARE EDUCATION/TRAINING PROGRAM

## 2023-10-24 PROCEDURE — 3079F DIAST BP 80-89 MM HG: CPT | Performed by: STUDENT IN AN ORGANIZED HEALTH CARE EDUCATION/TRAINING PROGRAM

## 2023-10-24 RX ORDER — ALLOPURINOL 300 MG/1
300 TABLET ORAL DAILY
Qty: 90 TABLET | Refills: 3 | Status: SHIPPED | OUTPATIENT
Start: 2023-10-24

## 2023-10-24 RX ORDER — ATORVASTATIN CALCIUM 20 MG/1
20 TABLET, FILM COATED ORAL DAILY
Qty: 90 TABLET | Refills: 1 | Status: SHIPPED | OUTPATIENT
Start: 2023-10-24

## 2023-10-24 RX ORDER — PANTOPRAZOLE SODIUM 40 MG/1
40 TABLET, DELAYED RELEASE ORAL DAILY
Qty: 90 TABLET | Refills: 3 | Status: SHIPPED | OUTPATIENT
Start: 2023-10-24

## 2023-10-24 RX ORDER — TRAZODONE HYDROCHLORIDE 100 MG/1
TABLET ORAL
Qty: 60 TABLET | Refills: 2 | Status: SHIPPED | OUTPATIENT
Start: 2023-10-24

## 2023-10-24 RX ORDER — SILDENAFIL 50 MG/1
TABLET, FILM COATED ORAL
Qty: 30 TABLET | Refills: 0 | Status: SHIPPED | OUTPATIENT
Start: 2023-10-24

## 2023-10-24 RX ORDER — ZOSTER VACCINE RECOMBINANT, ADJUVANTED 50 MCG/0.5
0.5 KIT INTRAMUSCULAR SEE ADMIN INSTRUCTIONS
Qty: 0.5 ML | Refills: 0 | Status: SHIPPED | OUTPATIENT
Start: 2023-10-24 | End: 2024-04-21

## 2023-10-24 NOTE — PATIENT INSTRUCTIONS
Patient Education        Learning About the Mediterranean Diet  What is the [address]? The Mediterranean diet is a style of eating rather than a diet plan. It features foods eaten in [facility], [place], [place], and other countries along the [facility]. It emphasizes eating foods like fish, fruits, vegetables, beans, high-fiber breads and whole grains, nuts, and olive oil. This style of eating includes limited red meat, cheese, and sweets. Why choose the Mediterranean diet? A Mediterranean-style diet may improve heart health. It contains more fat than other heart-healthy diets. But the fats are mainly from nuts, unsaturated oils (such as fish oils and olive oil), and certain nut or seed oils (such as canola, soybean, or flaxseed oil). These fats may help protect the heart and blood vessels. How can you get started on the Mediterranean diet? Here are some things you can do to switch to a more Mediterranean way of eating. What to eat  Eat a variety of fruits and vegetables each day, such as grapes, blueberries, tomatoes, broccoli, peppers, figs, olives, spinach, eggplant, beans, lentils, and chickpeas. Eat a variety of whole-grain foods each day, such as oats, brown rice, and whole wheat bread, pasta, and couscous. Eat fish at least 2 times a week. Try tuna, salmon, mackerel, lake trout, herring, or sardines. Eat moderate amounts of low-fat dairy products, such as milk, cheese, or yogurt. Eat moderate amounts of poultry and eggs. Choose healthy (unsaturated) fats, such as nuts, olive oil, and certain nut or seed oils like canola, soybean, and flaxseed. Limit unhealthy (saturated) fats, such as butter, palm oil, and coconut oil. And limit fats found in animal products, such as meat and dairy products made with whole milk. Try to eat red meat only a few times a month in very small amounts. Limit sweets and desserts to only a few times a week.  This includes sugar-sweetened

## 2023-11-15 ENCOUNTER — OFFICE VISIT (OUTPATIENT)
Dept: FAMILY MEDICINE CLINIC | Age: 58
End: 2023-11-15
Payer: COMMERCIAL

## 2023-11-15 VITALS
BODY MASS INDEX: 37.31 KG/M2 | WEIGHT: 249 LBS | SYSTOLIC BLOOD PRESSURE: 132 MMHG | DIASTOLIC BLOOD PRESSURE: 86 MMHG | OXYGEN SATURATION: 98 % | TEMPERATURE: 97.4 F | HEART RATE: 50 BPM

## 2023-11-15 DIAGNOSIS — M54.2 MUSCULOSKELETAL NECK PAIN: Primary | ICD-10-CM

## 2023-11-15 PROCEDURE — 99213 OFFICE O/P EST LOW 20 MIN: CPT | Performed by: NURSE PRACTITIONER

## 2023-11-15 PROCEDURE — 3079F DIAST BP 80-89 MM HG: CPT | Performed by: NURSE PRACTITIONER

## 2023-11-15 PROCEDURE — 3075F SYST BP GE 130 - 139MM HG: CPT | Performed by: NURSE PRACTITIONER

## 2023-11-15 RX ORDER — PREDNISONE 20 MG/1
TABLET ORAL
Qty: 17 TABLET | Refills: 0 | Status: SHIPPED | OUTPATIENT
Start: 2023-11-15 | End: 2023-11-25

## 2023-11-15 RX ORDER — CYCLOBENZAPRINE HCL 5 MG
5 TABLET ORAL 3 TIMES DAILY PRN
Qty: 30 TABLET | Refills: 0 | Status: SHIPPED | OUTPATIENT
Start: 2023-11-15 | End: 2023-11-25

## 2023-11-15 ASSESSMENT — ENCOUNTER SYMPTOMS
VOMITING: 0
SORE THROAT: 0
BACK PAIN: 0
NAUSEA: 0
RHINORRHEA: 0
DIARRHEA: 0
COUGH: 0
CHEST TIGHTNESS: 0
CONSTIPATION: 0
ABDOMINAL PAIN: 0
ABDOMINAL DISTENTION: 0
SHORTNESS OF BREATH: 0

## 2023-11-15 NOTE — PROGRESS NOTES
Pooja Das, APRN-CNP  [address] [place] [address]  [id number] [address], [address] [facility] [address]  Dept: [phone]  Dept Fax: [phone]     PATIENT ID: Jose Angel Hemphill is a 63 y.o. male. HPI:  Established pt here today for an acute visit secondary to left sided neck pain. He relates that he thinks that he slept wrong. His neck pain has been ongoing for about 2 weeks and is worse when turning his head. His range of motion is limited, especially first thing in the morning but loosens up as the day goes on. He denies any numbness or tingling or radiation of the pain down his arm. Pt denies any fever or chills. Pt today denies any HA, chest pain, or SOB. Pt denies any N/V/D/C or abdominal pain. Otherwise pt doing well on current tx and voices no other concerns. My previous office notes, labs and diagnostic studies were reviewed prior to and during encounter. The patient's past medical, surgical, social, and family history as well as current medications and allergies were reviewed as documented in today's encounter by Camilo Armijo, [address] [place] [address].      Current Outpatient Medications on File Prior to Visit   Medication Sig Dispense Refill    sildenafil (VIAGRA) 50 MG tablet TAKE 1 TABLET BY MOUTH AT ONSET OF INTERCOURSE AS NEEDED 30 tablet 0    allopurinol (ZYLOPRIM) 300 MG tablet Take 1 tablet by mouth daily 90 tablet 3    atorvastatin (LIPITOR) 20 MG tablet Take 1 tablet by mouth daily 90 tablet 1    pantoprazole (PROTONIX) 40 MG tablet Take 1 tablet by mouth daily 90 tablet 3    traZODone (DESYREL) 100 MG tablet 2 po qhs 60 tablet 2    Cholecalciferol (VITAMIN D) 50 MCG (2000 UT) CAPS capsule Take by mouth      Misc Natural Products (OSTEO BI-FLEX TRIPLE STRENGTH PO) Take by mouth      lisinopril (PRINIVIL;ZESTRIL) 20 MG tablet Take 1 tablet by mouth daily 90 tablet 1    carvedilol (COREG) 12.5 MG tablet Take 1 tablet by mouth 2 times daily      folic acid

## 2023-11-15 NOTE — PATIENT INSTRUCTIONS
Patient Education        Neck: Exercises  Introduction  Here are some examples of exercises for you to try. The exercises may be suggested for a condition or for rehabilitation. Start each exercise slowly. Ease off the exercises if you start to have pain. You will be told when to start these exercises and which ones will work best for you. How to do the exercises  Neck stretch    This stretch works best if you keep your shoulder down as you lean away from it. To help you remember to do this, start by relaxing your shoulders and lightly holding on to your thighs or your chair. Tilt your head toward your shoulder and hold for 15 to 30 seconds. Let the weight of your head stretch your muscles. If you would like a little added stretch, use your hand to gently and steadily pull your head toward your shoulder. For example, keeping your right shoulder down, lean your head to the left. Repeat 2 to 4 times toward each shoulder. Neck stretch to the diagonal    Sit in a firm chair, or stand up straight. Look straight ahead. If you're standing, keep your feet about hip-width apart. Turn your head slightly toward the direction you will be stretching. Tip your head diagonally, bringing your chin toward your chest. Relax and let the weight of your head stretch your muscles. Hold this position for 15 to 30 seconds. If you would like a little added stretch, use your hand to gently and steadily pull your head forward on the diagonal. For example, to stretch toward the left, use your left hand. Repeat 2 to 4 times. It's a good idea to repeat these steps toward the other side. Dorsal glide stretch    Sit up straight in a firm chair, or stand up straight. If you're standing, keep your feet about hip-width apart. Keep your neck straight, and look straight ahead. Slowly tuck your chin as you glide your head backward over your body. Hold for a count of 6, and then relax for up to 10 seconds. Repeat 2 to 4 times.   Chest and

## 2023-11-29 ENCOUNTER — NURSE ONLY (OUTPATIENT)
Dept: FAMILY MEDICINE CLINIC | Age: 58
End: 2023-11-29
Payer: COMMERCIAL

## 2023-11-29 DIAGNOSIS — Z23 NEED FOR VACCINATION: Primary | ICD-10-CM

## 2023-11-29 PROCEDURE — 90471 IMMUNIZATION ADMIN: CPT | Performed by: STUDENT IN AN ORGANIZED HEALTH CARE EDUCATION/TRAINING PROGRAM

## 2023-11-29 PROCEDURE — 90746 HEPB VACCINE 3 DOSE ADULT IM: CPT | Performed by: STUDENT IN AN ORGANIZED HEALTH CARE EDUCATION/TRAINING PROGRAM

## 2024-01-05 ENCOUNTER — TELEPHONE (OUTPATIENT)
Dept: FAMILY MEDICINE CLINIC | Age: 59
End: 2024-01-05

## 2024-01-05 NOTE — TELEPHONE ENCOUNTER
Patient not feeling the greatest. Patient thinks it might be bronchitis. Symptoms started a little over a week ago.     Symptoms:    - cough   - runny nose    Patient is asking for a Same Day appt.     Please Advise

## 2024-01-05 NOTE — TELEPHONE ENCOUNTER
Pt called back. I offered appts next week, Urgentcare/walkin clinic and to even send a message back to see if one of the providers here today would be willing to see him. He sounded very indecisive and said that he would just wait and see if his cough goes away on its own. I advised that if he changes his mind that he could call us back or that he could utilize an urgentcare over the weekend.

## 2024-01-16 ASSESSMENT — ENCOUNTER SYMPTOMS
ABDOMINAL PAIN: 0
SHORTNESS OF BREATH: 0
ABDOMINAL DISTENTION: 0
SORE THROAT: 0
CHEST TIGHTNESS: 0

## 2024-01-16 NOTE — PROGRESS NOTES
earlier this year for a bee sting with previous PCP. Hasn't gotten shingles shot yet.     ----    He works for [employer] He got different insurance and needs a new PCP today. He was seeing NP at [facility] originally.     His last gout flare was years ago. He is on allopurinol currently.     He had a left TKA 8/31/21 with Dr. Conde. He is managing his knee pain with knee brace and was previously getting Euflexxa injections. Was advised to follow up with ortho as needed for consideration of knee replacement in the future. He is doing OK at this time.    REVIEWED INFORMATION      Allergies   Allergen Reactions    Vicodin [Hydrocodone-Acetaminophen]      itching       Patient Active Problem List   Diagnosis    Displacement of lumbar intervertebral disc without myelopathy    Arthropathy, unspecified, other specified sites    Hyperglycemia    GERD (gastroesophageal reflux disease)    Hypertension    Gout    Dyslipidemia    MARIA D (acute kidney injury) ([facility])    Alcohol abuse    Hypokalemia    Hypophosphatemia    Current tear of meniscus of knee    Primary osteoarthritis of right knee    Scrotal sebaceous cyst    Acute medial meniscus tear of left knee    BPH without urinary obstruction       Past Medical History:   Diagnosis Date    Alcoholism (HCC)     Anxiety     Trazodone per pcp    Arthritis     both knees    Depression     GERD (gastroesophageal reflux disease)     on rx    Gout     on rx    Hyperlipidemia     hx of. No longer on rx.     Hypertension     carvedilol    Irregular heart beat     pt states had irreg beats on ekg and now has stress test ordered per pcp. Will be done at [facility] 8/26/2021    Knee pain, right     Meniscus tear     right    Rosacea     Snores     no cpap    Wellness examination     Stephanie Lyles PA-C last seen July 2021       Past Surgical History:   Procedure Laterality Date    COLONOSCOPY  1/16/2016    COLONOSCOPY      CYST REMOVAL  06/06/2017    scrotal cyst    HERNIA REPAIR

## 2024-01-17 ENCOUNTER — OFFICE VISIT (OUTPATIENT)
Dept: FAMILY MEDICINE CLINIC | Age: 59
End: 2024-01-17
Payer: COMMERCIAL

## 2024-01-17 VITALS
HEART RATE: 61 BPM | BODY MASS INDEX: 36.4 KG/M2 | TEMPERATURE: 98.2 F | HEIGHT: 69 IN | WEIGHT: 245.8 LBS | DIASTOLIC BLOOD PRESSURE: 76 MMHG | OXYGEN SATURATION: 96 % | SYSTOLIC BLOOD PRESSURE: 171 MMHG

## 2024-01-17 DIAGNOSIS — M54.2 CERVICALGIA: Primary | ICD-10-CM

## 2024-01-17 DIAGNOSIS — I10 PRIMARY HYPERTENSION: ICD-10-CM

## 2024-01-17 PROBLEM — N40.0 BPH WITHOUT URINARY OBSTRUCTION: Status: ACTIVE | Noted: 2024-01-17

## 2024-01-17 PROCEDURE — 99214 OFFICE O/P EST MOD 30 MIN: CPT | Performed by: STUDENT IN AN ORGANIZED HEALTH CARE EDUCATION/TRAINING PROGRAM

## 2024-01-17 PROCEDURE — 3078F DIAST BP <80 MM HG: CPT | Performed by: STUDENT IN AN ORGANIZED HEALTH CARE EDUCATION/TRAINING PROGRAM

## 2024-01-17 PROCEDURE — 3077F SYST BP >= 140 MM HG: CPT | Performed by: STUDENT IN AN ORGANIZED HEALTH CARE EDUCATION/TRAINING PROGRAM

## 2024-01-17 RX ORDER — LISINOPRIL 40 MG/1
40 TABLET ORAL DAILY
Qty: 30 TABLET | Refills: 2 | Status: SHIPPED | OUTPATIENT
Start: 2024-01-17

## 2024-01-17 ASSESSMENT — PATIENT HEALTH QUESTIONNAIRE - PHQ9
1. LITTLE INTEREST OR PLEASURE IN DOING THINGS: 0
2. FEELING DOWN, DEPRESSED OR HOPELESS: 0
SUM OF ALL RESPONSES TO PHQ QUESTIONS 1-9: 0
SUM OF ALL RESPONSES TO PHQ QUESTIONS 1-9: 0
SUM OF ALL RESPONSES TO PHQ9 QUESTIONS 1 & 2: 0
SUM OF ALL RESPONSES TO PHQ QUESTIONS 1-9: 0
SUM OF ALL RESPONSES TO PHQ QUESTIONS 1-9: 0

## 2024-01-17 ASSESSMENT — ENCOUNTER SYMPTOMS: COUGH: 1

## 2024-02-12 NOTE — PROGRESS NOTES
[facility]  [facility]  [address]. DOM MULLEN  [address], [address]  [place] [address]      Date of Visit:  2024  Patient Name: Lakhwinder James   Patient :  1965     CHIEF COMPLAINT:     Lakhwinder James is a 59 y.o. male who presents today for an general visit to be evaluated for the following condition(s):  Chief Complaint   Patient presents with    1 Month Follow-Up    Neck Pain       REVIEW OF SYSTEM      Review of Systems   Constitutional:  Negative for chills and fever.   HENT:  Negative for congestion, postnasal drip and sore throat.    Respiratory:  Negative for chest tightness and shortness of breath.    Cardiovascular:  Negative for chest pain.   Gastrointestinal:  Negative for abdominal distention and abdominal pain.   Genitourinary:  Negative for difficulty urinating.       HISTORY OF PRESENT ILLNESS     58M PMH HTN, GERD, alcohol abuse, HLD, gout, insomnia, ED here for follow up.    His neck pain is responding to the home stretches and he would like to continue with this.    He has been taking the higher dose lisinopril at 40mg with his coreg but his blood pressure remains elevated.     ACTIVE PROBLEM LIST:  Insomnia: on trazodone  GERD: on protonix  Alcohol abuse: on thiamine, folic acid  Gout: on allopurinol  Left knee OA: on   ED: on sildenafil   HTN: on carvedilol, lisinopril    ----    His cough has improved from 2 weeks ago. Having some phlegm that has been off white and light yellow in color. No blood. Has taken OTC honey with some relief. Hasn't tried mucinex yet. No fever, chills, sore throat, SOB, wheezing with his cough.     Having chronic left sided neck pain. Maybe due to his neck posture while on pillow at night. Pain with rotating neck. Has been going on since last month. He got relief with prednisone taper and flexeril, pain is down to 1-2/10 but still there.    ----    His labs showed mildly elevated cholesterol but were

## 2024-02-13 ENCOUNTER — OFFICE VISIT (OUTPATIENT)
Dept: FAMILY MEDICINE CLINIC | Age: 59
End: 2024-02-13
Payer: COMMERCIAL

## 2024-02-13 VITALS
HEIGHT: 69 IN | BODY MASS INDEX: 36.58 KG/M2 | OXYGEN SATURATION: 98 % | DIASTOLIC BLOOD PRESSURE: 102 MMHG | HEART RATE: 56 BPM | SYSTOLIC BLOOD PRESSURE: 174 MMHG | TEMPERATURE: 98.8 F | WEIGHT: 247 LBS

## 2024-02-13 DIAGNOSIS — R05.2 SUBACUTE COUGH: ICD-10-CM

## 2024-02-13 DIAGNOSIS — I10 PRIMARY HYPERTENSION: Primary | ICD-10-CM

## 2024-02-13 DIAGNOSIS — M54.2 CERVICALGIA: ICD-10-CM

## 2024-02-13 PROCEDURE — 99214 OFFICE O/P EST MOD 30 MIN: CPT | Performed by: STUDENT IN AN ORGANIZED HEALTH CARE EDUCATION/TRAINING PROGRAM

## 2024-02-13 PROCEDURE — 3079F DIAST BP 80-89 MM HG: CPT | Performed by: STUDENT IN AN ORGANIZED HEALTH CARE EDUCATION/TRAINING PROGRAM

## 2024-02-13 PROCEDURE — 3077F SYST BP >= 140 MM HG: CPT | Performed by: STUDENT IN AN ORGANIZED HEALTH CARE EDUCATION/TRAINING PROGRAM

## 2024-02-13 RX ORDER — AMLODIPINE BESYLATE 5 MG/1
5 TABLET ORAL DAILY
Qty: 30 TABLET | Refills: 0 | Status: SHIPPED | OUTPATIENT
Start: 2024-02-13

## 2024-02-13 RX ORDER — BENZONATATE 100 MG/1
100-200 CAPSULE ORAL 3 TIMES DAILY PRN
Qty: 60 CAPSULE | Refills: 0 | Status: SHIPPED | OUTPATIENT
Start: 2024-02-13 | End: 2024-02-23

## 2024-03-01 RX ORDER — SILDENAFIL 50 MG/1
TABLET, FILM COATED ORAL
Qty: 30 TABLET | Refills: 0 | Status: SHIPPED | OUTPATIENT
Start: 2024-03-01

## 2024-03-08 RX ORDER — TRAZODONE HYDROCHLORIDE 100 MG/1
TABLET ORAL
Qty: 180 TABLET | Refills: 1 | Status: SHIPPED | OUTPATIENT
Start: 2024-03-08

## 2024-03-11 ASSESSMENT — ENCOUNTER SYMPTOMS
ABDOMINAL PAIN: 0
ABDOMINAL DISTENTION: 0
SHORTNESS OF BREATH: 0
CHEST TIGHTNESS: 0
SORE THROAT: 0

## 2024-03-11 NOTE — PROGRESS NOTES
Catheter inserted. No   Housing Stability: Unknown (9/27/2023)    Housing Stability Vital Sign     Unstable Housing in the Last Year: No        Family History   Problem Relation Age of Onset    Diabetes Brother     Cancer Father         prostate    Arthritis Mother        PHYSICAL EXAM     BP (!) 142/76 (Site: Left Upper Arm, Position: Sitting, Cuff Size: Small Adult)   Pulse 52   Resp 16   Ht 1.727 m (5' 8\")   Wt 112 kg (247 lb)   SpO2 100%   BMI 37.56 kg/m²    Physical Exam  Constitutional:       Appearance: Normal appearance.   HENT:      Head: Atraumatic.   Cardiovascular:      Rate and Rhythm: Normal rate and regular rhythm.      Pulses: Normal pulses.      Heart sounds: Normal heart sounds. No murmur heard.     No friction rub. No gallop.   Pulmonary:      Effort: Pulmonary effort is normal. No respiratory distress.      Breath sounds: Normal breath sounds.   Neurological:      Mental Status: He is alert.   Psychiatric:         Mood and Affect: Mood normal.         ASSESSMENT/PLAN     1. Primary hypertension  BP Improving but not at goal, will increase amlodipine from 5 to 10mg and reassess in 1 month.   - amLODIPine (NORVASC) 10 MG tablet; Take 1 tablet by mouth daily  Dispense: 30 tablet; Refill: 1    Return in about 1 month (around 4/13/2024) for HTN .    COMMUNICATION:       Electronically signed by Rc Armando MD on 3/13/2024 at 4:33 PM

## 2024-03-13 ENCOUNTER — OFFICE VISIT (OUTPATIENT)
Dept: FAMILY MEDICINE CLINIC | Age: 59
End: 2024-03-13
Payer: COMMERCIAL

## 2024-03-13 VITALS
BODY MASS INDEX: 37.44 KG/M2 | DIASTOLIC BLOOD PRESSURE: 76 MMHG | HEIGHT: 68 IN | WEIGHT: 247 LBS | SYSTOLIC BLOOD PRESSURE: 142 MMHG | RESPIRATION RATE: 16 BRPM | HEART RATE: 52 BPM | OXYGEN SATURATION: 100 %

## 2024-03-13 DIAGNOSIS — I10 PRIMARY HYPERTENSION: Primary | ICD-10-CM

## 2024-03-13 PROCEDURE — 3077F SYST BP >= 140 MM HG: CPT | Performed by: STUDENT IN AN ORGANIZED HEALTH CARE EDUCATION/TRAINING PROGRAM

## 2024-03-13 PROCEDURE — 99213 OFFICE O/P EST LOW 20 MIN: CPT | Performed by: STUDENT IN AN ORGANIZED HEALTH CARE EDUCATION/TRAINING PROGRAM

## 2024-03-13 PROCEDURE — 3078F DIAST BP <80 MM HG: CPT | Performed by: STUDENT IN AN ORGANIZED HEALTH CARE EDUCATION/TRAINING PROGRAM

## 2024-03-13 RX ORDER — AMLODIPINE BESYLATE 10 MG/1
10 TABLET ORAL DAILY
Qty: 30 TABLET | Refills: 1 | Status: SHIPPED | OUTPATIENT
Start: 2024-03-13

## 2024-05-07 ASSESSMENT — ENCOUNTER SYMPTOMS
ABDOMINAL DISTENTION: 0
CHEST TIGHTNESS: 0
SHORTNESS OF BREATH: 0
SORE THROAT: 0
ABDOMINAL PAIN: 0

## 2024-05-07 NOTE — PROGRESS NOTES
[facility]  [facility]  [address]. DOM MULLEN  [address], [address]  [place] [id number]      Date of Visit:  2024  Patient Name: Lakhwinder James   Patient :  1965     CHIEF COMPLAINT:     Lakhwinder James is a 59 y.o. male who presents today for an general visit to be evaluated for the following condition(s):  Chief Complaint   Patient presents with    Hypertension     Follow up    Medication Refill       REVIEW OF SYSTEM      Review of Systems   Constitutional:  Negative for chills and fever.   HENT:  Negative for congestion, postnasal drip and sore throat.    Respiratory:  Negative for chest tightness and shortness of breath.    Cardiovascular:  Negative for chest pain.   Gastrointestinal:  Negative for abdominal distention and abdominal pain.   Genitourinary:  Negative for difficulty urinating.       HISTORY OF PRESENT ILLNESS     59M PMH HTN, GERD, alcohol abuse, HLD, gout, insomnia, ED here for follow up.    ACTIVE PROBLEM LIST:  Insomnia: on trazodone  GERD: on protonix  Alcohol abuse: on thiamine, folic acid  Gout: on allopurinol  Left knee OA: on   ED: on sildenafil   HTN: on carvedilol, lisinopril, amlodipine    He is taking the higher dose amlodipine but his blood pressure remains elevated. He denies feeling fatigued or a history of snoring per what his girlfriend tells him.     ----    He is tolerating the amlodipine OK. No side effects.     His neck pain has resolved with the stretches he has been doing at home.     ----    His neck pain is responding to the home stretches and he would like to continue with this.    He has been taking the higher dose lisinopril at 40mg with his coreg but his blood pressure remains elevated.     ----    His cough has improved from 2 weeks ago. Having some phlegm that has been off white and light yellow in color. No blood. Has taken OTC honey with some relief. Hasn't tried mucinex yet. No fever, chills, sore

## 2024-05-08 ENCOUNTER — OFFICE VISIT (OUTPATIENT)
Dept: FAMILY MEDICINE CLINIC | Age: 59
End: 2024-05-08
Payer: COMMERCIAL

## 2024-05-08 VITALS
RESPIRATION RATE: 16 BRPM | SYSTOLIC BLOOD PRESSURE: 161 MMHG | BODY MASS INDEX: 37.13 KG/M2 | TEMPERATURE: 99.3 F | WEIGHT: 245 LBS | HEIGHT: 68 IN | DIASTOLIC BLOOD PRESSURE: 85 MMHG | HEART RATE: 69 BPM | OXYGEN SATURATION: 98 %

## 2024-05-08 DIAGNOSIS — I10 PRIMARY HYPERTENSION: ICD-10-CM

## 2024-05-08 PROCEDURE — 3077F SYST BP >= 140 MM HG: CPT | Performed by: STUDENT IN AN ORGANIZED HEALTH CARE EDUCATION/TRAINING PROGRAM

## 2024-05-08 PROCEDURE — 3079F DIAST BP 80-89 MM HG: CPT | Performed by: STUDENT IN AN ORGANIZED HEALTH CARE EDUCATION/TRAINING PROGRAM

## 2024-05-08 PROCEDURE — 99213 OFFICE O/P EST LOW 20 MIN: CPT | Performed by: STUDENT IN AN ORGANIZED HEALTH CARE EDUCATION/TRAINING PROGRAM

## 2024-05-08 RX ORDER — AMLODIPINE BESYLATE 10 MG/1
10 TABLET ORAL DAILY
Qty: 90 TABLET | Refills: 1 | Status: SHIPPED | OUTPATIENT
Start: 2024-05-08

## 2024-05-08 RX ORDER — HYDROCHLOROTHIAZIDE 25 MG/1
25 TABLET ORAL EVERY MORNING
Qty: 30 TABLET | Refills: 0 | Status: SHIPPED | OUTPATIENT
Start: 2024-05-08

## 2024-05-08 RX ORDER — CARVEDILOL 12.5 MG/1
12.5 TABLET ORAL 2 TIMES DAILY
Qty: 180 TABLET | Refills: 1 | Status: SHIPPED | OUTPATIENT
Start: 2024-05-08

## 2024-05-08 RX ORDER — LISINOPRIL 40 MG/1
40 TABLET ORAL DAILY
Qty: 90 TABLET | Refills: 1 | Status: SHIPPED | OUTPATIENT
Start: 2024-05-08

## 2024-05-16 RX ORDER — ATORVASTATIN CALCIUM 20 MG/1
20 TABLET, FILM COATED ORAL DAILY
Qty: 90 TABLET | Refills: 1 | Status: SHIPPED | OUTPATIENT
Start: 2024-05-16

## 2024-06-13 ASSESSMENT — ENCOUNTER SYMPTOMS
ABDOMINAL DISTENTION: 0
SHORTNESS OF BREATH: 0
CHEST TIGHTNESS: 0
ABDOMINAL PAIN: 0
SORE THROAT: 0

## 2024-06-13 NOTE — PROGRESS NOTES
Read about the [place] [employer]     GET THE RSV VACCINE AT YOUR PHARMACY    Respiratory syncytial (sin-[facility]ISH[facility]) virus, or RSV, is a common respiratory virus that usually causes mild, cold-like symptoms. Most people recover in a week or two, but RSV can be serious. Infants and older adults are more likely to develop severe RSV and need hospitalization. Vaccines are available to protect older adults from severe RSV. Monoclonal antibody products are available to protect infants and young children from severe RSV.  CDC Recommendations  Adults aged 60 years and older  Adults aged 60 years and older may receive a single dose of RSV vaccine using shared clinical decision-making.        
   GERD (gastroesophageal reflux disease)     on rx    Gout     on rx    Hyperlipidemia     hx of. No longer on rx.     Hypertension     carvedilol    Irregular heart beat     pt states had irreg beats on ekg and now has stress test ordered per pcp. Will be done at [facility] 2021    Knee pain, right     Meniscus tear     right    Rosacea     Snores     no cpap    Wellness examination     Stephanie Lyles PA-C last seen 2021       Past Surgical History:   Procedure Laterality Date    COLONOSCOPY  2016    COLONOSCOPY      CYST REMOVAL  2017    scrotal cyst    HERNIA REPAIR      umbilical approx 2014    INCISION AND DRAINAGE Left 2017    SCROTAL EXCISION SEBACEOUS CYST performed by Faustino Clark MD at [facility] OR    KNEE ARTHROSCOPY Right 10/26/2016    partial medial menisectomy    KNEE ARTHROSCOPY Left 2021    KNEE ARTHROSCOPY PARTIAL MEDIAL MENISCECTOMY performed by Antony Conde DO at [facility] OR    NERVE BLOCK  1/3/14    caudal#1 decadron 10 mg    NERVE BLOCK  14    caudal epidural steroid block #3 decadron 10 mg    NERVE BLOCK  3/5/14    Rt MBNB  #1   decadron 10mg    UPPER GASTROINTESTINAL ENDOSCOPY      diagnosed with ulcers        Social History     Socioeconomic History    Marital status:      Spouse name: None    Number of children: None    Years of education: None    Highest education level: None   Occupational History    Occupation: dietary      Employer: [employer]   Tobacco Use    Smoking status: Former     Types: Cigars     Quit date:      Years since quittin.4    Smokeless tobacco: Former     Types: Chew     Quit date:     Tobacco comments:     smoked 1-2 cigars/yr   Vaping Use    Vaping Use: Never used   Substance and Sexual Activity    Alcohol use: Not Currently     Alcohol/week: 10.0 standard drinks of alcohol     Types: 10 Shots of liquor per week     Comment: alcohol free since 2018    Drug use: Not Currently     Types: Marijuana

## 2024-06-14 ENCOUNTER — OFFICE VISIT (OUTPATIENT)
Dept: FAMILY MEDICINE CLINIC | Age: 59
End: 2024-06-14
Payer: COMMERCIAL

## 2024-06-14 VITALS
WEIGHT: 246.8 LBS | HEART RATE: 58 BPM | BODY MASS INDEX: 37.41 KG/M2 | HEIGHT: 68 IN | SYSTOLIC BLOOD PRESSURE: 142 MMHG | DIASTOLIC BLOOD PRESSURE: 79 MMHG | TEMPERATURE: 98.8 F | OXYGEN SATURATION: 99 % | RESPIRATION RATE: 16 BRPM

## 2024-06-14 DIAGNOSIS — I1A.0 RESISTANT HYPERTENSION: Primary | ICD-10-CM

## 2024-06-14 DIAGNOSIS — I10 PRIMARY HYPERTENSION: ICD-10-CM

## 2024-06-14 PROCEDURE — 3078F DIAST BP <80 MM HG: CPT | Performed by: STUDENT IN AN ORGANIZED HEALTH CARE EDUCATION/TRAINING PROGRAM

## 2024-06-14 PROCEDURE — 99213 OFFICE O/P EST LOW 20 MIN: CPT | Performed by: STUDENT IN AN ORGANIZED HEALTH CARE EDUCATION/TRAINING PROGRAM

## 2024-06-14 PROCEDURE — 3077F SYST BP >= 140 MM HG: CPT | Performed by: STUDENT IN AN ORGANIZED HEALTH CARE EDUCATION/TRAINING PROGRAM

## 2024-06-14 RX ORDER — TRIAMTERENE AND HYDROCHLOROTHIAZIDE 37.5; 25 MG/1; MG/1
1 TABLET ORAL DAILY
Qty: 30 TABLET | Refills: 1 | Status: SHIPPED | OUTPATIENT
Start: 2024-06-14

## 2024-06-14 RX ORDER — HYDROCHLOROTHIAZIDE 25 MG/1
25 TABLET ORAL EVERY MORNING
Qty: 30 TABLET | Refills: 0 | Status: CANCELLED | OUTPATIENT
Start: 2024-06-14

## 2024-07-05 ENCOUNTER — TELEPHONE (OUTPATIENT)
Dept: FAMILY MEDICINE CLINIC | Age: 59
End: 2024-07-05

## 2024-07-05 DIAGNOSIS — I1A.0 RESISTANT HYPERTENSION: Primary | ICD-10-CM

## 2024-07-05 NOTE — TELEPHONE ENCOUNTER
Patient came into [place] office stating he attempted to get US RETROPERITONEAL OBED [[id number]] done but they couldn't get patient scheduled for it.  Patient stated that they needed clarification for the order before getting him scheduled.  Patient would just like PCP to get new order placed for him so that he could get this done.    Patient would like follow up phone call when new order or clarification with Central Scheduling has been done.  Please advise.    [phone]

## 2024-07-11 DIAGNOSIS — I1A.0 RESISTANT HYPERTENSION: ICD-10-CM

## 2024-07-15 DIAGNOSIS — I1A.0 RESISTANT HYPERTENSION: ICD-10-CM

## 2024-07-23 ENCOUNTER — TELEPHONE (OUTPATIENT)
Dept: FAMILY MEDICINE CLINIC | Age: 59
End: 2024-07-23

## 2024-07-23 DIAGNOSIS — I1A.0 RESISTANT HYPERTENSION: Primary | ICD-10-CM

## 2024-07-23 NOTE — TELEPHONE ENCOUNTER
Central scheduling called in stating that US retroperitoneal needs to be changed to Vascular renal arterial. Pended.

## 2024-07-24 ENCOUNTER — HOSPITAL ENCOUNTER (OUTPATIENT)
Dept: VASCULAR LAB | Age: 59
Discharge: HOME | End: 2024-07-26
Payer: COMMERCIAL

## 2024-07-24 DIAGNOSIS — I1A.0 RESISTANT HYPERTENSION: ICD-10-CM

## 2024-07-24 LAB
VAS AORTA PROX PSV: 116.7 CM/S
VAS L RENAL ORIG RI: 0.77
VAS LEFT KIDNEY LENGTH: 13.57 CM
VAS LEFT RENAL DIST EDV: 57.9 CM/S
VAS LEFT RENAL DIST PSV: 209.9 CM/S
VAS LEFT RENAL DIST RI: 0.72
VAS LEFT RENAL MID EDV: 74 CM/S
VAS LEFT RENAL MID PSV: 327.8 CM/S
VAS LEFT RENAL MID RI: 0.77
VAS LEFT RENAL ORIGIN EDV: 50.6 CM/S
VAS LEFT RENAL ORIGIN PSV: 223.3 CM/S
VAS LEFT RENAL PROX EDV: 70.2 CM/S
VAS LEFT RENAL PROX PSV: 258.6 CM/S
VAS LEFT RENAL PROX RI: 0.73
VAS RIGHT KIDNEY LENGTH: 12.45 CM
VAS RIGHT RENAL DIST EDV: 44.3 CM/S
VAS RIGHT RENAL DIST PSV: 123.4 CM/S
VAS RIGHT RENAL DIST RI: 0.64 NO UNITS
VAS RIGHT RENAL MID EDV: 31.2 CM/S
VAS RIGHT RENAL MID PSV: 143.1 CM/S
VAS RIGHT RENAL MID RI: 0.78 NO UNITS
VAS RIGHT RENAL ORIGIN EDV: 40 CM/S
VAS RIGHT RENAL ORIGIN PSV: 160.5 CM/S
VAS RIGHT RENAL ORIGIN RI: 0.75
VAS RIGHT RENAL PROX EDV: 74.9 CM/S
VAS RIGHT RENAL PROX PSV: 217.4 CM/S
VAS RIGHT RENAL PROX RI: 0.66 NO UNITS

## 2024-07-24 PROCEDURE — 93975 VASCULAR STUDY: CPT

## 2024-07-24 PROCEDURE — 93975 VASCULAR STUDY: CPT | Performed by: SURGERY

## 2024-08-05 PROBLEM — I70.1: Status: ACTIVE | Noted: 2024-08-05

## 2024-08-05 ASSESSMENT — ENCOUNTER SYMPTOMS
ABDOMINAL PAIN: 0
SORE THROAT: 0
CHEST TIGHTNESS: 0
ABDOMINAL DISTENTION: 0
SHORTNESS OF BREATH: 0

## 2024-08-05 NOTE — PROGRESS NOTES
and Sexual Activity    Alcohol use: Not Currently     Alcohol/week: 10.0 standard drinks of alcohol     Types: 10 Shots of liquor per week     Comment: alcohol free since 2018    Drug use: Not Currently     Types: Marijuana (Weed)     Comment: quit 2018    Sexual activity: Yes     Partners: Female     Social Determinants of Health     Financial Resource Strain: Low Risk  (9/27/2023)    Overall Financial Resource Strain (CARDIA)     Difficulty of Paying Living Expenses: Not hard at all   Transportation Needs: Unknown (9/27/2023)    PRAPARE - Transportation     Lack of Transportation (Non-Medical): No   Housing Stability: Unknown (9/27/2023)    Housing Stability Vital Sign     Unstable Housing in the Last Year: No        Family History   Problem Relation Age of Onset    Diabetes Brother     Cancer Father         prostate    Arthritis Mother        PHYSICAL EXAM     /72   Pulse 58   Temp 98.1 °F (36.7 °C)   Resp 16   Ht 1.727 m (5' 8\")   Wt 112.5 kg (248 lb)   SpO2 99%   BMI 37.71 kg/m²    Physical Exam  Constitutional:       Appearance: Normal appearance.   HENT:      Head: Atraumatic.   Cardiovascular:      Rate and Rhythm: Normal rate and regular rhythm.      Pulses: Normal pulses.      Heart sounds: Normal heart sounds. No murmur heard.     No friction rub. No gallop.   Pulmonary:      Effort: Pulmonary effort is normal. No respiratory distress.      Breath sounds: Normal breath sounds.   Neurological:      Mental Status: He is alert.   Psychiatric:         Mood and Affect: Mood normal.         ASSESSMENT/PLAN     1. Unilateral atherosclerotic renal artery stenosis (HCC)  His blood pressure is at goal with medication, will continue to monitor for now.     2. Resistant hypertension  - Basic Metabolic Panel; Future    3. Primary hypertension  His BP is at goal, will continue with current regimen and obtain a BMP.   - triamterene-hydroCHLOROthiazide (MAXZIDE-25) 37.5-25 MG per tablet; Take 1 tablet by

## 2024-08-06 ENCOUNTER — OFFICE VISIT (OUTPATIENT)
Dept: FAMILY MEDICINE CLINIC | Age: 59
End: 2024-08-06
Payer: COMMERCIAL

## 2024-08-06 VITALS
HEART RATE: 58 BPM | DIASTOLIC BLOOD PRESSURE: 72 MMHG | TEMPERATURE: 98.1 F | HEIGHT: 68 IN | RESPIRATION RATE: 16 BRPM | SYSTOLIC BLOOD PRESSURE: 128 MMHG | BODY MASS INDEX: 37.59 KG/M2 | WEIGHT: 248 LBS | OXYGEN SATURATION: 99 %

## 2024-08-06 DIAGNOSIS — I1A.0 RESISTANT HYPERTENSION: ICD-10-CM

## 2024-08-06 DIAGNOSIS — I10 PRIMARY HYPERTENSION: ICD-10-CM

## 2024-08-06 DIAGNOSIS — I70.1: Primary | ICD-10-CM

## 2024-08-06 DIAGNOSIS — F10.10 ALCOHOL ABUSE: Chronic | ICD-10-CM

## 2024-08-06 PROCEDURE — 99214 OFFICE O/P EST MOD 30 MIN: CPT | Performed by: STUDENT IN AN ORGANIZED HEALTH CARE EDUCATION/TRAINING PROGRAM

## 2024-08-06 PROCEDURE — 3074F SYST BP LT 130 MM HG: CPT | Performed by: STUDENT IN AN ORGANIZED HEALTH CARE EDUCATION/TRAINING PROGRAM

## 2024-08-06 PROCEDURE — 3078F DIAST BP <80 MM HG: CPT | Performed by: STUDENT IN AN ORGANIZED HEALTH CARE EDUCATION/TRAINING PROGRAM

## 2024-08-06 RX ORDER — FOLIC ACID 1 MG/1
1 TABLET ORAL DAILY
Qty: 90 TABLET | Refills: 3 | Status: SHIPPED | OUTPATIENT
Start: 2024-08-06

## 2024-08-06 RX ORDER — TRIAMTERENE AND HYDROCHLOROTHIAZIDE 37.5; 25 MG/1; MG/1
1 TABLET ORAL DAILY
Qty: 90 TABLET | Refills: 1 | Status: SHIPPED | OUTPATIENT
Start: 2024-08-06

## 2024-08-19 DIAGNOSIS — I1A.0 RESISTANT HYPERTENSION: ICD-10-CM

## 2024-08-19 LAB
BUN BLDV-MCNC: 18 MG/DL
CALCIUM SERPL-MCNC: 9.6 MG/DL
CHLORIDE BLD-SCNC: 104 MMOL/L
CO2: 24 MMOL/L
CREAT SERPL-MCNC: 0.83 MG/DL
EGFR: 101
GLUCOSE BLD-MCNC: 102 MG/DL
POTASSIUM SERPL-SCNC: 4.8 MMOL/L
SODIUM BLD-SCNC: 139 MMOL/L

## 2024-08-30 RX ORDER — ALLOPURINOL 300 MG/1
300 TABLET ORAL DAILY
Qty: 90 TABLET | Refills: 3 | Status: SHIPPED | OUTPATIENT
Start: 2024-08-30

## 2024-08-30 RX ORDER — SILDENAFIL 50 MG/1
TABLET, FILM COATED ORAL
Qty: 30 TABLET | Refills: 0 | Status: SHIPPED | OUTPATIENT
Start: 2024-08-30

## 2024-09-24 RX ORDER — TRAZODONE HYDROCHLORIDE 100 MG/1
TABLET ORAL
Qty: 180 TABLET | Refills: 0 | Status: SHIPPED | OUTPATIENT
Start: 2024-09-24

## 2024-09-30 ENCOUNTER — OFFICE VISIT (OUTPATIENT)
Dept: FAMILY MEDICINE CLINIC | Age: 59
End: 2024-09-30
Payer: COMMERCIAL

## 2024-09-30 VITALS
HEIGHT: 68 IN | OXYGEN SATURATION: 97 % | WEIGHT: 243.6 LBS | BODY MASS INDEX: 36.92 KG/M2 | DIASTOLIC BLOOD PRESSURE: 78 MMHG | SYSTOLIC BLOOD PRESSURE: 132 MMHG | HEART RATE: 65 BPM | TEMPERATURE: 97.7 F | RESPIRATION RATE: 16 BRPM

## 2024-09-30 DIAGNOSIS — I70.1: ICD-10-CM

## 2024-09-30 DIAGNOSIS — R73.9 HYPERGLYCEMIA: ICD-10-CM

## 2024-09-30 DIAGNOSIS — E78.2 MIXED HYPERLIPIDEMIA: ICD-10-CM

## 2024-09-30 DIAGNOSIS — I10 ESSENTIAL HYPERTENSION: Primary | ICD-10-CM

## 2024-09-30 DIAGNOSIS — F51.01 PRIMARY INSOMNIA: ICD-10-CM

## 2024-09-30 DIAGNOSIS — Z79.899 ON STATIN THERAPY: ICD-10-CM

## 2024-09-30 DIAGNOSIS — M54.31 SCIATICA OF RIGHT SIDE: ICD-10-CM

## 2024-09-30 DIAGNOSIS — M1A.9XX0 CHRONIC GOUT WITHOUT TOPHUS, UNSPECIFIED CAUSE, UNSPECIFIED SITE: ICD-10-CM

## 2024-09-30 DIAGNOSIS — Z12.5 PROSTATE CANCER SCREENING: ICD-10-CM

## 2024-09-30 DIAGNOSIS — E55.9 VITAMIN D DEFICIENCY: ICD-10-CM

## 2024-09-30 DIAGNOSIS — K21.9 GASTROESOPHAGEAL REFLUX DISEASE WITHOUT ESOPHAGITIS: ICD-10-CM

## 2024-09-30 DIAGNOSIS — Z76.89 ENCOUNTER TO ESTABLISH CARE: ICD-10-CM

## 2024-09-30 PROCEDURE — 99214 OFFICE O/P EST MOD 30 MIN: CPT | Performed by: NURSE PRACTITIONER

## 2024-09-30 PROCEDURE — 3075F SYST BP GE 130 - 139MM HG: CPT | Performed by: NURSE PRACTITIONER

## 2024-09-30 PROCEDURE — 3078F DIAST BP <80 MM HG: CPT | Performed by: NURSE PRACTITIONER

## 2024-09-30 RX ORDER — CLOTRIMAZOLE 1 %
CREAM (GRAM) TOPICAL
Qty: 28 G | Refills: 1 | Status: SHIPPED | OUTPATIENT
Start: 2024-09-30 | End: 2024-10-07

## 2024-09-30 RX ORDER — PREDNISONE 20 MG/1
TABLET ORAL
Qty: 17 TABLET | Refills: 0 | Status: SHIPPED | OUTPATIENT
Start: 2024-09-30 | End: 2024-10-10

## 2024-09-30 SDOH — ECONOMIC STABILITY: FOOD INSECURITY: WITHIN THE PAST 12 MONTHS, YOU WORRIED THAT YOUR FOOD WOULD RUN OUT BEFORE YOU GOT MONEY TO BUY MORE.: NEVER TRUE

## 2024-09-30 SDOH — ECONOMIC STABILITY: INCOME INSECURITY: HOW HARD IS IT FOR YOU TO PAY FOR THE VERY BASICS LIKE FOOD, HOUSING, MEDICAL CARE, AND HEATING?: NOT HARD AT ALL

## 2024-09-30 SDOH — ECONOMIC STABILITY: FOOD INSECURITY: WITHIN THE PAST 12 MONTHS, THE FOOD YOU BOUGHT JUST DIDN'T LAST AND YOU DIDN'T HAVE MONEY TO GET MORE.: NEVER TRUE

## 2024-09-30 ASSESSMENT — ENCOUNTER SYMPTOMS
SHORTNESS OF BREATH: 0
ABDOMINAL PAIN: 0
RHINORRHEA: 0
COUGH: 0
DIARRHEA: 0
CONSTIPATION: 0
VOMITING: 0
ABDOMINAL DISTENTION: 0
CHEST TIGHTNESS: 0
NAUSEA: 0
BACK PAIN: 1
SORE THROAT: 0

## 2024-09-30 NOTE — PROGRESS NOTES
Merary Reyes, APRN-CNP  [facility]  [facility]  [address], [address]  [facility] [address]  Dept: [phone]  Dept Fax: [phone]     PATIENT ID: Lakhwinder James is a 59 y.o. male.    HPI:  Established pt here today for f/u on chronic medical problems; HTN, HLD, gout, GERD, vitamin d def, go over labs and/or diagnostic studies, and medication refills.  He is a previous patient of Dr. Armando, who is no longer with the practice. He is also here to establish care today. Today, he complains of low back pain that started a couple of days ago. He relates that it is radiating down his right leg. Pt denies any fever or chills or loss of bowel or bladder. He has been taking tylenol/ibuprofen, without significant improvement.  Pt today denies any HA, chest pain, or SOB.  Pt denies any N/V/D/C or abdominal pain.  Today, patient denies complaints.  Patient is taking medications as prescribed and is tolerating them well without significant side effects. Patient is doing well on current treatment.     My previous office notes, labs and diagnostic studies were reviewed prior to and during encounter.  The patient's past medical, surgical, social, and family history as well as current medications and allergies were reviewed as documented in today's encounter by MARICRUZ Hernandez.     Current Outpatient Medications on File Prior to Visit   Medication Sig Dispense Refill    traZODone (DESYREL) 100 MG tablet TAKE TWO TABLETS BY MOUTH EVERY NIGHT AT BEDTIME-PT MUST ESTABLISH CARE WITH NEW PROVIDER FOR FURTHER REFILLS. 180 tablet 0    sildenafil (VIAGRA) 50 MG tablet TAKE 1 TABLET BY MOUTH AS NEEDED AT ONSET OF INTERCOURSE 30 tablet 0    allopurinol (ZYLOPRIM) 300 MG tablet TAKE ONE TABLET BY MOUTH ONCE A DAY 90 tablet 3    triamterene-hydroCHLOROthiazide (MAXZIDE-25) 37.5-25 MG per tablet Take 1 tablet by mouth daily 90 tablet 1    folic acid (FOLVITE) 1 MG tablet Take 1

## 2024-09-30 NOTE — PATIENT INSTRUCTIONS
doctor if you are having problems. It's also a good idea to know your test results and keep a list of the medicines you take.  Current as of: July 17, 2023  Content Version: 14.1  © 2006-2024 [employer].   Care instructions adapted under license by [employer]. If you have questions about a medical condition or this instruction, always ask your healthcare professional. [employer] disclaims any warranty or liability for your use of this information.

## 2024-10-25 ENCOUNTER — INITIAL CONSULT (OUTPATIENT)
Dept: VASCULAR SURGERY | Age: 59
End: 2024-10-25
Payer: COMMERCIAL

## 2024-10-25 VITALS
SYSTOLIC BLOOD PRESSURE: 149 MMHG | HEART RATE: 62 BPM | OXYGEN SATURATION: 96 % | WEIGHT: 243 LBS | BODY MASS INDEX: 36.83 KG/M2 | HEIGHT: 68 IN | RESPIRATION RATE: 16 BRPM | DIASTOLIC BLOOD PRESSURE: 81 MMHG

## 2024-10-25 DIAGNOSIS — I70.1 RENAL ARTERY STENOSIS (HCC): Primary | ICD-10-CM

## 2024-10-25 PROCEDURE — 3077F SYST BP >= 140 MM HG: CPT | Performed by: SURGERY

## 2024-10-25 PROCEDURE — 3079F DIAST BP 80-89 MM HG: CPT | Performed by: SURGERY

## 2024-10-25 PROCEDURE — 99203 OFFICE O/P NEW LOW 30 MIN: CPT | Performed by: SURGERY

## 2024-10-25 NOTE — PROGRESS NOTES
1/16/2016    COLONOSCOPY      CYST REMOVAL  06/06/2017    scrotal cyst    HERNIA REPAIR      umbilical approx 2014    INCISION AND DRAINAGE Left 6/7/2017    SCROTAL EXCISION SEBACEOUS CYST performed by Faustino Clark MD at [facility] OR    KNEE ARTHROSCOPY Right 10/26/2016    partial medial menisectomy    KNEE ARTHROSCOPY Left 8/31/2021    KNEE ARTHROSCOPY PARTIAL MEDIAL MENISCECTOMY performed by Antony Conde DO at [facility] OR    NERVE BLOCK  1/3/14    caudal#1 decadron 10 mg    NERVE BLOCK  1-17-14    caudal epidural steroid block #3 decadron 10 mg    NERVE BLOCK  3/5/14    Rt MBNB  #1   decadron 10mg    UPPER GASTROINTESTINAL ENDOSCOPY      diagnosed with ulcers      Review of Systems    Vitals:    10/25/24 1142   BP: (!) 149/81   Site: Right Upper Arm   Position: Sitting   Cuff Size: Medium Adult   Pulse: 62   Resp: 16   SpO2: 96%   Weight: 110.2 kg (243 lb)   Height: 1.727 m (5' 8\")          Physical Exam  On examination he has no lower extremity edema.  He has palpable radial pulses bilaterally as well as palpable femoral, popliteal, dorsalis pedis and posterior tibial pulses.  He has tinea pedis.  His feet are warm and well-perfused without ulceration or gangrene.  He has no carotid bruits.    Assessment:  1. Renal artery stenosis (HCC)          Plan:  At this point I am not convinced that he has significant renal artery stenosis and I do not think that is causing a blood pressure elevation that would warrant treatment with stenting.  For this reason we will watch him at 6-month intervals.  If his velocities change towards further elevation then either CT or arteriography would be useful.  He understands and agrees and we will see him in 6 months.  I recommended also that he see his primary care physician for a prescription for Diflucan for his tinea pedis.    Electronically signed by:  Rc Russo MD

## 2024-10-30 DIAGNOSIS — I10 PRIMARY HYPERTENSION: ICD-10-CM

## 2024-10-30 RX ORDER — AMLODIPINE BESYLATE 10 MG/1
10 TABLET ORAL DAILY
Qty: 90 TABLET | Refills: 1 | Status: SHIPPED | OUTPATIENT
Start: 2024-10-30

## 2024-10-30 RX ORDER — LISINOPRIL 40 MG/1
40 TABLET ORAL DAILY
Qty: 90 TABLET | Refills: 1 | Status: SHIPPED | OUTPATIENT
Start: 2024-10-30

## 2024-10-30 RX ORDER — PANTOPRAZOLE SODIUM 40 MG/1
40 TABLET, DELAYED RELEASE ORAL DAILY
Qty: 90 TABLET | Refills: 1 | Status: SHIPPED | OUTPATIENT
Start: 2024-10-30

## 2024-11-08 ENCOUNTER — TELEPHONE (OUTPATIENT)
Dept: FAMILY MEDICINE CLINIC | Age: 59
End: 2024-11-08

## 2024-11-08 DIAGNOSIS — B35.3 TINEA PEDIS OF BOTH FEET: Primary | ICD-10-CM

## 2024-11-08 RX ORDER — FLUCONAZOLE 150 MG/1
150 TABLET ORAL WEEKLY
Qty: 4 TABLET | Refills: 0 | Status: SHIPPED | OUTPATIENT
Start: 2024-11-08 | End: 2024-11-30

## 2024-11-08 NOTE — TELEPHONE ENCOUNTER
Patient called in stating that PCP referred him to Dr. Russo for his rash on his feet.  At his last appointment with PCP he showed her his left foot and she prescribed a cream for it.  Dr. Russo told him to call PCP to see if she could send in script for Diflucan.  Please advise.    [facility] [place] [place] - [address] -  [phone] - F [phone] [[phone]]

## 2024-11-18 ENCOUNTER — OFFICE VISIT (OUTPATIENT)
Dept: FAMILY MEDICINE CLINIC | Age: 59
End: 2024-11-18
Payer: COMMERCIAL

## 2024-11-18 ENCOUNTER — TELEPHONE (OUTPATIENT)
Dept: FAMILY MEDICINE CLINIC | Age: 59
End: 2024-11-18

## 2024-11-18 VITALS — RESPIRATION RATE: 14 BRPM | DIASTOLIC BLOOD PRESSURE: 60 MMHG | SYSTOLIC BLOOD PRESSURE: 110 MMHG

## 2024-11-18 DIAGNOSIS — M54.40 ACUTE RIGHT-SIDED LOW BACK PAIN WITH SCIATICA, SCIATICA LATERALITY UNSPECIFIED: Primary | ICD-10-CM

## 2024-11-18 PROCEDURE — 3078F DIAST BP <80 MM HG: CPT | Performed by: NURSE PRACTITIONER

## 2024-11-18 PROCEDURE — 99213 OFFICE O/P EST LOW 20 MIN: CPT | Performed by: NURSE PRACTITIONER

## 2024-11-18 PROCEDURE — 96372 THER/PROPH/DIAG INJ SC/IM: CPT | Performed by: NURSE PRACTITIONER

## 2024-11-18 PROCEDURE — 3074F SYST BP LT 130 MM HG: CPT | Performed by: NURSE PRACTITIONER

## 2024-11-18 RX ORDER — CYCLOBENZAPRINE HCL 10 MG
10 TABLET ORAL 3 TIMES DAILY PRN
Qty: 21 TABLET | Refills: 0 | Status: SHIPPED | OUTPATIENT
Start: 2024-11-18 | End: 2024-11-28

## 2024-11-18 RX ORDER — KETOROLAC TROMETHAMINE 30 MG/ML
30 INJECTION, SOLUTION INTRAMUSCULAR; INTRAVENOUS ONCE
Status: COMPLETED | OUTPATIENT
Start: 2024-11-18 | End: 2024-11-18

## 2024-11-18 RX ORDER — PREDNISONE 50 MG/1
50 TABLET ORAL DAILY
Qty: 5 TABLET | Refills: 0 | Status: SHIPPED | OUTPATIENT
Start: 2024-11-18 | End: 2024-11-23

## 2024-11-18 RX ADMIN — KETOROLAC TROMETHAMINE 30 MG: 30 INJECTION, SOLUTION INTRAMUSCULAR; INTRAVENOUS at 10:37

## 2024-11-18 ASSESSMENT — ENCOUNTER SYMPTOMS
SHORTNESS OF BREATH: 0
BACK PAIN: 1
WHEEZING: 0
ABDOMINAL PAIN: 0

## 2024-11-18 NOTE — TELEPHONE ENCOUNTER
Subjective     Doing ok.    Scheduled:   Current Facility-Administered Medications   Medication Dose Route Frequency Provider Last Rate Last Admin   • insulin lispro (ADMELOG,HumaLOG) - Correction Dose   Subcutaneous TID  Stanley Tejeda MD   6 Units at 01/01/23 1219   • carbidopa-levodopa (SINEMET)  MG per tablet 1 tablet  1 tablet Oral TID Stanley Tejeda MD   1 tablet at 01/01/23 1328   • metoCLOPramide (REGLAN) injection 5 mg  5 mg Intravenous Q6H Stanley Tejeda MD   5 mg at 01/01/23 1555   • buPROPion XL (WELLBUTRIN XL) tablet 300 mg  300 mg Oral Daily Stanley Tejeda MD   300 mg at 01/01/23 1005   • triamcinolone (ARISTOCORT) 0.1 % cream   Topical BID Stanley Tejeda MD   Given at 01/01/23 0838   • insulin glargine (LANTUS) injection 10 Units  10 Units Subcutaneous Nightly Stanley Tejeda MD   10 Units at 12/31/22 2200   • lisinopril (ZESTRIL) tablet 40 mg  40 mg Oral Once Josh Reyez MD       • apixaBAN (ELIQUIS) tablet 5 mg  5 mg Oral 2 times per day Mychal Leroy MD   5 mg at 01/01/23 1005   • atorvastatin (LIPITOR) tablet 40 mg  40 mg Oral Nightly Mychal Leroy MD   40 mg at 12/31/22 2157   • aspirin chewable 81 mg  81 mg Oral Daily Mychal Leroy MD   81 mg at 01/01/23 1005   • fenofibrate micronized (LOFIBRA) capsule 134 mg  134 mg Oral Daily with breakfast Mychal Leroy MD   134 mg at 01/01/23 1005   • furosemide (LASIX) tablet 40 mg  40 mg Oral Daily Mychal Leroy MD   40 mg at 01/01/23 1005   • lisinopril (ZESTRIL) tablet 20 mg  20 mg Oral Daily Mychal Leroy MD   20 mg at 01/01/23 1005   • cholecalciferol (VITAMIN D) tablet 25 mcg  25 mcg Oral Daily Stanley Tejeda MD   25 mcg at 01/01/23 1011   • cilostazol (PLETAL) tablet 50 mg  50 mg Oral BID Stanley Tejeda MD   50 mg at 01/01/23 1005   • docusate sodium (COLACE) capsule 100 mg  100 mg Oral BID Stanley Tejeda MD   100 mg at 01/01/23 1004   • escitalopram (LEXAPRO) tablet 10 mg  10 mg Oral  Patient has been having back pain with occasional pain down the right leg pain. Pain started yesterday morning. Thinks it may be sciatica. Last time he had this, he received steroids.     Denies any physical trauma, but played golf this weekend. States he can barely move/stand.    Daily Stanley Tejeda MD   10 mg at 01/01/23 1005   • glipiZIDE (GLUCOTROL) tablet 10 mg  10 mg Oral Daily Stanley Tejeda MD   10 mg at 01/01/23 1005   • mirtazapine (REMERON) tablet 15 mg  15 mg Oral Nightly Stanley Tejeda MD   15 mg at 12/31/22 2157   • pantoprazole (PROTONIX) EC tablet 40 mg  40 mg Oral Daily Stanley Tejeda MD   40 mg at 01/01/23 1006        I/O's    Intake/Output Summary (Last 24 hours) at 1/1/2023 1644  Last data filed at 1/1/2023 1400  Gross per 24 hour   Intake 800 ml   Output --   Net 800 ml       Last Recorded Vitals  Vitals:    01/01/23 0651 01/01/23 1107 01/01/23 1108 01/01/23 1500   BP: 116/68 93/56 101/61 124/64   BP Location: LUE - Left upper extremity LUE - Left upper extremity  LUE - Left upper extremity   Patient Position: Sitting Supine  Supine   Pulse: 69 75 84 69   Resp: 20 20  20   Temp: 98.4 °F (36.9 °C) 98.1 °F (36.7 °C)  98.1 °F (36.7 °C)   TempSrc: Oral Oral  Oral   SpO2: 96% 95%  97%   Weight:       Height:          Body mass index is 17.12 kg/m².    Physical Exam  Constitutional:       Appearance: He is not ill-appearing or diaphoretic.   Cardiovascular:      Rate and Rhythm: Tachycardia present. Rhythm irregular.      Heart sounds: Murmur heard.     No gallop.      Comments: DELLA at the aortic post c/w AS  Pulmonary:      Breath sounds: Examination of the right-lower field reveals decreased breath sounds. Examination of the left-lower field reveals decreased breath sounds. Decreased breath sounds present.          Labs     Recent Labs     12/30/22  1646 12/31/22  0455 01/01/23  0445   SODIUM 133* 138 136   POTASSIUM 3.7 3.4 3.6   CHLORIDE 95* 100 98   CO2 31 31 29   ANIONGAP 11 10 13   GLUCOSE 584* 160* 285*   BUN 14 16 30*   CREATININE 0.67 0.67 1.53*   BCRAT 21 24 20   CALCIUM 9.0 9.0 8.9   BILIRUBIN 0.3  --   --    AST 14  --   --    GPT 23  --   --    ALKPT 89  --   --    TOTPROTEIN 6.9  --   --    ALBUMIN 3.1*  --   --    GLOB 3.8  --   --    AGR 0.8*   --   --        Lab Results   Component Value Date    HTROPI 37 12/30/2022    HTROPI 36 12/30/2022    HTROPI 41 10/07/2022         Recent Labs     12/31/22  0455   WBC 8.9   HGB 15.0   HCT 43.1          Imaging          Assessment :     Chest pain  Medical noncompliance  Depression  coronary artery disease, coronary bypass graft surgery, mitral valve repair for mitral valve regurgitation, stroke, carotid artery stenosis, hyperlipidemia, mild aortic valve stenosis, loop recorder implantation, sick sinus syndrome, paroxysmal atrial fibrillation, peripheral vascular disease, premature ventricular beats, diabetes, first-degree AV block, hypertension, right hip fracture, back surgery, Parkinson disease.     Plan :     Patient presented with chest pain, depression.  He has been noncompliant with his medication leading to severe hypertension.  His chest pain appears noncardiac.  He had recent cardiac work-up in October 2022 which was negative.  His last cardiac catheterization was 2019 which showed patent grafts.  No evidence of myocardial infarction or congestive heart failure.  No further ischemic work-up is needed.  Consider social work evaluation      Alessio Watson,

## 2024-11-18 NOTE — PROGRESS NOTES
[facility]  [address]  [address]  [place] [address]    Lakhwinder James is a 59 y.o. male who presents today for his medical conditions/complaints of Back Pain (R side, lower back travels down right leg)          HPI:     /60   Resp 14       Back Pain  This is a new problem. The current episode started in the past 7 days. The problem occurs constantly. The problem has been gradually worsening since onset. The pain is present in the lumbar spine and gluteal. The quality of the pain is described as aching. The pain radiates to the right thigh. The pain is moderate. The symptoms are aggravated by twisting, standing and position. Pertinent negatives include no abdominal pain, chest pain, fever, numbness or weakness. Risk factors: played 18 holes of golf recently. Pain began the next day. He has tried analgesics for the symptoms. The treatment provided no relief.       Past Medical History:   Diagnosis Date    Alcoholism (HCC)     Anxiety     Trazodone per pcp    Arthritis     both knees    Depression     GERD (gastroesophageal reflux disease)     on rx    Gout     on rx    Hyperlipidemia     hx of. No longer on rx.     Hypertension     carvedilol    Irregular heart beat     pt states had irreg beats on ekg and now has stress test ordered per pcp. Will be done at [facility] 8/26/2021    Knee pain, right     Meniscus tear     right    Rosacea     Snores     no cpap    Wellness examination     Stephanie Lyles PA-C last seen July 2021        Past Surgical History:   Procedure Laterality Date    COLONOSCOPY  1/16/2016    COLONOSCOPY      CYST REMOVAL  06/06/2017    scrotal cyst    HERNIA REPAIR      umbilical approx 2014    INCISION AND DRAINAGE Left 6/7/2017    SCROTAL EXCISION SEBACEOUS CYST performed by Faustino Clark MD at [facility] OR    KNEE ARTHROSCOPY Right 10/26/2016    partial medial menisectomy    KNEE ARTHROSCOPY Left 8/31/2021    KNEE ARTHROSCOPY PARTIAL MEDIAL MENISCECTOMY

## 2024-11-18 NOTE — TELEPHONE ENCOUNTER
Pt does not have mychart. Pt states he called off work today. He is asking if he can be squeeze in today.

## 2024-11-18 NOTE — TELEPHONE ENCOUNTER
Patient states he is not able to do a virtual visit. I did recommend going to a walk in clinic to be seen. I did give him the information for [facility] walk in clinic in [place].    Therapy: CVC supplies, TPN, and IV GCSF  Insurance: [facility]  Ded: $  Met: $    Co-Insurance: 85%  Max Out of Pocket: $3500  Met: $3500    Please contact Intake with any questions, [phone] or In Basket pool, [facility] ([phone]).  In reference to admission date 10/28/18 to check CVC supplies, TPN, and IV GCSF coverage

## 2024-11-20 NOTE — TELEPHONE ENCOUNTER
Pt was given steroid and muscle relaxer. Please have him continue with current tx and if doesn't improve call the office

## 2024-11-20 NOTE — TELEPHONE ENCOUNTER
Patient called back into office stating he is still experiencing back pain going down his leg.  Patient states he went to urgent care on Monday and he received a shot and some muscle relaxers.  Patient is unsure what to do at this point.    I advised about an E-Visit through [employer] but patient states he has an older flip phone and can't access [employer].  Please advise.

## 2024-12-02 DIAGNOSIS — I10 PRIMARY HYPERTENSION: ICD-10-CM

## 2024-12-03 RX ORDER — ATORVASTATIN CALCIUM 20 MG/1
20 TABLET, FILM COATED ORAL DAILY
Qty: 90 TABLET | Refills: 1 | Status: SHIPPED | OUTPATIENT
Start: 2024-12-03

## 2024-12-03 RX ORDER — CARVEDILOL 12.5 MG/1
12.5 TABLET ORAL 2 TIMES DAILY
Qty: 180 TABLET | Refills: 1 | Status: SHIPPED | OUTPATIENT
Start: 2024-12-03

## 2024-12-27 ENCOUNTER — OFFICE VISIT (OUTPATIENT)
Dept: FAMILY MEDICINE CLINIC | Age: 59
End: 2024-12-27
Payer: COMMERCIAL

## 2024-12-27 ENCOUNTER — TELEPHONE (OUTPATIENT)
Dept: FAMILY MEDICINE CLINIC | Age: 59
End: 2024-12-27

## 2024-12-27 VITALS
RESPIRATION RATE: 16 BRPM | HEIGHT: 68 IN | HEART RATE: 58 BPM | OXYGEN SATURATION: 98 % | TEMPERATURE: 97.4 F | WEIGHT: 232 LBS | BODY MASS INDEX: 35.16 KG/M2 | DIASTOLIC BLOOD PRESSURE: 82 MMHG | SYSTOLIC BLOOD PRESSURE: 134 MMHG

## 2024-12-27 DIAGNOSIS — M1A.9XX0 CHRONIC GOUT WITHOUT TOPHUS, UNSPECIFIED CAUSE, UNSPECIFIED SITE: ICD-10-CM

## 2024-12-27 DIAGNOSIS — E78.2 MIXED HYPERLIPIDEMIA: ICD-10-CM

## 2024-12-27 DIAGNOSIS — A08.4 VIRAL GASTROENTERITIS: Primary | ICD-10-CM

## 2024-12-27 DIAGNOSIS — I10 ESSENTIAL HYPERTENSION: ICD-10-CM

## 2024-12-27 DIAGNOSIS — R73.9 HYPERGLYCEMIA: ICD-10-CM

## 2024-12-27 DIAGNOSIS — Z12.5 PROSTATE CANCER SCREENING: ICD-10-CM

## 2024-12-27 LAB
ALBUMIN: 4.2 G/DL
ALP BLD-CCNC: 39 U/L
ALT SERPL-CCNC: 21 U/L
ANION GAP SERPL CALCULATED.3IONS-SCNC: 13 MMOL/L
AST SERPL-CCNC: 18 U/L
BASOPHILS ABSOLUTE: ABNORMAL
BASOPHILS RELATIVE PERCENT: ABNORMAL
BILIRUB SERPL-MCNC: 0.3 MG/DL (ref 0.1–1.4)
BUN BLDV-MCNC: 13 MG/DL
CALCIUM SERPL-MCNC: 9.7 MG/DL
CHLORIDE BLD-SCNC: 105 MMOL/L
CHOLESTEROL, TOTAL: 159 MG/DL
CHOLESTEROL/HDL RATIO: 3.5
CO2: 25 MMOL/L
CREAT SERPL-MCNC: 0.66 MG/DL
EOSINOPHILS ABSOLUTE: ABNORMAL
EOSINOPHILS RELATIVE PERCENT: ABNORMAL
ESTIMATED AVERAGE GLUCOSE: 105
GFR, ESTIMATED: 109
GLUCOSE BLD-MCNC: 97 MG/DL
HBA1C MFR BLD: 5.3 %
HCT VFR BLD CALC: 39.2 % (ref 41–53)
HDLC SERPL-MCNC: 45 MG/DL (ref 35–70)
HEMOGLOBIN: 13.4 G/DL (ref 13.5–17.5)
LDL CHOLESTEROL: 81
LYMPHOCYTES ABSOLUTE: ABNORMAL
LYMPHOCYTES RELATIVE PERCENT: ABNORMAL
MCH RBC QN AUTO: ABNORMAL PG
MCHC RBC AUTO-ENTMCNC: ABNORMAL G/DL
MCV RBC AUTO: ABNORMAL FL
MONOCYTES ABSOLUTE: ABNORMAL
MONOCYTES RELATIVE PERCENT: ABNORMAL
NEUTROPHILS ABSOLUTE: ABNORMAL
NEUTROPHILS RELATIVE PERCENT: ABNORMAL
NONHDLC SERPL-MCNC: NORMAL MG/DL
PLATELET # BLD: 253 K/ΜL
PMV BLD AUTO: ABNORMAL FL
POTASSIUM SERPL-SCNC: 5.2 MMOL/L
PROSTATE SPECIFIC ANTIGEN: 0.81 NG/ML
RBC # BLD: ABNORMAL 10*6/UL
SODIUM BLD-SCNC: 143 MMOL/L
TOTAL PROTEIN: 6.3 G/DL (ref 6.4–8.2)
TRIGL SERPL-MCNC: 163 MG/DL
URIC ACID: 4.1
VLDLC SERPL CALC-MCNC: 33 MG/DL
WBC # BLD: 6.3 10^3/ML

## 2024-12-27 PROCEDURE — 3079F DIAST BP 80-89 MM HG: CPT | Performed by: NURSE PRACTITIONER

## 2024-12-27 PROCEDURE — 99213 OFFICE O/P EST LOW 20 MIN: CPT | Performed by: NURSE PRACTITIONER

## 2024-12-27 PROCEDURE — 3075F SYST BP GE 130 - 139MM HG: CPT | Performed by: NURSE PRACTITIONER

## 2024-12-27 RX ORDER — SILDENAFIL 50 MG/1
50 TABLET, FILM COATED ORAL DAILY PRN
Qty: 30 TABLET | Refills: 3 | Status: SHIPPED | OUTPATIENT
Start: 2024-12-27

## 2024-12-27 RX ORDER — SILDENAFIL 50 MG/1
50 TABLET, FILM COATED ORAL DAILY PRN
Qty: 30 TABLET | Refills: 3 | Status: SHIPPED | OUTPATIENT
Start: 2024-12-27 | End: 2024-12-27

## 2024-12-27 ASSESSMENT — ENCOUNTER SYMPTOMS
NAUSEA: 1
CHEST TIGHTNESS: 0
ABDOMINAL DISTENTION: 0
VOMITING: 0
CONSTIPATION: 0
ABDOMINAL PAIN: 0
SHORTNESS OF BREATH: 0
BACK PAIN: 0
COUGH: 0
DIARRHEA: 1
SORE THROAT: 0
RHINORRHEA: 0

## 2024-12-27 NOTE — PROGRESS NOTES
General: No focal deficit present.      Mental Status: He is alert and oriented to person, place, and time.   Psychiatric:         Mood and Affect: Mood normal.       ASSESSMENT:   Diagnosis Orders   1. Viral gastroenteritis          PLAN:  1. Viral gastroenteritis  - The patient has diarrhea with nausea and vomiting.   - No severe abdominal pain, high fevers or blood in stool.   - Symptoms are improving  - These symptoms are consistent with viral gastroenteritis.   - I have recommended small amounts clear fluids frequently, soups, juices, water, and advance diet as tolerated.   - Call office if symptoms worsen or do not improve at any time     - Rest of systems unchanged, continue current treatments.    - On this date December 27, 2024,  I have spent greater than 50% of this visit reviewing previous notes, test results and/or face to face with the patient discussing the diagnoses, importance of compliance with the treatment plan, counseling, coordinating care as well as documenting on the day of the visit.     Electronically signed by LEIGH ANN Cardoza NP on 12/27/2024 at 4:38 PM

## 2024-12-27 NOTE — TELEPHONE ENCOUNTER
----- Message from LEIGH ANN Stringer NP sent at 12/27/2024  4:01 PM EST -----  Can you call [facility] in [place] for his labs in September

## 2025-01-02 RX ORDER — TRAZODONE HYDROCHLORIDE 100 MG/1
TABLET ORAL
Qty: 180 TABLET | Refills: 0 | Status: SHIPPED | OUTPATIENT
Start: 2025-01-02

## 2025-01-29 ENCOUNTER — TELEPHONE (OUTPATIENT)
Dept: FAMILY MEDICINE CLINIC | Age: 60
End: 2025-01-29

## 2025-01-29 NOTE — TELEPHONE ENCOUNTER
Patient is experiencing abdominal pain. States that it starts in his stomach and wraps around his right side and into his back. 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 He starts off his day fine then it worse. When he is off work it starts to get better. Patient did call off today. Patient was hoping to make an appointment. Patient does not have my chart and is unable to do an evisit.

## 2025-01-30 ENCOUNTER — OFFICE VISIT (OUTPATIENT)
Dept: FAMILY MEDICINE CLINIC | Age: 60
End: 2025-01-30
Payer: COMMERCIAL

## 2025-01-30 ENCOUNTER — HOSPITAL ENCOUNTER (OUTPATIENT)
Dept: GENERAL RADIOLOGY | Age: 60
Discharge: HOME | End: 2025-02-01
Payer: COMMERCIAL

## 2025-01-30 ENCOUNTER — HOSPITAL ENCOUNTER (OUTPATIENT)
Age: 60
Discharge: HOME | End: 2025-02-01
Payer: COMMERCIAL

## 2025-01-30 VITALS
TEMPERATURE: 99.3 F | DIASTOLIC BLOOD PRESSURE: 62 MMHG | HEART RATE: 58 BPM | SYSTOLIC BLOOD PRESSURE: 128 MMHG | WEIGHT: 233 LBS | BODY MASS INDEX: 35.31 KG/M2 | OXYGEN SATURATION: 99 % | HEIGHT: 68 IN | RESPIRATION RATE: 16 BRPM

## 2025-01-30 DIAGNOSIS — R10.9 RIGHT FLANK PAIN: ICD-10-CM

## 2025-01-30 DIAGNOSIS — R10.9 RIGHT FLANK PAIN: Primary | ICD-10-CM

## 2025-01-30 LAB
BILIRUBIN, POC: NORMAL
BLOOD URINE, POC: NORMAL
CLARITY, POC: CLEAR
COLOR, POC: YELLOW
GLUCOSE URINE, POC: NORMAL MG/DL
KETONES, POC: NORMAL MG/DL
LEUKOCYTE EST, POC: NORMAL
NITRITE, POC: NORMAL
PH, POC: 5.5
PROTEIN, POC: NORMAL MG/DL
SPECIFIC GRAVITY, POC: 1.01
UROBILINOGEN, POC: 0.2 MG/DL

## 2025-01-30 PROCEDURE — 3074F SYST BP LT 130 MM HG: CPT | Performed by: NURSE PRACTITIONER

## 2025-01-30 PROCEDURE — 74018 RADEX ABDOMEN 1 VIEW: CPT

## 2025-01-30 PROCEDURE — 99213 OFFICE O/P EST LOW 20 MIN: CPT | Performed by: NURSE PRACTITIONER

## 2025-01-30 PROCEDURE — 81002 URINALYSIS NONAUTO W/O SCOPE: CPT | Performed by: NURSE PRACTITIONER

## 2025-01-30 PROCEDURE — 3078F DIAST BP <80 MM HG: CPT | Performed by: NURSE PRACTITIONER

## 2025-01-30 RX ORDER — TRIAMCINOLONE ACETONIDE 1 MG/G
CREAM TOPICAL 2 TIMES DAILY
Qty: 80 G | Refills: 0 | Status: SHIPPED | OUTPATIENT
Start: 2025-01-30

## 2025-01-30 SDOH — ECONOMIC STABILITY: FOOD INSECURITY: WITHIN THE PAST 12 MONTHS, YOU WORRIED THAT YOUR FOOD WOULD RUN OUT BEFORE YOU GOT MONEY TO BUY MORE.: NEVER TRUE

## 2025-01-30 SDOH — ECONOMIC STABILITY: FOOD INSECURITY: WITHIN THE PAST 12 MONTHS, THE FOOD YOU BOUGHT JUST DIDN'T LAST AND YOU DIDN'T HAVE MONEY TO GET MORE.: NEVER TRUE

## 2025-01-30 ASSESSMENT — ENCOUNTER SYMPTOMS
CHEST TIGHTNESS: 0
VOMITING: 0
ABDOMINAL DISTENTION: 0
BACK PAIN: 0
SORE THROAT: 0
RHINORRHEA: 0
COUGH: 0
CONSTIPATION: 0
DIARRHEA: 0
ABDOMINAL PAIN: 1
NAUSEA: 0
SHORTNESS OF BREATH: 0

## 2025-01-30 ASSESSMENT — PATIENT HEALTH QUESTIONNAIRE - PHQ9
SUM OF ALL RESPONSES TO PHQ QUESTIONS 1-9: 0
2. FEELING DOWN, DEPRESSED OR HOPELESS: NOT AT ALL
SUM OF ALL RESPONSES TO PHQ QUESTIONS 1-9: 0
1. LITTLE INTEREST OR PLEASURE IN DOING THINGS: NOT AT ALL
SUM OF ALL RESPONSES TO PHQ QUESTIONS 1-9: 0
SUM OF ALL RESPONSES TO PHQ QUESTIONS 1-9: 0
SUM OF ALL RESPONSES TO PHQ9 QUESTIONS 1 & 2: 0

## 2025-01-30 NOTE — PROGRESS NOTES
Merary Reyes, APRN-CNP  [facility]  [facility]  [address], [address]  [place] [address]  Dept: [phone]  Dept Fax: [phone]     PATIENT ID: Lakhwinder James is a 60 y.o. male.    HPI:  Established pt here today for an acute visit secondary to right sided abdominal pain with radiation into his right mid back. He relates that the pain starts in the front of his abdomen than radiates to his right flank. 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  He denies pain currently. Pt denies any fever or chills.  Pt today denies any HA, chest pain, or SOB.  Pt denies any N/V/D/C or abdominal pain.  Otherwise pt doing well on current tx and voices no other concerns.      My previous office notes, labs and diagnostic studies were reviewed prior to and during encounter.  The patient's past medical, surgical, social, and family history as well as current medications and allergies were reviewed as documented in today's encounter by MARICRUZ Hernandez.     Current Outpatient Medications on File Prior to Visit   Medication Sig Dispense Refill    traZODone (DESYREL) 100 MG tablet TAKE 2 TABLETS BY MOUTH EVERY NIGHT AT BEDTIME 180 tablet 0    sildenafil (VIAGRA) 50 MG tablet Take 1 tablet by mouth daily as needed for Erectile Dysfunction 30 tablet 3    atorvastatin (LIPITOR) 20 MG tablet Take 1 tablet by mouth daily 90 tablet 1    carvedilol (COREG) 12.5 MG tablet Take 1 tablet by mouth 2 times daily 180 tablet 1    lisinopril (PRINIVIL;ZESTRIL) 40 MG tablet Take 1 tablet by mouth daily 90 tablet 1    amLODIPine (NORVASC) 10 MG tablet Take 1 tablet by mouth daily 90 tablet 1    pantoprazole (PROTONIX) 40 MG tablet Take 1 tablet by mouth daily 90 tablet 1    allopurinol (ZYLOPRIM) 300 MG tablet TAKE ONE TABLET BY MOUTH ONCE A DAY 90 tablet 3    triamterene-hydroCHLOROthiazide (MAXZIDE-25) 37.5-25 MG per tablet Take 1 tablet by mouth daily 90 tablet 1

## 2025-02-03 DIAGNOSIS — F10.10 ALCOHOL ABUSE: Chronic | ICD-10-CM

## 2025-02-03 RX ORDER — FOLIC ACID 1 MG/1
1 TABLET ORAL DAILY
Qty: 90 TABLET | Refills: 3 | Status: SHIPPED | OUTPATIENT
Start: 2025-02-03

## 2025-02-17 ENCOUNTER — OFFICE VISIT (OUTPATIENT)
Dept: FAMILY MEDICINE CLINIC | Age: 60
End: 2025-02-17
Payer: COMMERCIAL

## 2025-02-17 VITALS
RESPIRATION RATE: 16 BRPM | HEART RATE: 64 BPM | BODY MASS INDEX: 35.31 KG/M2 | DIASTOLIC BLOOD PRESSURE: 76 MMHG | HEIGHT: 68 IN | TEMPERATURE: 98.4 F | WEIGHT: 233 LBS | SYSTOLIC BLOOD PRESSURE: 122 MMHG | OXYGEN SATURATION: 99 %

## 2025-02-17 DIAGNOSIS — R10.9 RIGHT FLANK PAIN: Primary | ICD-10-CM

## 2025-02-17 DIAGNOSIS — K59.00 CONSTIPATION, UNSPECIFIED CONSTIPATION TYPE: ICD-10-CM

## 2025-02-17 DIAGNOSIS — R14.0 ABDOMINAL BLOATING: ICD-10-CM

## 2025-02-17 DIAGNOSIS — R10.84 GENERALIZED ABDOMINAL PAIN: ICD-10-CM

## 2025-02-17 PROCEDURE — 99213 OFFICE O/P EST LOW 20 MIN: CPT | Performed by: NURSE PRACTITIONER

## 2025-02-17 PROCEDURE — 3078F DIAST BP <80 MM HG: CPT | Performed by: NURSE PRACTITIONER

## 2025-02-17 PROCEDURE — 3074F SYST BP LT 130 MM HG: CPT | Performed by: NURSE PRACTITIONER

## 2025-02-17 ASSESSMENT — ENCOUNTER SYMPTOMS
SHORTNESS OF BREATH: 0
SORE THROAT: 0
NAUSEA: 0
VOMITING: 0
BACK PAIN: 0
RHINORRHEA: 0
CONSTIPATION: 1
ABDOMINAL DISTENTION: 1
COUGH: 0
CHEST TIGHTNESS: 0
ABDOMINAL PAIN: 1
DIARRHEA: 0

## 2025-02-17 NOTE — PROGRESS NOTES
Merary Reyes, APRN-CNP  [facility]  [facility] MEDICINE  [address], [address]  [place] [place] [address]  Dept: [phone]  Dept Fax: [phone]     PATIENT ID: Lakhwinder James is a 60 y.o. male.    HPI:  Established pt here today for an acute visit secondary to right sided abdominal pain with radiation into his right mid back. He relates that the pain starts in the front of his abdomen than radiates to his right flank. 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  He denies pain currently. He did see me a couple of weeks for similar complaints. Urinalysis in office was completed and negative. He also had a KUB which did show a moderate stool burden. He was instructed to take Miralax BID x 3 days but stopped taking it because he had diarrhea. Instead, he has been taking Dulcolax, which again, is causing diarrhea. He relates that the pain does worsen after eating.  Pt denies any fever or chills.  Pt today denies any HA, chest pain, or SOB.  Pt denies any N/V/D/C or abdominal pain.  Otherwise pt doing well on current tx and voices no other concerns.      My previous office notes, labs and diagnostic studies were reviewed prior to and during encounter.  The patient's past medical, surgical, social, and family history as well as current medications and allergies were reviewed as documented in today's encounter by MARICRUZ Hernandez.     Current Outpatient Medications on File Prior to Visit   Medication Sig Dispense Refill    folic acid (FOLVITE) 1 MG tablet Take 1 tablet by mouth daily 90 tablet 3    triamcinolone (KENALOG) 0.1 % cream Apply topically 2 times daily Apply topically 2 times daily. 80 g 0    traZODone (DESYREL) 100 MG tablet TAKE 2 TABLETS BY MOUTH EVERY NIGHT AT BEDTIME 180 tablet 0    sildenafil (VIAGRA) 50 MG tablet Take 1 tablet by mouth daily as needed for Erectile Dysfunction 30 tablet 3    atorvastatin (LIPITOR) 20 MG tablet Take 1

## 2025-02-25 DIAGNOSIS — I10 PRIMARY HYPERTENSION: ICD-10-CM

## 2025-02-25 RX ORDER — TRIAMTERENE AND HYDROCHLOROTHIAZIDE 37.5; 25 MG/1; MG/1
1 TABLET ORAL DAILY
Qty: 90 TABLET | Refills: 1 | Status: SHIPPED | OUTPATIENT
Start: 2025-02-25

## 2025-04-01 ASSESSMENT — ENCOUNTER SYMPTOMS
ABDOMINAL PAIN: 0
RHINORRHEA: 0
SHORTNESS OF BREATH: 0
SORE THROAT: 0
COUGH: 0
CONSTIPATION: 0
DIARRHEA: 0
ABDOMINAL DISTENTION: 0
VOMITING: 0
NAUSEA: 0
CHEST TIGHTNESS: 0

## 2025-04-01 NOTE — PROGRESS NOTES
Merary Reyes, APRN-CNP  [facility]  [facility] MEDICINE  [address], [address]  [facility] [address]  Dept: [phone]  Dept Fax: [phone]     PATIENT ID: Lakhwinder James is a 60 y.o. male.    HPI:  Established pt here today for f/u on chronic medical problems; HTN, HLD, gout, GERD, vitamin d def, go over labs and/or diagnostic studies, and medication refills.   Pt today denies any HA, chest pain, or SOB.  Pt denies any N/V/D/C or abdominal pain.  Today, patient denies complaints.  Patient is taking medications as prescribed and is tolerating them well without significant side effects. Patient is doing well on current treatment. He relates that he has been taking Miralax and fiber and his abdominal pain has improved. He has not followed up with GI. He did establish care with Dr. Russo for renal artery stenosis. Per his note, he does not feel it is significant. He will be having another renal artery duplex this month. His BP is well controlled with his medications.     My previous office notes, labs and diagnostic studies were reviewed prior to and during encounter.  The patient's past medical, surgical, social, and family history as well as current medications and allergies were reviewed as documented in today's encounter by MARICRUZ Hernandez.     Current Outpatient Medications on File Prior to Visit   Medication Sig Dispense Refill    triamterene-hydroCHLOROthiazide (MAXZIDE-25) 37.5-25 MG per tablet Take 1 tablet by mouth daily 90 tablet 1    folic acid (FOLVITE) 1 MG tablet Take 1 tablet by mouth daily 90 tablet 3    triamcinolone (KENALOG) 0.1 % cream Apply topically 2 times daily Apply topically 2 times daily. 80 g 0    traZODone (DESYREL) 100 MG tablet TAKE 2 TABLETS BY MOUTH EVERY NIGHT AT BEDTIME 180 tablet 0    sildenafil (VIAGRA) 50 MG tablet Take 1 tablet by mouth daily as needed for Erectile Dysfunction 30 tablet 3    atorvastatin (LIPITOR) 20 MG

## 2025-04-02 ENCOUNTER — OFFICE VISIT (OUTPATIENT)
Dept: FAMILY MEDICINE CLINIC | Age: 60
End: 2025-04-02
Payer: COMMERCIAL

## 2025-04-02 VITALS
BODY MASS INDEX: 34.86 KG/M2 | HEART RATE: 50 BPM | DIASTOLIC BLOOD PRESSURE: 68 MMHG | TEMPERATURE: 97.6 F | OXYGEN SATURATION: 99 % | WEIGHT: 230 LBS | HEIGHT: 68 IN | RESPIRATION RATE: 16 BRPM | SYSTOLIC BLOOD PRESSURE: 116 MMHG

## 2025-04-02 DIAGNOSIS — E78.2 MIXED HYPERLIPIDEMIA: ICD-10-CM

## 2025-04-02 DIAGNOSIS — M1A.9XX0 CHRONIC GOUT WITHOUT TOPHUS, UNSPECIFIED CAUSE, UNSPECIFIED SITE: ICD-10-CM

## 2025-04-02 DIAGNOSIS — R10.84 GENERALIZED ABDOMINAL PAIN: ICD-10-CM

## 2025-04-02 DIAGNOSIS — Z79.899 ON STATIN THERAPY: ICD-10-CM

## 2025-04-02 DIAGNOSIS — R10.9 RIGHT FLANK PAIN: ICD-10-CM

## 2025-04-02 DIAGNOSIS — I70.1: ICD-10-CM

## 2025-04-02 DIAGNOSIS — E55.9 VITAMIN D DEFICIENCY: ICD-10-CM

## 2025-04-02 DIAGNOSIS — R73.9 HYPERGLYCEMIA: ICD-10-CM

## 2025-04-02 DIAGNOSIS — K21.9 GASTROESOPHAGEAL REFLUX DISEASE WITHOUT ESOPHAGITIS: ICD-10-CM

## 2025-04-02 DIAGNOSIS — I10 ESSENTIAL HYPERTENSION: Primary | ICD-10-CM

## 2025-04-02 DIAGNOSIS — F51.01 PRIMARY INSOMNIA: ICD-10-CM

## 2025-04-02 PROCEDURE — 3078F DIAST BP <80 MM HG: CPT | Performed by: NURSE PRACTITIONER

## 2025-04-02 PROCEDURE — 3074F SYST BP LT 130 MM HG: CPT | Performed by: NURSE PRACTITIONER

## 2025-04-02 PROCEDURE — 99214 OFFICE O/P EST MOD 30 MIN: CPT | Performed by: NURSE PRACTITIONER

## 2025-04-02 RX ORDER — TRAZODONE HYDROCHLORIDE 100 MG/1
TABLET ORAL
Qty: 180 TABLET | Refills: 0 | Status: SHIPPED | OUTPATIENT
Start: 2025-04-02

## 2025-04-02 ASSESSMENT — ENCOUNTER SYMPTOMS: BACK PAIN: 0

## 2025-04-03 DIAGNOSIS — I70.1 RENAL ARTERY STENOSIS: ICD-10-CM

## 2025-04-18 ENCOUNTER — HOSPITAL ENCOUNTER (OUTPATIENT)
Dept: VASCULAR LAB | Age: 60
Discharge: HOME | End: 2025-04-20
Attending: SURGERY
Payer: COMMERCIAL

## 2025-04-18 PROCEDURE — 93975 VASCULAR STUDY: CPT

## 2025-04-19 LAB
VAS AORTA MID PSV: 105.8 CM/S
VAS AORTA PROX PSV: 105.8 CM/S
VAS L RENAL ORIG RI: 0.8
VAS LEFT INFERIOR ARCUATE RI: 0.63
VAS LEFT KIDNEY ARCUATE ARTERY INFERIOR EDV: 14.8 CM/S
VAS LEFT KIDNEY ARCUATE ARTERY INFERIOR PSV: 39.5 CM/S
VAS LEFT KIDNEY ARCUATE ARTERY MEDIAL EDV: 11.1 CM/S
VAS LEFT KIDNEY ARCUATE ARTERY MEDIAL PSV: 24.8 CM/S
VAS LEFT KIDNEY ARCUATE ARTERY SUPERIOR EDV: 13 CM/S
VAS LEFT KIDNEY ARCUATE ARTERY SUPERIOR PSV: 36.7 CM/S
VAS LEFT KIDNEY LENGTH: 13.29 CM
VAS LEFT MEDIAL ARCUATE RI: 0.55
VAS LEFT RENAL DIST EDV: 32 CM/S
VAS LEFT RENAL DIST PSV: 151.7 CM/S
VAS LEFT RENAL DIST RAR: 1.43
VAS LEFT RENAL DIST RI: 0.79
VAS LEFT RENAL MID EDV: 41.7 CM/S
VAS LEFT RENAL MID PSV: 222.8 CM/S
VAS LEFT RENAL MID RAR: 2.11
VAS LEFT RENAL MID RI: 0.81
VAS LEFT RENAL ORIGIN EDV: 26.1 CM/S
VAS LEFT RENAL ORIGIN PSV: 131.5 CM/S
VAS LEFT RENAL ORIGIN RAR: 1.24
VAS LEFT RENAL PROX EDV: 21.7 CM/S
VAS LEFT RENAL PROX PSV: 118.3 CM/S
VAS LEFT RENAL PROX RAR: 1.12
VAS LEFT RENAL PROX RI: 0.82
VAS LEFT RENAL RAR: 2.11
VAS LEFT SUPERIOR ARCUATE RI: 0.65
VAS RIGHT INFERIOR ARCUATE RI: 0.53
VAS RIGHT KIDNEY ARCUATE ARTERY INFERIOR EDV: 6.7 CM/S
VAS RIGHT KIDNEY ARCUATE ARTERY INFERIOR PSV: 14.4 CM/S
VAS RIGHT KIDNEY ARCUATE ARTERY MEDIAL EDV: 7.8 CM/S
VAS RIGHT KIDNEY ARCUATE ARTERY MEDIAL PSV: 22.1 CM/S
VAS RIGHT KIDNEY ARCUATE ARTERY SUPERIOR EDV: 10 CM/S
VAS RIGHT KIDNEY ARCUATE ARTERY SUPERIOR PSV: 25.9 CM/S
VAS RIGHT KIDNEY LENGTH: 12.15 CM
VAS RIGHT MEDIAL ARCUATE RI: 0.65
VAS RIGHT RENAL DIST EDV: 62.4 CM/S
VAS RIGHT RENAL DIST PSV: 293.9 CM/S
VAS RIGHT RENAL DIST RAR: 2.78
VAS RIGHT RENAL DIST RI: 0.79
VAS RIGHT RENAL MID EDV: 58.5 CM/S
VAS RIGHT RENAL MID PSV: 270.4 CM/S
VAS RIGHT RENAL MID RAR: 2.56
VAS RIGHT RENAL MID RI: 0.78
VAS RIGHT RENAL ORIGIN EDV: 49 CM/S
VAS RIGHT RENAL ORIGIN PSV: 191.5 CM/S
VAS RIGHT RENAL ORIGIN RAR: 1.81
VAS RIGHT RENAL ORIGIN RI: 0.74
VAS RIGHT RENAL PROX EDV: 45 CM/S
VAS RIGHT RENAL PROX PSV: 229.3 CM/S
VAS RIGHT RENAL PROX RAR: 2.17
VAS RIGHT RENAL PROX RI: 0.8
VAS RIGHT RENAL RAR: 2.78
VAS RIGHT SUPERIOR ARCUATE RI: 0.61

## 2025-04-19 PROCEDURE — 93975 VASCULAR STUDY: CPT | Performed by: STUDENT IN AN ORGANIZED HEALTH CARE EDUCATION/TRAINING PROGRAM

## 2025-05-05 DIAGNOSIS — I10 PRIMARY HYPERTENSION: ICD-10-CM

## 2025-05-05 RX ORDER — LISINOPRIL 40 MG/1
40 TABLET ORAL DAILY
Qty: 90 TABLET | Refills: 1 | Status: SHIPPED | OUTPATIENT
Start: 2025-05-05

## 2025-05-05 RX ORDER — PANTOPRAZOLE SODIUM 40 MG/1
40 TABLET, DELAYED RELEASE ORAL DAILY
Qty: 90 TABLET | Refills: 1 | Status: SHIPPED | OUTPATIENT
Start: 2025-05-05

## 2025-05-05 RX ORDER — AMLODIPINE BESYLATE 10 MG/1
10 TABLET ORAL DAILY
Qty: 90 TABLET | Refills: 1 | Status: SHIPPED | OUTPATIENT
Start: 2025-05-05

## 2025-05-09 ENCOUNTER — OFFICE VISIT (OUTPATIENT)
Dept: VASCULAR SURGERY | Age: 60
End: 2025-05-09
Payer: COMMERCIAL

## 2025-05-09 VITALS
HEART RATE: 46 BPM | BODY MASS INDEX: 34.92 KG/M2 | DIASTOLIC BLOOD PRESSURE: 71 MMHG | TEMPERATURE: 98.4 F | SYSTOLIC BLOOD PRESSURE: 131 MMHG | OXYGEN SATURATION: 96 % | HEIGHT: 68 IN | WEIGHT: 230.4 LBS

## 2025-05-09 DIAGNOSIS — I70.1 RENAL ARTERY STENOSIS: Primary | ICD-10-CM

## 2025-05-09 PROCEDURE — 99213 OFFICE O/P EST LOW 20 MIN: CPT | Performed by: SURGERY

## 2025-05-09 PROCEDURE — 3078F DIAST BP <80 MM HG: CPT | Performed by: SURGERY

## 2025-05-09 PROCEDURE — 3075F SYST BP GE 130 - 139MM HG: CPT | Performed by: SURGERY

## 2025-05-09 NOTE — PROGRESS NOTES
[facility]  [address]  [place] [address]  Dept: [phone]     Patient: Lakhwinder James  : 1965  MRN: [id number]  DOS: 2025            HPI:  Lakhwinder James is a 60 y.o. male who returns to the office regarding his renal artery stenosis.  I think he has mild to moderate renal artery stenosis.  His latest duplex reveals less than 60% stenosis by velocity criteria and renal to aortic ratio criteria.  His kidneys still demonstrate excellent sizes at 12 to 13 cm in length.  His blood pressure is well-controlled on 4 medications which I do not think are maximized in terms of dosages.  He is on Maxide, lisinopril, carvedilol and amlodipine.    Review of Systems    Vitals:    25 1037   BP: 131/71   BP Site: Right Upper Arm   Patient Position: Sitting   BP Cuff Size: Medium Adult   Pulse: (!) 46   Temp: 98.4 °F (36.9 °C)   TempSrc: Temporal   SpO2: 96%   Weight: 104.5 kg (230 lb 6.4 oz)   Height: 1.727 m (5' 8\")          Physical Exam  His exam is unchanged.  His feet are warm and well-perfused.  He has no significant edema.  His abdomen is soft nontender nondistended.    Assessment:  1. Renal artery stenosis          Plan:  At this time we can have him follow-up at a yearly interval with renal artery duplex.  I imagine that his hypertension is not necessarily from renal artery stenosis however it should be followed on a yearly basis.    Electronically signed by:  cR Russo MD

## 2025-06-03 DIAGNOSIS — I10 PRIMARY HYPERTENSION: ICD-10-CM

## 2025-06-04 RX ORDER — CARVEDILOL 12.5 MG/1
12.5 TABLET ORAL 2 TIMES DAILY
Qty: 180 TABLET | Refills: 1 | Status: SHIPPED | OUTPATIENT
Start: 2025-06-04

## 2025-06-04 RX ORDER — ATORVASTATIN CALCIUM 20 MG/1
20 TABLET, FILM COATED ORAL DAILY
Qty: 90 TABLET | Refills: 1 | Status: SHIPPED | OUTPATIENT
Start: 2025-06-04

## 2025-07-14 RX ORDER — TRAZODONE HYDROCHLORIDE 100 MG/1
TABLET ORAL
Qty: 180 TABLET | Refills: 3 | Status: SHIPPED | OUTPATIENT
Start: 2025-07-14

## 2025-08-15 ENCOUNTER — TELEPHONE (OUTPATIENT)
Dept: FAMILY MEDICINE CLINIC | Age: 60
End: 2025-08-15

## 2025-08-15 DIAGNOSIS — R10.9 RIGHT FLANK PAIN: ICD-10-CM

## 2025-08-15 DIAGNOSIS — R63.4 WEIGHT LOSS: ICD-10-CM

## 2025-08-15 DIAGNOSIS — Z12.5 PROSTATE CANCER SCREENING: Primary | ICD-10-CM

## (undated) DEVICE — GLOVE ORANGE PI 7 1/2   MSG9075

## (undated) DEVICE — SHEET, T, LAPAROTOMY, STERILE: Brand: MEDLINE

## (undated) DEVICE — SUTURE NONABSORBABLE MONOFILAMENT 3-0 PS-1 18 IN BLK ETHILON 1663H

## (undated) DEVICE — DRAPE,U/ SHT,SPLIT,PLAS,STERIL: Brand: MEDLINE

## (undated) DEVICE — GOWN,SIRUS,NONRNF,SETINSLV,XL,20/CS: Brand: MEDLINE

## (undated) DEVICE — GLOVE ORANGE PI 8   MSG9080

## (undated) DEVICE — GLOVE SURG SZ 6 THK91MIL LTX FREE SYN POLYISOPRENE ANTI

## (undated) DEVICE — SYRINGE BLB 2 PC STER 50

## (undated) DEVICE — GLOVE SURG SZ 65 L12IN THK91MIL BRN LTX FREE

## (undated) DEVICE — STERLING XTRASHARP SHAVER GREAT WHITE SHAVER BLADE, 4.2 MM: Brand: STERLING XTRASHARP SHAVER GREAT WHITE

## (undated) DEVICE — APPLICATOR MEDICATED 26 CC SOLUTION HI LT ORNG CHLORAPREP

## (undated) DEVICE — BLADE CLIPPER GEN PURP NS

## (undated) DEVICE — MAT FLOOR ULTRA ABS 28X48IN

## (undated) DEVICE — GOWN,AURORA,NONREINFORCED,LARGE: Brand: MEDLINE

## (undated) DEVICE — PADDING,UNDERCAST,COTTON, 4"X4YD STERILE: Brand: MEDLINE

## (undated) DEVICE — MINOR BSIN PK

## (undated) DEVICE — YANKAUER,FLEXIBLE HANDLE,REGLR CAPACITY: Brand: MEDLINE INDUSTRIES, INC.

## (undated) DEVICE — Device

## (undated) DEVICE — Z DISCONTINUED BY MEDLINE USE 2271199 TRAY PREP WET 4% CHG SCRB SOL

## (undated) DEVICE — PADDING CAST W6INXL4YD COT LO LINTING WYTEX

## (undated) DEVICE — SOLUTION IV 500ML 0.9% SOD CHL PH 5 INJ USP VIAFLX PLAS